# Patient Record
Sex: MALE | Race: WHITE | NOT HISPANIC OR LATINO | Employment: OTHER | URBAN - METROPOLITAN AREA
[De-identification: names, ages, dates, MRNs, and addresses within clinical notes are randomized per-mention and may not be internally consistent; named-entity substitution may affect disease eponyms.]

---

## 2023-04-18 ENCOUNTER — OFFICE VISIT (OUTPATIENT)
Dept: PRIMARY CARE | Facility: CLINIC | Age: 73
End: 2023-04-18
Payer: MEDICARE

## 2023-04-18 VITALS
BODY MASS INDEX: 25.99 KG/M2 | SYSTOLIC BLOOD PRESSURE: 140 MMHG | OXYGEN SATURATION: 98 % | TEMPERATURE: 97.5 F | HEIGHT: 65 IN | WEIGHT: 156 LBS | DIASTOLIC BLOOD PRESSURE: 78 MMHG | HEART RATE: 71 BPM

## 2023-04-18 DIAGNOSIS — E78.00 PURE HYPERCHOLESTEROLEMIA: ICD-10-CM

## 2023-04-18 DIAGNOSIS — I44.7 LEFT BUNDLE BRANCH BLOCK (LBBB): ICD-10-CM

## 2023-04-18 DIAGNOSIS — R39.16 BENIGN PROSTATIC HYPERPLASIA (BPH) WITH STRAINING ON URINATION: ICD-10-CM

## 2023-04-18 DIAGNOSIS — I42.9 CARDIOMYOPATHY, UNSPECIFIED TYPE (MULTI): ICD-10-CM

## 2023-04-18 DIAGNOSIS — I10 BENIGN ESSENTIAL HYPERTENSION: ICD-10-CM

## 2023-04-18 DIAGNOSIS — E53.8 VITAMIN B 12 DEFICIENCY: ICD-10-CM

## 2023-04-18 DIAGNOSIS — N40.1 BENIGN PROSTATIC HYPERPLASIA (BPH) WITH STRAINING ON URINATION: ICD-10-CM

## 2023-04-18 DIAGNOSIS — I25.10 ATHEROSCLEROSIS OF NATIVE CORONARY ARTERY OF NATIVE HEART WITHOUT ANGINA PECTORIS: ICD-10-CM

## 2023-04-18 DIAGNOSIS — I36.1 NONRHEUMATIC TRICUSPID VALVE REGURGITATION: ICD-10-CM

## 2023-04-18 DIAGNOSIS — Z00.00 MEDICARE ANNUAL WELLNESS VISIT, SUBSEQUENT: Primary | ICD-10-CM

## 2023-04-18 DIAGNOSIS — E11.69 TYPE 2 DIABETES MELLITUS WITH OTHER SPECIFIED COMPLICATION, UNSPECIFIED WHETHER LONG TERM INSULIN USE (MULTI): ICD-10-CM

## 2023-04-18 PROBLEM — E78.5 HYPERLIPIDEMIA: Status: ACTIVE | Noted: 2023-04-18

## 2023-04-18 PROBLEM — N40.0 BPH (BENIGN PROSTATIC HYPERPLASIA): Status: ACTIVE | Noted: 2023-04-18

## 2023-04-18 PROBLEM — R97.20 ELEVATED PSA: Status: ACTIVE | Noted: 2023-04-18

## 2023-04-18 PROBLEM — E11.9 DIABETES MELLITUS, TYPE II (MULTI): Status: ACTIVE | Noted: 2023-04-18

## 2023-04-18 PROBLEM — M67.911 DISORDER OF RIGHT ROTATOR CUFF: Status: ACTIVE | Noted: 2023-04-18

## 2023-04-18 PROBLEM — I50.1 LEFT VENTRICULAR FAILURE (MULTI): Status: ACTIVE | Noted: 2023-04-18

## 2023-04-18 LAB — POC HEMOGLOBIN A1C: 6.6 % (ref 4.2–6.5)

## 2023-04-18 PROCEDURE — 1160F RVW MEDS BY RX/DR IN RCRD: CPT | Performed by: FAMILY MEDICINE

## 2023-04-18 PROCEDURE — 3078F DIAST BP <80 MM HG: CPT | Performed by: FAMILY MEDICINE

## 2023-04-18 PROCEDURE — 4010F ACE/ARB THERAPY RXD/TAKEN: CPT | Performed by: FAMILY MEDICINE

## 2023-04-18 PROCEDURE — 3077F SYST BP >= 140 MM HG: CPT | Performed by: FAMILY MEDICINE

## 2023-04-18 PROCEDURE — 83036 HEMOGLOBIN GLYCOSYLATED A1C: CPT | Performed by: FAMILY MEDICINE

## 2023-04-18 PROCEDURE — 1170F FXNL STATUS ASSESSED: CPT | Performed by: FAMILY MEDICINE

## 2023-04-18 PROCEDURE — 99214 OFFICE O/P EST MOD 30 MIN: CPT | Performed by: FAMILY MEDICINE

## 2023-04-18 PROCEDURE — G0439 PPPS, SUBSEQ VISIT: HCPCS | Performed by: FAMILY MEDICINE

## 2023-04-18 PROCEDURE — 1036F TOBACCO NON-USER: CPT | Performed by: FAMILY MEDICINE

## 2023-04-18 PROCEDURE — 1159F MED LIST DOCD IN RCRD: CPT | Performed by: FAMILY MEDICINE

## 2023-04-18 RX ORDER — MAGNESIUM 200 MG
TABLET ORAL
COMMUNITY

## 2023-04-18 RX ORDER — LANOLIN ALCOHOL/MO/W.PET/CERES
1 CREAM (GRAM) TOPICAL DAILY
COMMUNITY
End: 2023-10-18 | Stop reason: ALTCHOICE

## 2023-04-18 RX ORDER — LOSARTAN POTASSIUM AND HYDROCHLOROTHIAZIDE 12.5; 1 MG/1; MG/1
TABLET ORAL
COMMUNITY
Start: 2023-04-15 | End: 2023-04-18 | Stop reason: ALTCHOICE

## 2023-04-18 RX ORDER — PIOGLITAZONEHYDROCHLORIDE 15 MG/1
1 TABLET ORAL DAILY
COMMUNITY
End: 2023-04-18 | Stop reason: ALTCHOICE

## 2023-04-18 RX ORDER — EMPAGLIFLOZIN 25 MG/1
1 TABLET, FILM COATED ORAL DAILY
COMMUNITY
Start: 2021-02-16 | End: 2023-04-18 | Stop reason: SDUPTHER

## 2023-04-18 RX ORDER — EMPAGLIFLOZIN 25 MG/1
25 TABLET, FILM COATED ORAL DAILY
Qty: 90 TABLET | Refills: 3 | Status: SHIPPED | OUTPATIENT
Start: 2023-04-18

## 2023-04-18 RX ORDER — ATORVASTATIN CALCIUM 40 MG/1
40 TABLET, FILM COATED ORAL NIGHTLY
COMMUNITY
Start: 2023-02-01 | End: 2023-04-18 | Stop reason: ALTCHOICE

## 2023-04-18 RX ORDER — FLUTICASONE PROPIONATE AND SALMETEROL 250; 50 UG/1; UG/1
POWDER RESPIRATORY (INHALATION)
COMMUNITY
Start: 2022-02-24 | End: 2023-04-18 | Stop reason: ALTCHOICE

## 2023-04-18 RX ORDER — MINERAL OIL
1 ENEMA (ML) RECTAL DAILY
COMMUNITY

## 2023-04-18 RX ORDER — LOSARTAN POTASSIUM 100 MG/1
1 TABLET ORAL DAILY
COMMUNITY
Start: 2015-11-10 | End: 2024-04-18 | Stop reason: WASHOUT

## 2023-04-18 RX ORDER — CLOPIDOGREL BISULFATE 75 MG/1
75 TABLET ORAL DAILY
COMMUNITY
End: 2023-06-20 | Stop reason: SDUPTHER

## 2023-04-18 RX ORDER — AMLODIPINE BESYLATE 5 MG/1
1 TABLET ORAL DAILY
COMMUNITY
End: 2023-10-18 | Stop reason: ALTCHOICE

## 2023-04-18 RX ORDER — ATORVASTATIN CALCIUM 10 MG/1
10 TABLET, FILM COATED ORAL DAILY
Qty: 30 TABLET | Refills: 5 | Status: SHIPPED | OUTPATIENT
Start: 2023-04-18 | End: 2024-02-19 | Stop reason: WASHOUT

## 2023-04-18 RX ORDER — CARVEDILOL 12.5 MG/1
TABLET ORAL 2 TIMES DAILY
COMMUNITY
End: 2024-04-02

## 2023-04-18 RX ORDER — KETOCONAZOLE 20 MG/ML
SHAMPOO, SUSPENSION TOPICAL
COMMUNITY
Start: 2021-11-16 | End: 2023-04-18 | Stop reason: ALTCHOICE

## 2023-04-18 ASSESSMENT — ACTIVITIES OF DAILY LIVING (ADL)
DOING_HOUSEWORK: INDEPENDENT
GROCERY_SHOPPING: INDEPENDENT
TAKING_MEDICATION: INDEPENDENT
DRESSING: INDEPENDENT
MANAGING_FINANCES: INDEPENDENT
BATHING: INDEPENDENT

## 2023-04-18 ASSESSMENT — PATIENT HEALTH QUESTIONNAIRE - PHQ9
1. LITTLE INTEREST OR PLEASURE IN DOING THINGS: NOT AT ALL
SUM OF ALL RESPONSES TO PHQ9 QUESTIONS 1 AND 2: 0
2. FEELING DOWN, DEPRESSED OR HOPELESS: NOT AT ALL

## 2023-04-18 NOTE — PROGRESS NOTES
"Subjective   Reason for Visit: Tre Sumner is an 72 y.o. male here for a Medicare Wellness visit.     Past Medical, Surgical, and Family History reviewed and updated in chart.         HPI  Reviewed chronic medical conditions  Patient Care Team:  David Ward DO as PCP - General  David Ward DO as PCP - [employer] PCP     Review of Systems  No other complaints  Objective   Vitals:  /78   Pulse 71   Temp 36.4 °C (97.5 °F)   Ht 1.651 m (5' 5\")   Wt 70.8 kg (156 lb)   SpO2 98%   BMI 25.96 kg/m²       Physical Exam  General: Patient is alert and oriented ×3 and appears in no acute distress. No respiratory distress.    Head: Atraumatic normocephalic.    Eyes: EOMI, PERRLA      Ears: Canals patent without any irritation, tympanic membranes without inflammation, no swelling, normal light reflex.  Hearing loss present    Nose: Nares patent. Turbinates are not swollen. No discharge.    Mouth: Normal mucosa. Moist. No erythema, exudates, tonsillar enlargement.    Neck: Normal range of motion, no masses.  Thyroid is palpable and normal in size without any nodules. No anterior cervical or posterior cervical adenopathy.    Heart: Regular rate and rhythm, no murmurs clicks or gallops    Lungs: Clear to auscultation bilaterally without any rhonchi rales or wheezing, lung sounds heard throughout all lung fields    Abdomen: Soft, nontender, no rigidity, rebound, guarding or organomegaly. Bowel sounds ×4 quadrants.    Musculoskeletal: Normal range of motion, strength is grossly intact in the proximal distal muscles of the upper and lower extremities bilaterally, deep tendon reflexes +2 out of 4 and symmetric bilaterally at the patella, Achilles, biceps, triceps, sensation intact.    Nerves: Cranial nerves II through XII appear grossly intact and without deficit    Skin: Intact, dry, no rashes or erythema    Psych: Normal affect.   Assessment/Plan   Problem List Items Addressed This Visit       " Atherosclerotic heart disease of native coronary artery without angina pectoris    Relevant Medications    amLODIPine (Norvasc) 5 mg tablet    clopidogrel (Plavix) 75 mg tablet    carvedilol (Coreg) 12.5 mg tablet    atorvastatin (Lipitor) 10 mg tablet    Other Relevant Orders    CBC and Auto Differential    Comprehensive Metabolic Panel    Vitamin D 1,25 Dihydroxy    Benign essential hypertension    Relevant Medications    atorvastatin (Lipitor) 10 mg tablet    Other Relevant Orders    CBC and Auto Differential    Comprehensive Metabolic Panel    Vitamin D 1,25 Dihydroxy    BPH (benign prostatic hyperplasia)    Relevant Medications    atorvastatin (Lipitor) 10 mg tablet    Other Relevant Orders    CBC and Auto Differential    Comprehensive Metabolic Panel    Vitamin D 1,25 Dihydroxy    PSA    Cardiomyopathy (CMS/HCC)    Relevant Medications    amLODIPine (Norvasc) 5 mg tablet    clopidogrel (Plavix) 75 mg tablet    carvedilol (Coreg) 12.5 mg tablet    Diabetes mellitus, type II (CMS/HCC)    Relevant Medications    Jardiance 25 mg    Other Relevant Orders    POCT glycosylated hemoglobin (Hb A1C) manually resulted (Completed)    Hyperlipidemia    Left bundle branch block (LBBB)    Nonrheumatic tricuspid valve regurgitation    Relevant Medications    amLODIPine (Norvasc) 5 mg tablet    clopidogrel (Plavix) 75 mg tablet    carvedilol (Coreg) 12.5 mg tablet    Vitamin B 12 deficiency     Other Visit Diagnoses       Medicare annual wellness visit, subsequent    -  Primary        Diabetes mellitus: Controlled  - continue Jardiance 25 mg 1/2 tab daily, Hemoglobin A1c 6.6 in office April 18, 2023  - Microalbumin was normal April 18, 2023  1  - Eye exam March 2023  -On a statin and on angiotensin receptor blocker  - Denies any cut scrapes or sores on the feet or any burning numbness tingling in the hands of the feet      Hypertension- Uncontrolled  -Continue medication losartan HCTZ 100/12.5 mg daily     Hyperlipidemia  -  Atorvastatin currently taken and tolerated  -Continue CoQ10 200 mg     Cholesterol 160 mg/dL  LDL 85 mg/dL    HDL 43.0 mg/dL  VLDL 32 mg/dL   Cholesterol/HDL Ratio 3.7  Triglycerides 159 High  mg/dL      We have suggested MIND diet     Follow up in 6 months or as needed

## 2023-06-20 DIAGNOSIS — I25.10 ATHEROSCLEROSIS OF NATIVE CORONARY ARTERY OF NATIVE HEART WITHOUT ANGINA PECTORIS: Primary | ICD-10-CM

## 2023-06-20 RX ORDER — CLOPIDOGREL BISULFATE 75 MG/1
75 TABLET ORAL DAILY
Qty: 90 TABLET | Refills: 3 | Status: SHIPPED | OUTPATIENT
Start: 2023-06-20 | End: 2024-06-14

## 2023-10-18 ENCOUNTER — OFFICE VISIT (OUTPATIENT)
Dept: PRIMARY CARE | Facility: CLINIC | Age: 73
End: 2023-10-18
Payer: MEDICARE

## 2023-10-18 VITALS
HEART RATE: 64 BPM | OXYGEN SATURATION: 98 % | DIASTOLIC BLOOD PRESSURE: 72 MMHG | HEIGHT: 65 IN | BODY MASS INDEX: 26.16 KG/M2 | SYSTOLIC BLOOD PRESSURE: 130 MMHG | WEIGHT: 157 LBS | TEMPERATURE: 97.5 F

## 2023-10-18 DIAGNOSIS — N40.1 BENIGN PROSTATIC HYPERPLASIA (BPH) WITH STRAINING ON URINATION: ICD-10-CM

## 2023-10-18 DIAGNOSIS — I10 BENIGN ESSENTIAL HYPERTENSION: ICD-10-CM

## 2023-10-18 DIAGNOSIS — E78.00 PURE HYPERCHOLESTEROLEMIA: ICD-10-CM

## 2023-10-18 DIAGNOSIS — Z00.00 ANNUAL PHYSICAL EXAM: Primary | ICD-10-CM

## 2023-10-18 DIAGNOSIS — R39.16 BENIGN PROSTATIC HYPERPLASIA (BPH) WITH STRAINING ON URINATION: ICD-10-CM

## 2023-10-18 DIAGNOSIS — I25.10 ATHEROSCLEROSIS OF NATIVE CORONARY ARTERY OF NATIVE HEART WITHOUT ANGINA PECTORIS: ICD-10-CM

## 2023-10-18 DIAGNOSIS — E11.69 TYPE 2 DIABETES MELLITUS WITH OTHER SPECIFIED COMPLICATION, UNSPECIFIED WHETHER LONG TERM INSULIN USE (MULTI): ICD-10-CM

## 2023-10-18 DIAGNOSIS — E53.8 VITAMIN B 12 DEFICIENCY: ICD-10-CM

## 2023-10-18 DIAGNOSIS — I42.9 CARDIOMYOPATHY, UNSPECIFIED TYPE (MULTI): ICD-10-CM

## 2023-10-18 DIAGNOSIS — I44.7 LEFT BUNDLE BRANCH BLOCK (LBBB): ICD-10-CM

## 2023-10-18 PROBLEM — M25.542 PAIN IN THUMB JOINT WITH MOVEMENT OF LEFT HAND: Status: ACTIVE | Noted: 2023-10-18

## 2023-10-18 PROBLEM — I35.1 NONRHEUMATIC AORTIC VALVE INSUFFICIENCY: Status: ACTIVE | Noted: 2023-10-18

## 2023-10-18 PROBLEM — R06.2 WHEEZING: Status: ACTIVE | Noted: 2023-10-18

## 2023-10-18 PROBLEM — M25.511 RIGHT SHOULDER PAIN: Status: ACTIVE | Noted: 2023-10-18
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
## 2023-10-18 PROBLEM — M19.011 OSTEOARTHRITIS OF RIGHT GLENOHUMERAL JOINT: Status: ACTIVE | Noted: 2023-10-18

## 2023-10-18 PROBLEM — L21.0 SEBORRHEA CAPITIS: Status: ACTIVE | Noted: 2023-10-18

## 2023-10-18 PROBLEM — M77.8 TENDINITIS OF RIGHT SHOULDER: Status: ACTIVE | Noted: 2023-10-18

## 2023-10-18 PROBLEM — J40 BRONCHITIS: Status: ACTIVE | Noted: 2023-10-18

## 2023-10-18 PROBLEM — L72.3 INFECTED SEBACEOUS CYST: Status: ACTIVE | Noted: 2023-10-18

## 2023-10-18 PROBLEM — R05.9 COUGH: Status: ACTIVE | Noted: 2023-10-18

## 2023-10-18 PROBLEM — M54.31 SCIATICA OF RIGHT SIDE: Status: ACTIVE | Noted: 2023-10-18

## 2023-10-18 PROBLEM — M19.032 LOCALIZED PRIMARY OSTEOARTHRITIS OF CARPOMETACARPAL (CMC) JOINT OF LEFT WRIST: Status: ACTIVE | Noted: 2023-10-18

## 2023-10-18 PROBLEM — L03.119 CELLULITIS OF LOWER LEG: Status: ACTIVE | Noted: 2023-10-18

## 2023-10-18 PROBLEM — L08.9 INFECTED SEBACEOUS CYST: Status: ACTIVE | Noted: 2023-10-18

## 2023-10-18 PROBLEM — M75.41 IMPINGEMENT SYNDROME OF RIGHT SHOULDER: Status: ACTIVE | Noted: 2023-10-18

## 2023-10-18 PROBLEM — R07.81 RIB PAIN ON RIGHT SIDE: Status: ACTIVE | Noted: 2023-10-18

## 2023-10-18 PROBLEM — M75.51 BURSITIS OF RIGHT SHOULDER: Status: ACTIVE | Noted: 2023-10-18

## 2023-10-18 PROBLEM — J06.9 URI (UPPER RESPIRATORY INFECTION): Status: ACTIVE | Noted: 2023-10-18

## 2023-10-18 PROBLEM — M12.811 ROTATOR CUFF ARTHROPATHY OF RIGHT SHOULDER: Status: ACTIVE | Noted: 2023-10-18

## 2023-10-18 LAB — POC HEMOGLOBIN A1C: 6.4 % (ref 4.2–6.5)

## 2023-10-18 PROCEDURE — 1159F MED LIST DOCD IN RCRD: CPT | Performed by: FAMILY MEDICINE

## 2023-10-18 PROCEDURE — 1036F TOBACCO NON-USER: CPT | Performed by: FAMILY MEDICINE

## 2023-10-18 PROCEDURE — 99397 PER PM REEVAL EST PAT 65+ YR: CPT | Performed by: FAMILY MEDICINE

## 2023-10-18 PROCEDURE — 83036 HEMOGLOBIN GLYCOSYLATED A1C: CPT | Performed by: FAMILY MEDICINE

## 2023-10-18 PROCEDURE — 1160F RVW MEDS BY RX/DR IN RCRD: CPT | Performed by: FAMILY MEDICINE

## 2023-10-18 PROCEDURE — 99213 OFFICE O/P EST LOW 20 MIN: CPT | Performed by: FAMILY MEDICINE

## 2023-10-18 PROCEDURE — 3075F SYST BP GE 130 - 139MM HG: CPT | Performed by: FAMILY MEDICINE

## 2023-10-18 PROCEDURE — 4010F ACE/ARB THERAPY RXD/TAKEN: CPT | Performed by: FAMILY MEDICINE

## 2023-10-18 PROCEDURE — 3078F DIAST BP <80 MM HG: CPT | Performed by: FAMILY MEDICINE

## 2023-10-18 ASSESSMENT — PATIENT HEALTH QUESTIONNAIRE - PHQ9
1. LITTLE INTEREST OR PLEASURE IN DOING THINGS: NOT AT ALL
2. FEELING DOWN, DEPRESSED OR HOPELESS: NOT AT ALL
SUM OF ALL RESPONSES TO PHQ9 QUESTIONS 1 AND 2: 0

## 2023-10-18 NOTE — PROGRESS NOTES
"Subjective   Reason for Visit: Tre Sumner is an 72 y.o. male here for a annual physical     Past Medical, Surgical, and Family History reviewed and updated in chart.HBA1C    Reviewed all medications by prescribing practitioner or clinical pharmacist (such as prescriptions, OTCs, herbal therapies and supplements) and documented in the medical record.    HPI  Reviewed chronic medical conditions    Concerns today: none    In general the patient states that his health is:  good    Regular dental visits: Has dentures  Vision problems: has dry eyes and sees Dr Urena.  Eye exam 9/2023  Hearing loss:  Has hearing aides.      Diet: Eating less  Exercise:  He is physically active and goes to the gym  Weight concerns: no  Sleep: Sleep is good with benadryl  Caffeine: 2 cups coffee  Water: good intake    Tobacco use:no   Alcohol use:no  Illicit drug use:no    Colon cancer screening:  Last done  Colonoscopy   fecal occult blood test   Cologuard    Do you feel safe at home?:  Patient Care Team:  David Ward DO as PCP - General  David Ward DO as PCP - [employer] PCP     Review of Systems  No other complaints  Objective   Vitals:  /72   Pulse 64   Temp 36.4 °C (97.5 °F)   Ht 1.651 m (5' 5\")   Wt 71.2 kg (157 lb)   SpO2 98%   BMI 26.13 kg/m²       Physical Exam  General: Patient is alert and oriented ×3 and appears in no acute distress. No respiratory distress.    Head: Atraumatic normocephalic.    Eyes: EOMI, PERRLA      Ears: Canals patent without any irritation, tympanic membranes without inflammation, no swelling, normal light reflex.  Hearing loss present    Nose: Nares patent. Turbinates are not swollen. No discharge.    Mouth: Normal mucosa. Moist. No erythema, exudates, tonsillar enlargement.    Neck: Normal range of motion, no masses.  Thyroid is palpable and normal in size without any nodules. No anterior cervical or posterior cervical adenopathy.    Heart: Regular rate and rhythm, " no murmurs clicks or gallops    Lungs: Clear to auscultation bilaterally without any rhonchi rales or wheezing, lung sounds heard throughout all lung fields    Abdomen: Soft, nontender, no rigidity, rebound, guarding or organomegaly. Bowel sounds ×4 quadrants.    Musculoskeletal: Normal range of motion, strength is grossly intact in the proximal distal muscles of the upper and lower extremities bilaterally, deep tendon reflexes +2 out of 4 and symmetric bilaterally at the patella, Achilles, biceps, triceps, sensation intact.    Nerves: Cranial nerves II through XII appear grossly intact and without deficit    Skin: Intact, dry, no rashes or erythema    Psych: Normal affect.   Assessment/Plan   Problem List Items Addressed This Visit       Atherosclerotic heart disease of native coronary artery without angina pectoris    Benign essential hypertension    BPH (benign prostatic hyperplasia)    Cardiomyopathy (CMS/HCC)    Diabetes mellitus, type II (CMS/HCC)    Relevant Orders    Hemoglobin A1C    Vitamin B12    Hyperlipidemia    Left bundle branch block (LBBB)    Vitamin B 12 deficiency     Other Visit Diagnoses       Annual physical exam    -  Primary        Diabetes mellitus: Controlled  - continue Jardiance 25 mg 1/2 tab daily, Hemoglobin A1c 6.4 10/18/2023  - Microalbumin was normal April 18, 2023           -   Eye exam March 2023  -On a statin and on angiotensin receptor blocker  - Denies any cut scrapes or sores on the feet or any burning numbness tingling in the hands of the feet    Hypertension- Uncontrolled  -Continue medication losartan HCTZ 100/12.5 mg daily     Hyperlipidemia  - Atorvastatin 40 mg dailycurrently taken and tolerated  -Continue CoQ10 200 mg     Cholesterol 160 mg/dL  LDL 85 mg/dL    HDL 43.0 mg/dL  VLDL 32 mg/dL   Cholesterol/HDL Ratio 3.7  Triglycerides 159 High  mg/dL      We have suggested MIND diet     Follow up in 6 months or as needed

## 2023-12-03 PROBLEM — I50.20 HFREF (HEART FAILURE WITH REDUCED EJECTION FRACTION) (MULTI): Status: ACTIVE | Noted: 2023-12-03

## 2023-12-03 NOTE — PROGRESS NOTES
[facility]    Patient Name: Tre Sumner  Patient : 1950      Scribe Attestation  By signing my name below, I, Danilo Schaffer   attest that this documentation has been prepared under the direction and in the presence of Albaro Ovalles DO.       Reason for visit:  This is a 72-year-old male here for follow-up regarding his history of coronary artery disease status post PCI of his RCA done in , HFrEF with an ejection fraction of 40%, mild aortic valve regurgitation, hypertension, and dyslipidemia.      HPI:  This is a 72-year-old male here for follow-up regarding his history of coronary artery disease status post PCI of his RCA done in , HFrEF with an ejection fraction of 40%, mild aortic valve regurgitation, hypertension, and dyslipidemia.  The patient was last evaluated by me in 2023 at which time I ordered blood work including CMP/lipid/magnesium/CBC be drawn in 6 months and asked the patient to follow-up in 6 months and sooner if necessary.  CMP done in 2023 showed normal serum sodium and potassium, with a creatinine of 0.96. Normal ALT and AST. Serum magnesium 2.15. Hemoglobin A1c done in 2023 was 7.0%. CBC showed a hemoglobin of 15.8. Lipid panel done in 2023 showed an LDL of 67 and triglycerides of 110 on atorvastatin 40 mg daily. ECG done today showed sinus rhythm with a heart rate of 67 bpm. The patient reports intermittent mild chest heaviness which sometimes occur with exertion, like during exercise. He states that chest discomfort resolves after a few minutes without intervention. He denies any new shortness of breath palpitations and lightheadedness. He states that he takes all of his medications as prescribed. During my exam, he was resting comfortably on the exam table.            Assessment/Plan:   1. Coronary artery disease/chest heaviness  The patient has a history of coronary artery disease status  post PCI of his RCA done in 2010.  ECG done today showed sinus rhythm with a heart rate of 67 bpm.   He  reports intermittent chest heaviness as described in the HPI.  Blood pressure appears controlled on exam today.  He should continue his current antihypertensive medications and antiplatelet therapy.  Echocardiogram done in December 2022 showed mildly reduced left ventricular systolic function with an ejection fraction of 40 to 45%, normal right ventricular systolic function, mild aortic valve regurgitation, mild mitral valve regurgitation.  Lipid panel done in December 2023 showed an LDL of 67 and triglycerides of 110 on atorvastatin 40 mg daily.   Suggested further ischemic work up including stress test, as well as starting isosorbide mononitrate, which he declined at this time.   Recent lab works as noted in the HPI.   Lab works as ordered above will be done in 6 months prior to the next visit.  Please see lifestyle recommendations below.  Follow up in 6 months and sooner if necessary.      2. HFrEF  The patient has a history of HFrEF with an ejection fraction of 40%.  Echocardiogram done in December 2022 showed mildly reduced left ventricular systolic function with an ejection fraction of 40 to 45%, normal right ventricular systolic function, mild aortic valve regurgitation, mild mitral valve regurgitation.  He does not appear significantly volume overloaded on exam today.  He should continue current cardiac medications.  Recent lab works as noted in the HPI.   Lab works as noted below will be done in 6 months prior to his next visit.   I discussed with him the importance of following a low-sodium heart healthy diet.  Follow up in 6 months and sooner if necessary.      3. Aortic valve regurgitation  The patient has a history of aortic valve regurgitation.  Echocardiogram done in December 2022 showed mildly reduced left ventricular systolic function with an ejection fraction of 40 to 45%, normal right  ventricular systolic function, mild aortic valve regurgitation, mild mitral valve regurgitation.  I will continue to monitor clinically for now.     4. Hypertension  Patient has a history of hypertension which appears controlled on exam today.  He should continue his current antihypertensive medications.      5. Dyslipidemia  Lipid panel done in December 2023 showed an LDL of 67 and triglycerides of 110 on atorvastatin 40 mg daily.   Please see lifestyle recommendations below.       Orders:   Suggested stress test -patient declined.  Suggested Imdur -patient declined.  BMP/BNP/magnesium in 6 months,   Follow-up in 6 months.      Lifestyle Recommendations  I recommend a whole-food plant-based diet, an eating pattern that encourages the consumption of unrefined plant foods (such as fruits, vegetables, tubers, whole grains, legumes, nuts and seeds) and discourages meats, dairy products, eggs and processed foods.     The AHA/ACC recommends that the patient consume a dietary pattern that emphasizes intake of vegetables, fruits, and whole grains; includes low-fat dairy products, poultry, fish, legumes, non-tropical vegetable oils, and nuts; and limits intake of sodium, sweets, sugar-sweetened beverages, and red meats.  Adapt this dietary pattern to appropriate calorie requirements (a 500-750 kcal/day deficit to loose weight), personal and cultural food preferences, and nutrition therapy for other medical conditions (including diabetes).  Achieve this pattern by following plans such as the [place] Mediterranean, DASH dietary pattern, or AHA diet.     Engage in 2 hours and 30 minutes per week of moderate-intensity physical activity, or 1 hour and 15 minutes (75 minutes) per week of vigorous-intensity aerobic physical activity, or an equivalent combination of moderate and vigorous-intensity aerobic physical activity. Aerobic activity should be performed in episodes of at least 10 minutes preferably spread throughout the  week.     Adhering to a heart healthy diet, regular exercise habits, avoidance of tobacco products, and maintenance of a healthy weight are crucial components of their heart disease risk reduction.     Any positive review of systems not specifically addressed in the office visit today should be evaluated and treated by the patients primary care physician or in an emergency department if necessary     Patient was notified that results from ordered tests will be called to the patient if it changes current management; it will otherwise be discussed at a future appointment and available on [facility].     Thank you for allowing me to participate in the care of this patient.        This document was generated using the assistance of voice recognition software. If there are any errors of spelling, grammar, syntax, or meaning; please feel free to contact me directly for clarification.    Past Medical History:  He has a past medical history of Abnormal electrocardiogram (ECG) (EKG), Encounter for screening for malignant neoplasm of prostate (02/16/2021), Other conditions influencing health status (01/05/2022), and Personal history of other diseases of the musculoskeletal system and connective tissue.    Past Surgical History:  He has a past surgical history that includes Other surgical history (12/01/2016); Shoulder surgery (12/01/2016); Other surgical history (12/01/2016); and Hemorrhoid surgery (12/01/2016).      Social History:  He reports that he quit smoking about 49 years ago. His smoking use included cigarettes. He has never used smokeless tobacco. He reports that he does not drink alcohol and does not use drugs.    Family History:  No family history on file.     Allergies:  Aspirin, Penicillins, and Statins-hmg-coa reductase inhibitors    Outpatient Medications:  Current Outpatient Medications   Medication Instructions    atorvastatin (LIPITOR) 10 mg, oral, Daily    carvedilol (Coreg) 12.5 mg tablet oral, 2 times  "daily    clopidogrel (PLAVIX) 75 mg, oral, Daily    cyanocobalamin, vitamin B-12, 1,000 mcg tablet, sublingual DISSOLVE 1 TABLET UNDER THE TONGUE ONCE A DAY    fexofenadine (Allegra) 180 mg tablet 1 tablet, oral, Daily    Jardiance 25 mg, oral, Daily    losartan (Cozaar) 100 mg tablet 1 tablet, oral, Daily    pedi multivit no.17 w-fluoride (Poly-Vi-Liane) 0.5 mg tablet,chewable 1 tablet, oral, Daily        ROS:  A 14 point review of systems was done and is negative other than as stated in HPI    Vitals:      11/21/2022     8:42 AM 12/1/2022    10:23 AM 12/28/2022     9:02 AM 2/22/2023    10:51 AM 4/18/2023     8:34 AM 6/26/2023     1:25 PM 10/18/2023    10:17 AM   Vitals   Systolic 142 140 150 152 140 162 130   Diastolic 79 80 82 100 78 88 72   Heart Rate   60 66 71 63 64   Temp     36.4 °C (97.5 °F)  36.4 °C (97.5 °F)   Resp   16       Height (in)   1.651 m (5' 5\") 1.651 m (5' 5\") 1.651 m (5' 5\") 1.651 m (5' 5\") 1.651 m (5' 5\")   Weight (lb)   156 159 156 157.13 157   BMI   25.96 kg/m2 26.46 kg/m2 25.96 kg/m2 26.15 kg/m2 26.13 kg/m2   BSA (m2)   1.8 m2 1.82 m2 1.8 m2 1.81 m2 1.81 m2        Physical Exam:     Constitutional: Cooperative, in no acute distress, alert, appears stated age.   Skin: Skin color, texture, turgor normal. No rashes or lesions.   Head: Normocephalic. No masses, lesions, tenderness or abnormalities   Eyes: Extraocular movements are grossly intact.   Mouth and throat: Mucous membranes moist   Neck: Neck supple, no carotid bruits, no JVD   Respiratory: Lungs clear to auscultation, no wheezing or rhonchi, no use of accessory muscles   Chest wall: No scars, normal excursion with respiration   Cardiovascular: Regular rhythm, + murmur  Gastrointestinal: Abdomen soft, nontender. Bowel sounds normal.   Musculoskeletal: Strength equal in upper extremities   Extremities: No pitting edema, erythema on the left leg  Neurologic: Sensation grossly intact, alert and oriented x3       Intake/Output:   No " intake/output data recorded.    Outpatient Medications  Current Outpatient Medications on File Prior to Visit   Medication Sig Dispense Refill    atorvastatin (Lipitor) 10 mg tablet Take 1 tablet (10 mg) by mouth once daily. 30 tablet 5    carvedilol (Coreg) 12.5 mg tablet Take by mouth twice a day.      clopidogrel (Plavix) 75 mg tablet Take 1 tablet (75 mg) by mouth once daily. 90 tablet 3    cyanocobalamin, vitamin B-12, 1,000 mcg tablet, sublingual DISSOLVE 1 TABLET UNDER THE TONGUE ONCE A DAY      fexofenadine (Allegra) 180 mg tablet Take 1 tablet (180 mg) by mouth once daily.      Jardiance 25 mg Take 1 tablet (25 mg) by mouth once daily. 90 tablet 3    losartan (Cozaar) 100 mg tablet Take 1 tablet (100 mg) by mouth once daily.      pedi multivit no.17 w-fluoride (Poly-Vi-Liane) 0.5 mg tablet,chewable Chew 1 tablet once daily.       No current facility-administered medications on file prior to visit.       Labs: (past 26 weeks)  Recent Results (from the past 4368 hour(s))   POCT glycosylated hemoglobin (Hb A1C) manually resulted    Collection Time: 10/18/23 11:00 AM   Result Value Ref Range    POC HEMOGLOBIN A1c 6.4 4.2 - 6.5 %       ECG  No results found for this or any previous visit (from the past 4464 hour(s)).    Echocardiogram  No results found for this or any previous visit from the past 1095 days.      CV Studies:  EKG:No results found for this or any previous visit (from the past 4464 hour(s)).  Echocardiogram:   Echocardiogram     [address] [address]  Phone [phone] Fax [phone]    TRANSTHORACIC ECHOCARDIOGRAM REPORT      Patient Name:     MAIKEL Ignacio Physician:  [id number] Davy CONTE MD  Study Date:       12/5/2022       Referring           [id number] TILA FAGAN  Physician:  MRN/PID:          [id number]        PCP:  Accession/Order#: [id number]    [facility] Echo  Lab  Location:  YOB: 1950       Fellow:  Gender:           M               Nurse:  Admit Date:       12/5/2022       Sonographer:        Shruti [facility]  Admission Status: Outpatient      Additional Staff:  Height:           165.10 cm       CC Report to:  Weight:           71.22 kg        Study Type:         Echocardiogram  BSA:              1.78 m2  Blood Pressure: 176 /102 mmHg    Diagnosis/ICD: I25.10-Atherosclerotic heart disease of native coronary artery  without angina pectoris; I10-Essential (primary) hypertension;  I35.1-Nonrheumatic aortic (valve) insufficiency  Indication:    CAD, HTN, AI  Procedure/CPT: Echo Complete w Full Doppler-[id number]  Study Detail: The following Echo studies were performed: 2D, M-Mode, Doppler and  color flow.      PHYSICIAN INTERPRETATION:  Left Ventricle: Left ventricular systolic function is mildly decreased, with an estimated ejection fraction of 40-45%. There is global hypokinesis of the left ventricle with minor regional variations. The left ventricular cavity size is normal. The left ventricular septal wall thickness is mildly increased. There is mild to moderate concentric left ventricular hypertrophy. Spectral Doppler shows an impaired relaxation pattern of left ventricular diastolic filling.  Left Atrium: The left atrium is mildly dilated.  Right Ventricle: The right ventricle is normal in size. There is normal right ventricular global systolic function.  Right Atrium: The right atrium is normal in size.  Aortic Valve: The aortic valve is trileaflet. There is mild aortic valve regurgitation. The mean gradient of the aortic valve is 6.0 mmHg.  Mitral Valve: The mitral valve is normal in structure. There is mild mitral valve regurgitation.  Tricuspid Valve: The tricuspid valve is structurally normal. There is trace tricuspid regurgitation.  Pulmonic Valve: The pulmonic valve is structurally normal. There is trace pulmonic valve regurgitation.  Pericardium:  There is no pericardial effusion noted.  Aorta: The aortic root is normal.  Systemic Veins: The inferior vena cava appears to be of normal size. There is IVC inspiratory collapse greater than 50%.      CONCLUSIONS:  1. Left ventricular systolic function is mildly decreased with a 40-45% estimated ejection fraction.  2. Spectral Doppler shows an impaired relaxation pattern of left ventricular diastolic filling.  3. Mild aortic valve regurgitation.  4. There is global hypokinesis of the left ventricle with minor regional variations.    QUANTITATIVE DATA SUMMARY:  2D MEASUREMENTS:  Normal Ranges:  IVSd:          1.50 cm    (0.6-1.1cm)  LVPWd:         1.30 cm    (0.6-1.1cm)  LVIDd:         3.60 cm    (3.9-5.9cm)  LVIDs:         2.60 cm  LV Mass Index: 100.8 g/m2  LV % FS        27.8 %    LA VOLUME:  Normal Ranges:  LA Vol A4C:        42.6 ml    (22+/-6mL/m2)  LA Vol A2C:        53.0 ml  LA Vol BP:         52.8 ml  LA Vol Index A4C:  23.8ml/m2  LA Vol Index A2C:  29.7 ml/m2  LA Vol Index BP:   29.6 ml/m2  LA Area A4C:       14.5 cm2  LA Area A2C:       18.0 cm2  LA Major Axis A4C: 4.2 cm  LA Major Axis A2C: 5.2 cm  LA Volume Index:   27.0 ml/m2  LA Vol A4C:        38.0 ml  LA Vol A2C:        49.0 ml    RA VOLUME BY A/L METHOD:  Normal Ranges:  RA Area A4C: 15.1 cm2    M-MODE MEASUREMENTS:  Normal Ranges:  Ao Root: 3.40 cm (2.0-3.7cm)  AoV Exc: 2.30 cm (1.5-2.5cm)  LAs:     3.20 cm (2.7-4.0cm)    AORTA MEASUREMENTS:  Normal Ranges:  AoV Exc:     2.30 cm (1.5-2.5cm)  Ao Sinus, d: 3.00 cm (2.1-3.5cm)  Ao STJ, d:   2.10 cm (1.7-3.4cm)  Asc Ao, d:   3.20 cm (2.1-3.4cm)  Ao Arch:     3.00 cm (2.0-3.6cm)    LV SYSTOLIC FUNCTION BY 2D PLANIMETRY (MOD):  Normal Ranges:  EF-A4C View: 45.6 % (>=55%)  EF-A2C View: 44.9 %  EF-Biplane:  44.4 %    LV DIASTOLIC FUNCTION:  Normal Ranges:  MV Peak E:    0.49 m/s    (0.7-1.2 m/s)  MV Peak A:    0.91 m/s    (0.42-0.7 m/s)  E/A Ratio:    0.54        (1.0-2.2)  MV e'         0.04 m/s     (>8.0)  MV lateral e' 0.05 m/s  MV medial e'  0.04 m/s  MV A Dur:     176.00 msec  E/e' Ratio:   11.36       (<8.0)    MITRAL VALVE:  Normal Ranges:  MV DT: 299 msec (150-240msec)    MITRAL INSUFFICIENCY:  Normal Ranges:  MR VTI:  128.00 cm  MR Vmax: 486.00 cm/s    AORTIC VALVE:  Normal Ranges:  AoV Mean P.0 mmHg (1.7-11.5mmHg)  LVOT Max Elder:            0.99 m/s (<=1.1m/s)  AoV VTI:                 33.50 cm (18-25cm)  LVOT VTI:                19.00 cm  LVOT Diameter:           2.10 cm  (1.8-2.4cm)  AoV Area, VTI:           1.96 cm2 (2.5-5.5cm2)  AoV Dimensionless Index: 0.57    AORTIC INSUFFICIENCY:  AI Vmax:      3.70 m/s  AI Half-time: 662 msec    RIGHT VENTRICLE:  RV 1   3.5 cm  RV 2   2.8 cm  RV 3   6.0 cm  TAPSE: 23.8 mm  RV s'  0.11 m/s    TRICUSPID VALVE/RVSP:  Normal Ranges:  Peak TR Velocity: 2.47 m/s  RV Syst Pressure: 27.4 mmHg (< 30mmHg)      [id number] Davy Will MD  Electronically signed on 2022 at 5:17:40 PM         Final     Stress Testing IMGRESULT(VTS0130:1:1825):   NM CARDIAC STRESS REST (MYOCARDIAL PERFUSION MIBI) 2023    Narrative  MRN: [id number]  Patient Name: MAIKEL CONTE    STUDY:  CARDIAC STRESS/REST INJECTION; PART 2 STRESS OR REST (NO CHARGE);  CARDIAC STRESS/REST (MYOCARDIAL PERFUSION/MIBI);  2023 10:29 am    INDICATION:  -  I25.10: Atherosclerotic heart disease of native coronary artery  without angina pectoris.    COMPARISON:  None.    ACCESSION NUMBER(S):  [id number]; [id number]; [id number]    ORDERING CLINICIAN:  TILA FAGAN    TECHNIQUE:  DIVISION OF NUCLEAR MEDICINE  PHARMACOLOGIC STRESS MYOCARDIAL PERFUSION SCAN, ONE DAY PROTOCOL    The patient received an intravenous dose of  11.1 mCi of Tc-99m  tetrofosmin and resting emission tomographic (SPECT) images of the  myocardium were acquired. The patient then received an intravenous  infusion of 0.4mg regadenoson (Lexiscan) followed by an additional  dose of  33.6 mCi of Tc-99m tetrofosmin. Stress phase  SPECT images of  the myocardium were then acquired. These included ECG-gated images to  assess and quantify ventricular function.    FINDINGS:  There is a small fixed perfusion defect inferior wall which resolves  on prone imaging suggesting diaphragmatic attenuation. No clear  reversible perfusion defects seen.    Calculated ejection fraction of 43% with global hypokinesis.    Impression  1. There is a small fixed perfusion defect inferior wall which  resolves on prone imaging suggesting diaphragmatic attenuation. No  clear reversible perfusion defects seen. There is a low probability  of ischemia.    2. Calculated ejection fraction of 43% with global hypokinesis.    Cardiac Catheterization: No results found for this or any previous visit from the past 1825 days.  No results found for this or any previous visit from the past 3650 days.     Cardiac Scoring: No results found for this or any previous visit from the past 1825 days.    AAA : No results found for this or any previous visit from the past 1825 days.    OTHER: No results found for this or any previous visit from the past 1825 days.    LAST IMAGING RESULTS  ELECTROCARDIOGRAM RHYTHM STRIP  Ordered by an unspecified provider.    Problem List Items Addressed This Visit       Atherosclerotic heart disease of native coronary artery without angina pectoris - Primary    Relevant Orders    ECG 12 Lead (Completed)    Basic Metabolic Panel    B-Type Natriuretic Peptide    Magnesium    Follow Up In Cardiology    Benign essential hypertension    Relevant Orders    ECG 12 Lead (Completed)    Basic Metabolic Panel    B-Type Natriuretic Peptide    Magnesium    Follow Up In Cardiology    Hyperlipidemia    Relevant Orders    ECG 12 Lead (Completed)    Basic Metabolic Panel    B-Type Natriuretic Peptide    Magnesium    Follow Up In Cardiology    Aortic valve regurgitation    Relevant Orders    ECG 12 Lead (Completed)    Basic Metabolic Panel    B-Type Natriuretic Peptide     Magnesium    Follow Up In Cardiology    HFrEF (heart failure with reduced ejection fraction) (CMS/[facility])    Relevant Orders    ECG 12 Lead (Completed)    Basic Metabolic Panel    B-Type Natriuretic Peptide    Magnesium    Follow Up In Cardiology    Chest heaviness              Albaro Ovalles DO, FACC, FACOI

## 2023-12-05 ENCOUNTER — LAB (OUTPATIENT)
Dept: LAB | Facility: LAB | Age: 73
End: 2023-12-05
Payer: MEDICARE

## 2023-12-05 DIAGNOSIS — E78.5 HYPERLIPIDEMIA, UNSPECIFIED: ICD-10-CM

## 2023-12-05 DIAGNOSIS — R39.16 BENIGN PROSTATIC HYPERPLASIA (BPH) WITH STRAINING ON URINATION: ICD-10-CM

## 2023-12-05 DIAGNOSIS — I10 ESSENTIAL (PRIMARY) HYPERTENSION: Primary | ICD-10-CM

## 2023-12-05 DIAGNOSIS — E11.69 TYPE 2 DIABETES MELLITUS WITH OTHER SPECIFIED COMPLICATION, UNSPECIFIED WHETHER LONG TERM INSULIN USE (MULTI): ICD-10-CM

## 2023-12-05 DIAGNOSIS — I42.9 CARDIOMYOPATHY, UNSPECIFIED (MULTI): ICD-10-CM

## 2023-12-05 DIAGNOSIS — I25.10 ATHEROSCLEROTIC HEART DISEASE OF NATIVE CORONARY ARTERY WITHOUT ANGINA PECTORIS: ICD-10-CM

## 2023-12-05 DIAGNOSIS — I10 BENIGN ESSENTIAL HYPERTENSION: ICD-10-CM

## 2023-12-05 DIAGNOSIS — N40.1 BENIGN PROSTATIC HYPERPLASIA (BPH) WITH STRAINING ON URINATION: ICD-10-CM

## 2023-12-05 DIAGNOSIS — I25.10 ATHEROSCLEROSIS OF NATIVE CORONARY ARTERY OF NATIVE HEART WITHOUT ANGINA PECTORIS: ICD-10-CM

## 2023-12-05 LAB
ALBUMIN SERPL BCP-MCNC: 4.2 G/DL (ref 3.4–5)
ALP SERPL-CCNC: 112 U/L (ref 33–136)
ALT SERPL W P-5'-P-CCNC: 26 U/L (ref 10–52)
ANION GAP SERPL CALC-SCNC: 14 MMOL/L (ref 10–20)
AST SERPL W P-5'-P-CCNC: 27 U/L (ref 9–39)
BASOPHILS # BLD AUTO: 0.05 X10*3/UL (ref 0–0.1)
BASOPHILS NFR BLD AUTO: 1 %
BILIRUB SERPL-MCNC: 1.8 MG/DL (ref 0–1.2)
BUN SERPL-MCNC: 14 MG/DL (ref 6–23)
CALCIUM SERPL-MCNC: 9.2 MG/DL (ref 8.6–10.3)
CHLORIDE SERPL-SCNC: 104 MMOL/L (ref 98–107)
CHOLEST SERPL-MCNC: 132 MG/DL (ref 0–199)
CHOLESTEROL/HDL RATIO: 3
CO2 SERPL-SCNC: 24 MMOL/L (ref 21–32)
CREAT SERPL-MCNC: 0.96 MG/DL (ref 0.5–1.3)
EOSINOPHIL # BLD AUTO: 0.22 X10*3/UL (ref 0–0.4)
EOSINOPHIL NFR BLD AUTO: 4.2 %
ERYTHROCYTE [DISTWIDTH] IN BLOOD BY AUTOMATED COUNT: 13.9 % (ref 11.5–14.5)
GFR SERPL CREATININE-BSD FRML MDRD: 82 ML/MIN/1.73M*2
GLUCOSE SERPL-MCNC: 98 MG/DL (ref 74–99)
HCT VFR BLD AUTO: 49.8 % (ref 41–52)
HDLC SERPL-MCNC: 43.4 MG/DL
HGB BLD-MCNC: 15.8 G/DL (ref 13.5–17.5)
IMM GRANULOCYTES # BLD AUTO: 0.01 X10*3/UL (ref 0–0.5)
IMM GRANULOCYTES NFR BLD AUTO: 0.2 % (ref 0–0.9)
LDLC SERPL CALC-MCNC: 67 MG/DL
LYMPHOCYTES # BLD AUTO: 1.09 X10*3/UL (ref 0.8–3)
LYMPHOCYTES NFR BLD AUTO: 20.9 %
MAGNESIUM SERPL-MCNC: 2.15 MG/DL (ref 1.6–2.4)
MCH RBC QN AUTO: 29.3 PG (ref 26–34)
MCHC RBC AUTO-ENTMCNC: 31.7 G/DL (ref 32–36)
MCV RBC AUTO: 92 FL (ref 80–100)
MONOCYTES # BLD AUTO: 0.53 X10*3/UL (ref 0.05–0.8)
MONOCYTES NFR BLD AUTO: 10.2 %
NEUTROPHILS # BLD AUTO: 3.32 X10*3/UL (ref 1.6–5.5)
NEUTROPHILS NFR BLD AUTO: 63.5 %
NON HDL CHOLESTEROL: 89 MG/DL (ref 0–149)
NRBC BLD-RTO: 0 /100 WBCS (ref 0–0)
PLATELET # BLD AUTO: 253 X10*3/UL (ref 150–450)
POTASSIUM SERPL-SCNC: 5.2 MMOL/L (ref 3.5–5.3)
PROT SERPL-MCNC: 6.5 G/DL (ref 6.4–8.2)
PSA SERPL-MCNC: 5.01 NG/ML
RBC # BLD AUTO: 5.39 X10*6/UL (ref 4.5–5.9)
SODIUM SERPL-SCNC: 137 MMOL/L (ref 136–145)
TRIGL SERPL-MCNC: 110 MG/DL (ref 0–149)
VIT B12 SERPL-MCNC: 1367 PG/ML (ref 211–911)
VLDL: 22 MG/DL (ref 0–40)
WBC # BLD AUTO: 5.2 X10*3/UL (ref 4.4–11.3)

## 2023-12-05 PROCEDURE — 82652 VIT D 1 25-DIHYDROXY: CPT

## 2023-12-05 PROCEDURE — 82607 VITAMIN B-12: CPT

## 2023-12-05 PROCEDURE — 83036 HEMOGLOBIN GLYCOSYLATED A1C: CPT

## 2023-12-05 PROCEDURE — 36415 COLL VENOUS BLD VENIPUNCTURE: CPT

## 2023-12-05 PROCEDURE — 84153 ASSAY OF PSA TOTAL: CPT

## 2023-12-05 PROCEDURE — 80053 COMPREHEN METABOLIC PANEL: CPT

## 2023-12-05 PROCEDURE — 80061 LIPID PANEL: CPT

## 2023-12-05 PROCEDURE — 83735 ASSAY OF MAGNESIUM: CPT

## 2023-12-05 PROCEDURE — 85025 COMPLETE CBC W/AUTO DIFF WBC: CPT

## 2023-12-06 LAB
EST. AVERAGE GLUCOSE BLD GHB EST-MCNC: 154 MG/DL
HBA1C MFR BLD: 7 %

## 2023-12-08 ENCOUNTER — OFFICE VISIT (OUTPATIENT)
Dept: CARDIOLOGY | Facility: CLINIC | Age: 73
End: 2023-12-08
Payer: MEDICARE

## 2023-12-08 VITALS
HEART RATE: 67 BPM | DIASTOLIC BLOOD PRESSURE: 78 MMHG | WEIGHT: 159 LBS | BODY MASS INDEX: 25.55 KG/M2 | SYSTOLIC BLOOD PRESSURE: 142 MMHG | HEIGHT: 66 IN

## 2023-12-08 DIAGNOSIS — I35.1 NONRHEUMATIC AORTIC VALVE INSUFFICIENCY: ICD-10-CM

## 2023-12-08 DIAGNOSIS — R07.89 CHEST HEAVINESS: ICD-10-CM

## 2023-12-08 DIAGNOSIS — I50.20 HFREF (HEART FAILURE WITH REDUCED EJECTION FRACTION) (MULTI): ICD-10-CM

## 2023-12-08 DIAGNOSIS — I10 BENIGN ESSENTIAL HYPERTENSION: ICD-10-CM

## 2023-12-08 DIAGNOSIS — E78.2 MIXED HYPERLIPIDEMIA: ICD-10-CM

## 2023-12-08 DIAGNOSIS — I25.10 ATHEROSCLEROSIS OF NATIVE CORONARY ARTERY OF NATIVE HEART WITHOUT ANGINA PECTORIS: Primary | ICD-10-CM

## 2023-12-08 LAB — 1,25(OH)2D3 SERPL-MCNC: 49.7 PG/ML (ref 19.9–79.3)

## 2023-12-08 PROCEDURE — 1160F RVW MEDS BY RX/DR IN RCRD: CPT | Performed by: INTERNAL MEDICINE

## 2023-12-08 PROCEDURE — 99214 OFFICE O/P EST MOD 30 MIN: CPT | Performed by: INTERNAL MEDICINE

## 2023-12-08 PROCEDURE — 4010F ACE/ARB THERAPY RXD/TAKEN: CPT | Performed by: INTERNAL MEDICINE

## 2023-12-08 PROCEDURE — 3048F LDL-C <100 MG/DL: CPT | Performed by: INTERNAL MEDICINE

## 2023-12-08 PROCEDURE — 3077F SYST BP >= 140 MM HG: CPT | Performed by: INTERNAL MEDICINE

## 2023-12-08 PROCEDURE — 93000 ELECTROCARDIOGRAM COMPLETE: CPT | Performed by: INTERNAL MEDICINE

## 2023-12-08 PROCEDURE — 3078F DIAST BP <80 MM HG: CPT | Performed by: INTERNAL MEDICINE

## 2023-12-08 PROCEDURE — 1036F TOBACCO NON-USER: CPT | Performed by: INTERNAL MEDICINE

## 2023-12-08 PROCEDURE — 1159F MED LIST DOCD IN RCRD: CPT | Performed by: INTERNAL MEDICINE

## 2023-12-08 PROCEDURE — 3051F HG A1C>EQUAL 7.0%<8.0%: CPT | Performed by: INTERNAL MEDICINE

## 2023-12-08 RX ORDER — ATORVASTATIN CALCIUM 40 MG/1
40 TABLET, FILM COATED ORAL NIGHTLY
COMMUNITY
Start: 2023-10-31 | End: 2024-02-19

## 2024-01-15 ENCOUNTER — TELEPHONE (OUTPATIENT)
Dept: PHARMACY | Facility: HOSPITAL | Age: 74
End: 2024-01-15
Payer: MEDICARE

## 2024-01-15 NOTE — TELEPHONE ENCOUNTER
Population Health: Outreach by Ambulatory Pharmacy Team    Payor: Ree MOLINA  Reason: Adherence  Medication: Jardiance 25 mg, losartan-hydrochlorothiazide 100-12.5 mg, and atorvastatin 40 mg  Outcome: Patient Reached: Claims Adherence, patient will reach out to his doctor when he needs refills, only takes 1/2 tablet of Jardiance per day    Elham Kim, PharmD

## 2024-02-16 DIAGNOSIS — I10 BENIGN ESSENTIAL HYPERTENSION: Primary | ICD-10-CM

## 2024-02-16 DIAGNOSIS — E78.2 MIXED HYPERLIPIDEMIA: Primary | ICD-10-CM

## 2024-02-19 RX ORDER — ATORVASTATIN CALCIUM 40 MG/1
40 TABLET, FILM COATED ORAL NIGHTLY
Qty: 90 TABLET | Refills: 3 | Status: SHIPPED | OUTPATIENT
Start: 2024-02-19

## 2024-02-19 RX ORDER — LOSARTAN POTASSIUM AND HYDROCHLOROTHIAZIDE 12.5; 1 MG/1; MG/1
1 TABLET ORAL DAILY
Qty: 90 TABLET | Refills: 3 | Status: SHIPPED | OUTPATIENT
Start: 2024-02-19

## 2024-02-19 NOTE — TELEPHONE ENCOUNTER
2/19/24  0904  Called and clarified atorvastatin dose with patient. Patient does report taking atorvastatin and denies any problems with taking it.  Medication profile updated and renewal sent for approval.

## 2024-04-01 DIAGNOSIS — I10 BENIGN ESSENTIAL HYPERTENSION: Primary | ICD-10-CM

## 2024-04-02 RX ORDER — CARVEDILOL 12.5 MG/1
12.5 TABLET ORAL
Qty: 180 TABLET | Refills: 3 | Status: SHIPPED | OUTPATIENT
Start: 2024-04-02 | End: 2025-04-02

## 2024-04-18 ENCOUNTER — OFFICE VISIT (OUTPATIENT)
Dept: PRIMARY CARE | Facility: CLINIC | Age: 74
End: 2024-04-18
Payer: MEDICARE

## 2024-04-18 VITALS
OXYGEN SATURATION: 97 % | BODY MASS INDEX: 24.91 KG/M2 | WEIGHT: 155 LBS | DIASTOLIC BLOOD PRESSURE: 64 MMHG | HEIGHT: 66 IN | HEART RATE: 52 BPM | TEMPERATURE: 97.6 F | SYSTOLIC BLOOD PRESSURE: 122 MMHG

## 2024-04-18 DIAGNOSIS — E78.00 PURE HYPERCHOLESTEROLEMIA: ICD-10-CM

## 2024-04-18 DIAGNOSIS — E11.9 TYPE 2 DIABETES MELLITUS WITHOUT COMPLICATION, WITHOUT LONG-TERM CURRENT USE OF INSULIN (MULTI): ICD-10-CM

## 2024-04-18 DIAGNOSIS — R97.20 ELEVATED PSA: ICD-10-CM

## 2024-04-18 DIAGNOSIS — R39.16 BENIGN PROSTATIC HYPERPLASIA (BPH) WITH STRAINING ON URINATION: ICD-10-CM

## 2024-04-18 DIAGNOSIS — I50.20 HFREF (HEART FAILURE WITH REDUCED EJECTION FRACTION) (MULTI): ICD-10-CM

## 2024-04-18 DIAGNOSIS — Z00.00 MEDICARE ANNUAL WELLNESS VISIT, SUBSEQUENT: Primary | ICD-10-CM

## 2024-04-18 DIAGNOSIS — I25.5 ISCHEMIC CARDIOMYOPATHY: ICD-10-CM

## 2024-04-18 DIAGNOSIS — I25.10 ATHEROSCLEROSIS OF NATIVE CORONARY ARTERY OF NATIVE HEART WITHOUT ANGINA PECTORIS: ICD-10-CM

## 2024-04-18 DIAGNOSIS — N40.1 BENIGN PROSTATIC HYPERPLASIA (BPH) WITH STRAINING ON URINATION: ICD-10-CM

## 2024-04-18 DIAGNOSIS — E53.8 VITAMIN B 12 DEFICIENCY: ICD-10-CM

## 2024-04-18 DIAGNOSIS — I10 BENIGN ESSENTIAL HYPERTENSION: ICD-10-CM

## 2024-04-18 LAB — POC HEMOGLOBIN A1C: 7.1 % (ref 4.2–6.5)

## 2024-04-18 PROCEDURE — 1170F FXNL STATUS ASSESSED: CPT | Performed by: FAMILY MEDICINE

## 2024-04-18 PROCEDURE — 1158F ADVNC CARE PLAN TLK DOCD: CPT | Performed by: FAMILY MEDICINE

## 2024-04-18 PROCEDURE — 3074F SYST BP LT 130 MM HG: CPT | Performed by: FAMILY MEDICINE

## 2024-04-18 PROCEDURE — G0439 PPPS, SUBSEQ VISIT: HCPCS | Performed by: FAMILY MEDICINE

## 2024-04-18 PROCEDURE — 3078F DIAST BP <80 MM HG: CPT | Performed by: FAMILY MEDICINE

## 2024-04-18 PROCEDURE — 1159F MED LIST DOCD IN RCRD: CPT | Performed by: FAMILY MEDICINE

## 2024-04-18 PROCEDURE — 1036F TOBACCO NON-USER: CPT | Performed by: FAMILY MEDICINE

## 2024-04-18 PROCEDURE — 1123F ACP DISCUSS/DSCN MKR DOCD: CPT | Performed by: FAMILY MEDICINE

## 2024-04-18 PROCEDURE — 83036 HEMOGLOBIN GLYCOSYLATED A1C: CPT | Performed by: FAMILY MEDICINE

## 2024-04-18 PROCEDURE — 1160F RVW MEDS BY RX/DR IN RCRD: CPT | Performed by: FAMILY MEDICINE

## 2024-04-18 PROCEDURE — 99214 OFFICE O/P EST MOD 30 MIN: CPT | Performed by: FAMILY MEDICINE

## 2024-04-18 ASSESSMENT — ACTIVITIES OF DAILY LIVING (ADL)
TAKING_MEDICATION: INDEPENDENT
DRESSING: INDEPENDENT
BATHING: INDEPENDENT
GROCERY_SHOPPING: INDEPENDENT
DOING_HOUSEWORK: INDEPENDENT
GROCERY_SHOPPING: INDEPENDENT
MANAGING_FINANCES: INDEPENDENT
TAKING_MEDICATION: INDEPENDENT
MANAGING_FINANCES: INDEPENDENT
DOING_HOUSEWORK: INDEPENDENT

## 2024-04-18 ASSESSMENT — PATIENT HEALTH QUESTIONNAIRE - PHQ9
2. FEELING DOWN, DEPRESSED OR HOPELESS: NOT AT ALL
1. LITTLE INTEREST OR PLEASURE IN DOING THINGS: NOT AT ALL
SUM OF ALL RESPONSES TO PHQ9 QUESTIONS 1 AND 2: 0

## 2024-04-18 NOTE — PROGRESS NOTES
"Subjective   Reason for Visit: Tre Sumner is an 73 y.o. male here for Medicare and follow up on chronic conditions    Past Medical, Surgical, and Family History reviewed and updated in chart.    Reviewed all medications by prescribing practitioner or clinical pharmacist (such as prescriptions, OTCs, herbal therapies and supplements) and documented in the medical record.    HPI  Reviewed chronic medical conditions    Concerns today: none    In general the patient states that his health is:  good    Regular dental visits: Has dentures  Vision problems: has dry eyes and sees Dr Urena.  Eye exam 9/2023  Hearing loss:  Has hearing aides.      Diet: Eating less  Exercise:  He is physically active and goes to the gym  Weight concerns: no  Sleep: Sleep is good with benadryl  Caffeine: 2 cups coffee  Water: good intake    Patient Care Team:  David Ward DO as PCP - General  David Ward DO as PCP - [employer] PCP     Review of Systems  No other complaints  Objective   Vitals:  /64 (BP Location: Left arm, Patient Position: Sitting, BP Cuff Size: Adult)   Pulse 52   Temp 36.4 °C (97.6 °F)   Ht 1.676 m (5' 6\")   Wt 70.3 kg (155 lb)   SpO2 97%   BMI 25.02 kg/m²       Physical Exam  General: Patient is alert and oriented ×3 and appears in no acute distress. No respiratory distress.    Head: Atraumatic normocephalic.    Eyes: EOMI, PERRLA      Ears: Canals patent without any irritation, tympanic membranes without inflammation, no swelling, normal light reflex.  Hearing loss present    Nose: Nares patent. Turbinates are not swollen. No discharge.    Mouth: Normal mucosa. Moist. No erythema, exudates, tonsillar enlargement.    Neck: Normal range of motion, no masses.  Thyroid is palpable and normal in size without any nodules. No anterior cervical or posterior cervical adenopathy.    Heart: Regular rate and rhythm, no murmurs clicks or gallops    Lungs: Clear to auscultation bilaterally without " any rhonchi rales or wheezing, lung sounds heard throughout all lung fields    Abdomen: Soft, nontender, no rigidity, rebound, guarding or organomegaly. Bowel sounds ×4 quadrants.    Musculoskeletal: Normal range of motion, strength is grossly intact in the proximal distal muscles of the upper and lower extremities bilaterally, deep tendon reflexes +2 out of 4 and symmetric bilaterally at the patella, Achilles, biceps, triceps, sensation intact.    Nerves: Cranial nerves II through XII appear grossly intact and without deficit    Skin: Intact, dry, no rashes or erythema    Psych: Normal affect.   Assessment/Plan   Problem List Items Addressed This Visit       Atherosclerotic heart disease of native coronary artery without angina pectoris    Benign essential hypertension    Relevant Orders    POCT glycosylated hemoglobin (Hb A1C) manually resulted    CBC and Auto Differential    Comprehensive Metabolic Panel    Hemoglobin A1C    Lipid Panel    Vitamin B12    Magnesium    BPH (benign prostatic hyperplasia)    Cardiomyopathy (Multi)    Diabetes mellitus, type II (Multi)    Relevant Orders    POCT glycosylated hemoglobin (Hb A1C) manually resulted    CBC and Auto Differential    Comprehensive Metabolic Panel    Hemoglobin A1C    Lipid Panel    Vitamin B12    Magnesium    Elevated PSA    Hyperlipidemia    Relevant Orders    POCT glycosylated hemoglobin (Hb A1C) manually resulted    CBC and Auto Differential    Comprehensive Metabolic Panel    Hemoglobin A1C    Lipid Panel    Vitamin B12    Magnesium    Vitamin B 12 deficiency    Relevant Orders    POCT glycosylated hemoglobin (Hb A1C) manually resulted    CBC and Auto Differential    Comprehensive Metabolic Panel    Hemoglobin A1C    Lipid Panel    Vitamin B12    Magnesium    HFrEF (heart failure with reduced ejection fraction) (Multi)     Other Visit Diagnoses       Medicare annual wellness visit, subsequent    -  Primary        Reviewed in for the patient's past  medical history, surgical history, family history, social history  Depression screening was done in the office today using PHQ-9 and anxiety screening was done using STORM-7.  Memory  was checked using Mini-Mental Status exam today.  Patient scored 30 out of 30  We reviewed the patient's activities of daily living and possible risk for falling.  Patient is stable.  Discussed preventative screenings\  Vaccinations were discussed in the office.  We did review the patient's status on influenza vaccination, pneumonia vaccination, tetanus vaccination, and up to date vaccination  Patient was instructed to follow-up with dentist regularly and also with eye doctor regularly  We discussed advanced directives including power of , living well and DO NOT RESUSCITATE orders    Diabetes mellitus: Controlled  - continue Jardiance 25 mg 1/2 tab daily, Hemoglobin A1c previous 7.1   - Microalbumin was normal April 18, 2023           -   Eye exam March 2024  -On a statin and on angiotensin receptor blocker  - Denies any cut scrapes or sores on the feet or any burning numbness tingling in the hands of the feet    Hypertension- stable  -Continue medication losartan HCTZ 100/12.5 mg daily and Carvedilol 12.5 mg BID.      Hyperlipidemia- stable  - Atorvastatin 40 mg dailycurrently taken and tolerated  -Continue CoQ10 200 mg    Cholesterol 132 mg/dL  VLDL 22 mg/dL   HDL-Cholesterol 43.4 mg/dL  Triglycerides 110 mg/dL    Cholesterol/HDL Ratio 3.0  Non HDL Cholesterol 89 mg/dL    LDL Calculated 67 mg/dL           BPH and elevated PSA- stable  - Have had patient see Urology Dr Joseph.  Is supposed to follow up             Seborrheic Keratosis right elbow.       Follow up in 6 months or as needed

## 2024-05-24 ENCOUNTER — TELEPHONE (OUTPATIENT)
Dept: PHARMACY | Facility: HOSPITAL | Age: 74
End: 2024-05-24

## 2024-06-11 NOTE — PROGRESS NOTES
[facility] Cardiology    Patient Name: Tre Sumner  Patient : 1950      Scribe Attestation  By signing my name below, I, Danilo Schaffer   attest that this documentation has been prepared under the direction and in the presence of Albaro Ovalles DO.      Reason for visit:  This is a 73-year-old male here for follow-up regarding coronary artery disease with prior PCI of his RCA done in , HFrEF with an ejection fraction of 40%, mild aortic and mitral valve regurgitation, hypertension, and dyslipidemia.     HPI:  This is a 73-year-old male here for follow-up regarding coronary artery disease with prior PCI of his RCA done in , HFrEF with an ejection fraction of 40%, mild aortic and mitral valve regurgitation, hypertension, and dyslipidemia.  The patient was last evaluated by me in 2023 at which time I had suggested a stress test which she declined, suggested Imdur which the patient declined, ordered blood work including BMP/BNP/magnesium to be drawn in 6 months, and asked the patient to follow-up in 6 months and sooner if necessary. ECG done today showed sinus rhythm with a heart rate of 58 bpm.  The patient reports tiredness and lightheadedness. He also states that he has some irregular heart beat/palpitations. He denies any associated chest pain or shortness of breath. He states that he had been taking carvedilol once daily instead of twice.  He states that he takes his other medications as prescribed. During my exam, he was resting comfortably on the exam table.              Assessment/Plan:   1. Coronary artery disease  The patient has a history of coronary artery disease status post PCI of his RCA done in .  ECG done today showed sinus rhythm with a heart rate of 58 bpm.    The previously reported intermittent chest heaviness had improved.  Blood pressure appears controlled on exam today.  He should continue his current antihypertensive  medications and antiplatelet therapy.  Echocardiogram done in December 2022 showed mildly reduced left ventricular systolic function with an ejection fraction of 40 to 45%, normal right ventricular systolic function, mild aortic valve regurgitation, mild mitral valve regurgitation.  Lipid panel done in December 2023 showed an LDL of 67 and triglycerides of 110 on atorvastatin 40 mg daily.   Lab works were ordered as noted below.   Echocardiogram was ordered as noted below.   Lab works will be done today and again in 3 months prior to the next visit.   Please see lifestyle recommendations below.  Follow up in 3 months and sooner if necessary.      2. HFrEF  The patient has a history of HFrEF with an ejection fraction of 40%.  Echocardiogram done in December 2022 showed mildly reduced left ventricular systolic function with an ejection fraction of 40 to 45%, normal right ventricular systolic function, mild aortic valve regurgitation, mild mitral valve regurgitation.  He does not appear significantly volume overloaded on exam today.  He should continue current cardiac medications.  Lab works were ordered as noted below.   Echocardiogram was ordered as noted below.   Lab works will be done today and again in 3 months prior to the next visit.   I discussed with him the importance of following a low-sodium heart healthy diet.  Follow up in 3 months and sooner if necessary.     3. Frequent PVCs  ECG done today showed sinus rhythm with a heart rate 58 bpm, left bundle branch block, and frequent PVCs.  He reports having palpitations/irregular heart beat and tiredness.  Echocardiogram and Holter monitor were ordered as noted below.  Lab works will be done today and again in 6 months prior to the next visit.   Patient should follow general recommendations including avoiding excessive alcohol intake, avoiding excessive caffeine intake, staying well-hydrated, getting an appropriate amount of sleep.    4. Aortic valve  regurgitation  The patient has a history of aortic valve regurgitation.  Echocardiogram done in December 2022 showed mildly reduced left ventricular systolic function with an ejection fraction of 40 to 45%, normal right ventricular systolic function, mild aortic valve regurgitation, mild mitral valve regurgitation.  Echocardiogram was ordered as noted below.      5. Hypertension  Patient has a history of hypertension which appears suboptimally controlled on exam today.  He should increase back carvedilol to 12.5 mg twice daily.   He should continue his other current antihypertensive medications and monitor his blood pressure at home.      6. Dyslipidemia  Lipid panel done in December 2023 showed an LDL of 67 and triglycerides of 110 on atorvastatin 40 mg daily.   Lipid panel will be updated in 6 months.  Please see lifestyle recommendations below.     7. Tiredness  The patient reports tiredness as noted in the HPI.    8. Lightheadedness  The patient reports lightheadedness and states that he had self reduced carvedilol to once daily instead of twice.  He should increase back carvedilol to 12.5 mg twice daily.   Blood  pressure appears suboptimally controlled on exam today.  Patient should follow general recommendations including staying well-hydrated, changing the positions slowly, wearing compression stockings as necessary, and routine exercise.        Orders:   Restart carvedilol to 12.5 mg twice daily (patient reports he was taking once daily).  BMP/BNP/Mg/TSH  CMP/lipid/magnesium/CBC/BNP in 3 months,   Echocardiogram   Holter monitor  Follow-up in 3 months.    Lifestyle Recommendations  I recommend a whole-food plant-based diet, an eating pattern that encourages the consumption of unrefined plant foods (such as fruits, vegetables, tubers, whole grains, legumes, nuts and seeds) and discourages meats, dairy products, eggs and processed foods.     The AHA/ACC recommends that the patient consume a dietary pattern  that emphasizes intake of vegetables, fruits, and whole grains; includes low-fat dairy products, poultry, fish, legumes, non-tropical vegetable oils, and nuts; and limits intake of sodium, sweets, sugar-sweetened beverages, and red meats.  Adapt this dietary pattern to appropriate calorie requirements (a 500-750 kcal/day deficit to loose weight), personal and cultural food preferences, and nutrition therapy for other medical conditions (including diabetes).  Achieve this pattern by following plans such as the [place] Mediterranean, DASH dietary pattern, or AHA diet.     Engage in 2 hours and 30 minutes per week of moderate-intensity physical activity, or 1 hour and 15 minutes (75 minutes) per week of vigorous-intensity aerobic physical activity, or an equivalent combination of moderate and vigorous-intensity aerobic physical activity. Aerobic activity should be performed in episodes of at least 10 minutes preferably spread throughout the week.     Adhering to a heart healthy diet, regular exercise habits, avoidance of tobacco products, and maintenance of a healthy weight are crucial components of their heart disease risk reduction.     Any positive review of systems not specifically addressed in the office visit today should be evaluated and treated by the patients primary care physician or in an emergency department if necessary     Patient was notified that results from ordered tests will be called to the patient if it changes current management; it will otherwise be discussed at a future appointment and available on [facility].     Thank you for allowing me to participate in the care of this patient.        This document was generated using the assistance of voice recognition software. If there are any errors of spelling, grammar, syntax, or meaning; please feel free to contact me directly for clarification.    Past Medical History:  He has a past medical history of Abnormal electrocardiogram (ECG) (EKG), Encounter  "for screening for malignant neoplasm of prostate (02/16/2021), Other conditions influencing health status (01/05/2022), and Personal history of other diseases of the musculoskeletal system and connective tissue.    Past Surgical History:  He has a past surgical history that includes Other surgical history (12/01/2016); Shoulder surgery (12/01/2016); Other surgical history (12/01/2016); and Hemorrhoid surgery (12/01/2016).      Social History:  He reports that he quit smoking about 50 years ago. His smoking use included cigarettes. He has never used smokeless tobacco. He reports that he does not drink alcohol and does not use drugs.    Family History:  No family history on file.     Allergies:  Aspirin, Penicillins, and Statins-hmg-coa reductase inhibitors    Outpatient Medications:  Current Outpatient Medications   Medication Instructions    atorvastatin (LIPITOR) 40 mg, oral, Nightly    carvedilol (COREG) 12.5 mg, oral, 2 times daily after meals    clopidogrel (PLAVIX) 75 mg, oral, Daily    cyanocobalamin, vitamin B-12, 1,000 mcg tablet, sublingual DISSOLVE 1 TABLET UNDER THE TONGUE ONCE A DAY    fexofenadine (Allegra) 180 mg tablet 1 tablet, oral, Daily    Jardiance 25 mg, oral, Daily    losartan-hydrochlorothiazide (Hyzaar) 100-12.5 mg tablet 1 tablet, oral, Daily    pedi multivit no.17 w-fluoride (Poly-Vi-Liane) 0.5 mg tablet,chewable 1 tablet, oral, Daily        ROS:  A 14 point review of systems was done and is negative other than as stated in HPI    Vitals:      12/28/2022     9:02 AM 2/22/2023    10:51 AM 4/18/2023     8:34 AM 6/26/2023     1:25 PM 10/18/2023    10:17 AM 12/8/2023    11:29 AM 4/18/2024    10:31 AM   Vitals   Systolic 150 152 140 162 130 142 122   Diastolic 82 100 78 88 72 78 64   Heart Rate 60 66 71 63 64 67 52   Temp   36.4 °C (97.5 °F)  36.4 °C (97.5 °F)  36.4 °C (97.6 °F)   Resp 16         Height (in) 1.651 m (5' 5\") 1.651 m (5' 5\") 1.651 m (5' 5\") 1.651 m (5' 5\") 1.651 m (5' 5\") 1.676 m " "(5' 6\") 1.676 m (5' 6\")   Weight (lb) 156 159 156 157.13 157 159 155   BMI 25.96 kg/m2 26.46 kg/m2 25.96 kg/m2 26.15 kg/m2 26.13 kg/m2 25.66 kg/m2 25.02 kg/m2   BSA (m2) 1.8 m2 1.82 m2 1.8 m2 1.81 m2 1.81 m2 1.83 m2 1.81 m2        Physical Exam:   Constitutional: Cooperative, in no acute distress, alert, appears stated age.   Skin: Skin color, texture, turgor normal. No rashes or lesions.   Head: Normocephalic. No masses, lesions, tenderness or abnormalities   Eyes: Extraocular movements are grossly intact.   Mouth and throat: Mucous membranes moist   Neck: Neck supple, no carotid bruits, no JVD   Respiratory: Lungs clear to auscultation, no wheezing or rhonchi, no use of accessory muscles   Chest wall: No scars, normal excursion with respiration   Cardiovascular: Regular rhythm, + murmur  Gastrointestinal: Abdomen soft, nontender. Bowel sounds normal.   Musculoskeletal: Strength equal in upper extremities   Extremities: Trace pitting edema  Neurologic: Sensation grossly intact, alert and oriented x3        Intake/Output:   No intake/output data recorded.    Outpatient Medications  Current Outpatient Medications on File Prior to Visit   Medication Sig Dispense Refill    atorvastatin (Lipitor) 40 mg tablet TAKE 1 TABLET BY MOUTH AT BEDTIME 90 tablet 3    carvedilol (Coreg) 12.5 mg tablet Take 1 tablet (12.5 mg) by mouth 2 times a day after meals. 180 tablet 3    clopidogrel (Plavix) 75 mg tablet Take 1 tablet (75 mg) by mouth once daily. 90 tablet 3    cyanocobalamin, vitamin B-12, 1,000 mcg tablet, sublingual DISSOLVE 1 TABLET UNDER THE TONGUE ONCE A DAY      fexofenadine (Allegra) 180 mg tablet Take 1 tablet (180 mg) by mouth once daily.      Jardiance 25 mg Take 1 tablet (25 mg) by mouth once daily. 90 tablet 3    losartan-hydrochlorothiazide (Hyzaar) 100-12.5 mg tablet TAKE ONE TABLET BY MOUTH DAILY 90 tablet 3    pedi multivit no.17 w-fluoride (Poly-Vi-Liane) 0.5 mg tablet,chewable Chew 1 tablet once daily.   "     No current facility-administered medications on file prior to visit.       Labs: (past 26 weeks)  Recent Results (from the past 4368 hour(s))   POCT glycosylated hemoglobin (Hb A1C) manually resulted    Collection Time: 04/18/24 11:16 AM   Result Value Ref Range    POC HEMOGLOBIN A1c 7.1 (A) 4.2 - 6.5 %       ECG  Encounter Date: 12/08/23   ECG 12 Lead    Narrative    Sinus rhythm, PVC, left bundle branch block, heart rate 67 bpm.       Echocardiogram  No results found for this or any previous visit from the past 1095 days.      CV Studies:  EKG:  Encounter Date: 12/08/23   ECG 12 Lead    Narrative    Sinus rhythm, PVC, left bundle branch block, heart rate 67 bpm.     Echocardiogram:   [place] [address]  Phone [phone] Fax [phone]    TRANSTHORACIC ECHOCARDIOGRAM REPORT      Patient Name:     MAIKEL KARISSA Ignacio Physician:  [id number] Davy CONTE MD  Study Date:       12/5/2022       Referring           [id number] TILA FAGAN  Physician:  MRN/PID:          [id number]        PCP:  Accession/Order#: [id number]    [facility]  Location:  YOB: 1950       Fellow:  Gender:           M               Nurse:  Admit Date:       12/5/2022       Sonographer:        Shruti De Oliveira RDCS  Admission Status: Outpatient      Additional Staff:  Height:           165.10 cm       CC Report to:  Weight:           71.22 kg        Study Type:         Echocardiogram  BSA:              1.78 m2  Blood Pressure: 176 /102 mmHg    Diagnosis/ICD: I25.10-Atherosclerotic heart disease of native coronary artery  without angina pectoris; I10-Essential (primary) hypertension;  I35.1-Nonrheumatic aortic (valve) insufficiency  Indication:    CAD, HTN, AI  Procedure/CPT: Echo Complete w Full Doppler-[id number]  Study Detail: The following Echo studies were performed: 2D, M-Mode, Doppler  and  color flow.      PHYSICIAN INTERPRETATION:  Left Ventricle: Left ventricular systolic function is mildly decreased, with an estimated ejection fraction of 40-45%. There is global hypokinesis of the left ventricle with minor regional variations. The left ventricular cavity size is normal. The left ventricular septal wall thickness is mildly increased. There is mild to moderate concentric left ventricular hypertrophy. Spectral Doppler shows an impaired relaxation pattern of left ventricular diastolic filling.  Left Atrium: The left atrium is mildly dilated.  Right Ventricle: The right ventricle is normal in size. There is normal right ventricular global systolic function.  Right Atrium: The right atrium is normal in size.  Aortic Valve: The aortic valve is trileaflet. There is mild aortic valve regurgitation. The mean gradient of the aortic valve is 6.0 mmHg.  Mitral Valve: The mitral valve is normal in structure. There is mild mitral valve regurgitation.  Tricuspid Valve: The tricuspid valve is structurally normal. There is trace tricuspid regurgitation.  Pulmonic Valve: The pulmonic valve is structurally normal. There is trace pulmonic valve regurgitation.  Pericardium: There is no pericardial effusion noted.  Aorta: The aortic root is normal.  Systemic Veins: The inferior vena cava appears to be of normal size. There is IVC inspiratory collapse greater than 50%.      CONCLUSIONS:  1. Left ventricular systolic function is mildly decreased with a 40-45% estimated ejection fraction.  2. Spectral Doppler shows an impaired relaxation pattern of left ventricular diastolic filling.  3. Mild aortic valve regurgitation.  4. There is global hypokinesis of the left ventricle with minor regional variations.    QUANTITATIVE DATA SUMMARY:  2D MEASUREMENTS:  Normal Ranges:  IVSd:          1.50 cm    (0.6-1.1cm)  LVPWd:         1.30 cm    (0.6-1.1cm)  LVIDd:         3.60 cm    (3.9-5.9cm)  LVIDs:         2.60 cm  LV Mass  Index: 100.8 g/m2  LV % FS        27.8 %    LA VOLUME:  Normal Ranges:  LA Vol A4C:        42.6 ml    (22+/-6mL/m2)  LA Vol A2C:        53.0 ml  LA Vol BP:         52.8 ml  LA Vol Index A4C:  23.8ml/m2  LA Vol Index A2C:  29.7 ml/m2  LA Vol Index BP:   29.6 ml/m2  LA Area A4C:       14.5 cm2  LA Area A2C:       18.0 cm2  LA Major Axis A4C: 4.2 cm  LA Major Axis A2C: 5.2 cm  LA Volume Index:   27.0 ml/m2  LA Vol A4C:        38.0 ml  LA Vol A2C:        49.0 ml    RA VOLUME BY A/L METHOD:  Normal Ranges:  RA Area A4C: 15.1 cm2    M-MODE MEASUREMENTS:  Normal Ranges:  Ao Root: 3.40 cm (2.0-3.7cm)  AoV Exc: 2.30 cm (1.5-2.5cm)  LAs:     3.20 cm (2.7-4.0cm)    AORTA MEASUREMENTS:  Normal Ranges:  AoV Exc:     2.30 cm (1.5-2.5cm)  Ao Sinus, d: 3.00 cm (2.1-3.5cm)  Ao STJ, d:   2.10 cm (1.7-3.4cm)  Asc Ao, d:   3.20 cm (2.1-3.4cm)  Ao Arch:     3.00 cm (2.0-3.6cm)    LV SYSTOLIC FUNCTION BY 2D PLANIMETRY (MOD):  Normal Ranges:  EF-A4C View: 45.6 % (>=55%)  EF-A2C View: 44.9 %  EF-Biplane:  44.4 %    LV DIASTOLIC FUNCTION:  Normal Ranges:  MV Peak E:    0.49 m/s    (0.7-1.2 m/s)  MV Peak A:    0.91 m/s    (0.42-0.7 m/s)  E/A Ratio:    0.54        (1.0-2.2)  MV e'         0.04 m/s    (>8.0)  MV lateral e' 0.05 m/s  MV medial e'  0.04 m/s  MV A Dur:     176.00 msec  E/e' Ratio:   11.36       (<8.0)    MITRAL VALVE:  Normal Ranges:  MV DT: 299 msec (150-240msec)    MITRAL INSUFFICIENCY:  Normal Ranges:  MR VTI:  128.00 cm  MR Vmax: 486.00 cm/s    AORTIC VALVE:  Normal Ranges:  AoV Mean P.0 mmHg (1.7-11.5mmHg)  LVOT Max Elder:            0.99 m/s (<=1.1m/s)  AoV VTI:                 33.50 cm (18-25cm)  LVOT VTI:                19.00 cm  LVOT Diameter:           2.10 cm  (1.8-2.4cm)  AoV Area, VTI:           1.96 cm2 (2.5-5.5cm2)  AoV Dimensionless Index: 0.57    AORTIC INSUFFICIENCY:  AI Vmax:      3.70 m/s  AI Half-time: 662 msec    RIGHT VENTRICLE:  RV 1   3.5 cm  RV 2   2.8 cm  RV 3   6.0 cm  TAPSE: 23.8 mm  RV  s'  0.11 m/s    TRICUSPID VALVE/RVSP:  Normal Ranges:  Peak TR Velocity: 2.47 m/s  RV Syst Pressure: 27.4 mmHg (< 30mmHg)      [id number] Davy Will MD  Electronically signed on 12/6/2022 at 5:17:40 PM         Final     Stress Testing DANIELLA([id number]):   NM CARDIAC STRESS REST (MYOCARDIAL PERFUSION MIBI) 01/25/2023    Narrative  MRN: [id number]  Patient Name: MAIKEL CONTE    STUDY:  CARDIAC STRESS/REST INJECTION; PART 2 STRESS OR REST (NO CHARGE);  CARDIAC STRESS/REST (MYOCARDIAL PERFUSION/MIBI);  1/25/2023 10:29 am    INDICATION:  -  I25.10: Atherosclerotic heart disease of native coronary artery  without angina pectoris.    COMPARISON:  None.    ACCESSION NUMBER(S):  [id number]; [id number]; [id number]    ORDERING CLINICIAN:  TILA FAGAN    TECHNIQUE:  DIVISION OF NUCLEAR MEDICINE  PHARMACOLOGIC STRESS MYOCARDIAL PERFUSION SCAN, ONE DAY PROTOCOL    The patient received an intravenous dose of  11.1 mCi of Tc-99m  tetrofosmin and resting emission tomographic (SPECT) images of the  myocardium were acquired. The patient then received an intravenous  infusion of 0.4mg regadenoson (Lexiscan) followed by an additional  dose of  33.6 mCi of Tc-99m tetrofosmin. Stress phase SPECT images of  the myocardium were then acquired. These included ECG-gated images to  assess and quantify ventricular function.    FINDINGS:  There is a small fixed perfusion defect inferior wall which resolves  on prone imaging suggesting diaphragmatic attenuation. No clear  reversible perfusion defects seen.    Calculated ejection fraction of 43% with global hypokinesis.    Impression  1. There is a small fixed perfusion defect inferior wall which  resolves on prone imaging suggesting diaphragmatic attenuation. No  clear reversible perfusion defects seen. There is a low probability  of ischemia.    2. Calculated ejection fraction of 43% with global hypokinesis.    Cardiac Catheterization: No results found for this or any previous visit from  the past 1825 days.  No results found for this or any previous visit from the past 3650 days.     Cardiac Scoring: No results found for this or any previous visit from the past 1825 days.    AAA : No results found for this or any previous visit from the past 1825 days.    OTHER: No results found for this or any previous visit from the past 1825 days.    LAST IMAGING RESULTS  ECG 12 Lead  Sinus rhythm, PVC, left bundle branch block, heart rate 67 bpm.    Problem List Items Addressed This Visit       Atherosclerotic heart disease of native coronary artery without angina pectoris - Primary    Benign essential hypertension    Hyperlipidemia    Aortic valve regurgitation    HFrEF (heart failure with reduced ejection fraction) (Multi)          Albaro Ovalles DO, FACC, FACOI

## 2024-06-13 DIAGNOSIS — I25.10 ATHEROSCLEROSIS OF NATIVE CORONARY ARTERY OF NATIVE HEART WITHOUT ANGINA PECTORIS: ICD-10-CM

## 2024-06-13 RX ORDER — CLOPIDOGREL BISULFATE 75 MG/1
75 TABLET ORAL DAILY
Qty: 90 TABLET | Refills: 3 | Status: SHIPPED | OUTPATIENT
Start: 2024-06-13

## 2024-06-24 ENCOUNTER — APPOINTMENT (OUTPATIENT)
Dept: CARDIOLOGY | Facility: CLINIC | Age: 74
End: 2024-06-24
Payer: MEDICARE

## 2024-06-24 ENCOUNTER — ANCILLARY PROCEDURE (OUTPATIENT)
Dept: CARDIOLOGY | Facility: CLINIC | Age: 74
End: 2024-06-24
Payer: MEDICARE

## 2024-06-24 VITALS
HEIGHT: 66 IN | HEART RATE: 58 BPM | BODY MASS INDEX: 24.91 KG/M2 | DIASTOLIC BLOOD PRESSURE: 84 MMHG | SYSTOLIC BLOOD PRESSURE: 162 MMHG | WEIGHT: 155 LBS

## 2024-06-24 DIAGNOSIS — I50.20 HFREF (HEART FAILURE WITH REDUCED EJECTION FRACTION) (MULTI): ICD-10-CM

## 2024-06-24 DIAGNOSIS — E78.2 MIXED HYPERLIPIDEMIA: ICD-10-CM

## 2024-06-24 DIAGNOSIS — I10 BENIGN ESSENTIAL HYPERTENSION: ICD-10-CM

## 2024-06-24 DIAGNOSIS — I35.1 NONRHEUMATIC AORTIC VALVE INSUFFICIENCY: ICD-10-CM

## 2024-06-24 DIAGNOSIS — E78.00 PURE HYPERCHOLESTEROLEMIA: ICD-10-CM

## 2024-06-24 DIAGNOSIS — I49.3 FREQUENT PVCS: ICD-10-CM

## 2024-06-24 DIAGNOSIS — I25.10 ATHEROSCLEROSIS OF NATIVE CORONARY ARTERY OF NATIVE HEART WITHOUT ANGINA PECTORIS: ICD-10-CM

## 2024-06-24 DIAGNOSIS — I25.10 ATHEROSCLEROSIS OF NATIVE CORONARY ARTERY OF NATIVE HEART WITHOUT ANGINA PECTORIS: Primary | ICD-10-CM

## 2024-06-24 LAB — BODY SURFACE AREA: 1.81 M2

## 2024-06-24 PROCEDURE — 93000 ELECTROCARDIOGRAM COMPLETE: CPT | Performed by: INTERNAL MEDICINE

## 2024-06-24 PROCEDURE — 3077F SYST BP >= 140 MM HG: CPT | Performed by: INTERNAL MEDICINE

## 2024-06-24 PROCEDURE — 99214 OFFICE O/P EST MOD 30 MIN: CPT | Performed by: INTERNAL MEDICINE

## 2024-06-24 PROCEDURE — 3079F DIAST BP 80-89 MM HG: CPT | Performed by: INTERNAL MEDICINE

## 2024-06-24 PROCEDURE — 1159F MED LIST DOCD IN RCRD: CPT | Performed by: INTERNAL MEDICINE

## 2024-06-24 PROCEDURE — 1036F TOBACCO NON-USER: CPT | Performed by: INTERNAL MEDICINE

## 2024-06-24 PROCEDURE — 1123F ACP DISCUSS/DSCN MKR DOCD: CPT | Performed by: INTERNAL MEDICINE

## 2024-06-25 ENCOUNTER — LAB (OUTPATIENT)
Dept: LAB | Facility: LAB | Age: 74
End: 2024-06-25
Payer: MEDICARE

## 2024-06-25 DIAGNOSIS — I25.10 ATHEROSCLEROSIS OF NATIVE CORONARY ARTERY OF NATIVE HEART WITHOUT ANGINA PECTORIS: ICD-10-CM

## 2024-06-25 DIAGNOSIS — I49.3 FREQUENT PVCS: ICD-10-CM

## 2024-06-25 DIAGNOSIS — E78.2 MIXED HYPERLIPIDEMIA: ICD-10-CM

## 2024-06-25 DIAGNOSIS — E78.00 PURE HYPERCHOLESTEROLEMIA: ICD-10-CM

## 2024-06-25 DIAGNOSIS — I50.20 HFREF (HEART FAILURE WITH REDUCED EJECTION FRACTION) (MULTI): ICD-10-CM

## 2024-06-25 DIAGNOSIS — I10 BENIGN ESSENTIAL HYPERTENSION: ICD-10-CM

## 2024-06-25 DIAGNOSIS — I35.1 NONRHEUMATIC AORTIC VALVE INSUFFICIENCY: ICD-10-CM

## 2024-06-25 LAB
ANION GAP SERPL CALC-SCNC: 12 MMOL/L (ref 10–20)
BNP SERPL-MCNC: 380 PG/ML (ref 0–99)
BUN SERPL-MCNC: 17 MG/DL (ref 6–23)
CALCIUM SERPL-MCNC: 9.3 MG/DL (ref 8.6–10.3)
CHLORIDE SERPL-SCNC: 103 MMOL/L (ref 98–107)
CO2 SERPL-SCNC: 27 MMOL/L (ref 21–32)
CREAT SERPL-MCNC: 0.83 MG/DL (ref 0.5–1.3)
EGFRCR SERPLBLD CKD-EPI 2021: >90 ML/MIN/1.73M*2
GLUCOSE SERPL-MCNC: 239 MG/DL (ref 74–99)
MAGNESIUM SERPL-MCNC: 1.91 MG/DL (ref 1.6–2.4)
POTASSIUM SERPL-SCNC: 4.5 MMOL/L (ref 3.5–5.3)
SODIUM SERPL-SCNC: 137 MMOL/L (ref 136–145)
TSH SERPL-ACNC: 2.38 MIU/L (ref 0.44–3.98)

## 2024-06-25 PROCEDURE — 83735 ASSAY OF MAGNESIUM: CPT

## 2024-06-25 PROCEDURE — 83880 ASSAY OF NATRIURETIC PEPTIDE: CPT

## 2024-06-25 PROCEDURE — 36415 COLL VENOUS BLD VENIPUNCTURE: CPT

## 2024-06-25 PROCEDURE — 80048 BASIC METABOLIC PNL TOTAL CA: CPT

## 2024-06-25 PROCEDURE — 84443 ASSAY THYROID STIM HORMONE: CPT

## 2024-06-26 ENCOUNTER — TELEPHONE (OUTPATIENT)
Dept: CARDIOLOGY | Facility: CLINIC | Age: 74
End: 2024-06-26
Payer: MEDICARE

## 2024-06-26 NOTE — TELEPHONE ENCOUNTER
6/27/24  Called results to patient and plan for five day course of furosemide 20 mg and KCL 20 mEq Daily.  Patient verbalized understanding of results and is agreeable to plan.      New Rx for KCL and Lasix sent for approval.       6/26/24  1022  Called patient; not home. Left message for patient to return call for results and directives from Dr. Ovalles.        ----- Message from Albaro Ovalles DO sent at 6/25/2024  1:55 PM EDT -----  Please call and inform the patient that blood work shows normal serum sodium and potassium with a serum creatinine 0.83, serum magnesium was 1.91, BNP mildly elevated at 380.  I would like the patient to start furosemide 20 mg daily and KCl 20 mill equivalents daily for 5 days then stop.  Patient needs to follow a low-sodium heart healthy diet.

## 2024-06-27 DIAGNOSIS — I50.20 HFREF (HEART FAILURE WITH REDUCED EJECTION FRACTION) (MULTI): Primary | ICD-10-CM

## 2024-06-27 RX ORDER — FUROSEMIDE 20 MG/1
20 TABLET ORAL DAILY
Qty: 5 TABLET | Refills: 0 | Status: SHIPPED | OUTPATIENT
Start: 2024-06-27 | End: 2025-06-27

## 2024-06-27 RX ORDER — POTASSIUM CHLORIDE 20 MEQ/1
20 TABLET, EXTENDED RELEASE ORAL DAILY
Qty: 5 TABLET | Refills: 0 | Status: SHIPPED | OUTPATIENT
Start: 2024-06-27

## 2024-07-10 LAB — BODY SURFACE AREA: 1.81 M2

## 2024-07-11 ENCOUNTER — HOSPITAL ENCOUNTER (OUTPATIENT)
Dept: CARDIOLOGY | Facility: HOSPITAL | Age: 74
Discharge: HOME | End: 2024-07-11
Payer: MEDICARE

## 2024-07-11 ENCOUNTER — TELEPHONE (OUTPATIENT)
Dept: CARDIOLOGY | Facility: CLINIC | Age: 74
End: 2024-07-11

## 2024-07-11 DIAGNOSIS — I35.1 NONRHEUMATIC AORTIC VALVE INSUFFICIENCY: ICD-10-CM

## 2024-07-11 DIAGNOSIS — I49.3 FREQUENT PVCS: Primary | ICD-10-CM

## 2024-07-11 DIAGNOSIS — E78.00 PURE HYPERCHOLESTEROLEMIA: ICD-10-CM

## 2024-07-11 DIAGNOSIS — I47.20 VENTRICULAR TACHYARRHYTHMIA (MULTI): ICD-10-CM

## 2024-07-11 DIAGNOSIS — I25.10 ATHEROSCLEROSIS OF NATIVE CORONARY ARTERY OF NATIVE HEART WITHOUT ANGINA PECTORIS: ICD-10-CM

## 2024-07-11 DIAGNOSIS — R94.31 ABNORMAL ELECTROCARDIOGRAM (ECG) (EKG): ICD-10-CM

## 2024-07-11 DIAGNOSIS — I49.3 FREQUENT PVCS: ICD-10-CM

## 2024-07-11 DIAGNOSIS — I10 BENIGN ESSENTIAL HYPERTENSION: ICD-10-CM

## 2024-07-11 DIAGNOSIS — E78.2 MIXED HYPERLIPIDEMIA: ICD-10-CM

## 2024-07-11 DIAGNOSIS — I50.20 HFREF (HEART FAILURE WITH REDUCED EJECTION FRACTION) (MULTI): ICD-10-CM

## 2024-07-11 LAB
AORTIC VALVE MEAN GRADIENT: 6 MMHG
AORTIC VALVE PEAK VELOCITY: 1.69 M/S
AV PEAK GRADIENT: 11.5 MMHG
AVA (PEAK VEL): 2.59 CM2
AVA (VTI): 3.06 CM2
EJECTION FRACTION APICAL 4 CHAMBER: 56.9
EJECTION FRACTION: 43 %
GLOBAL LONGITUDINAL STRAIN: 8.4 %
LEFT ATRIUM VOLUME AREA LENGTH INDEX BSA: 34.8 ML/M2
LEFT VENTRICLE INTERNAL DIMENSION DIASTOLE: 5.72 CM (ref 3.5–6)
LEFT VENTRICULAR OUTFLOW TRACT DIAMETER: 2.04 CM
LV EJECTION FRACTION BIPLANE: 58 %
MITRAL VALVE E/A RATIO: 0.57
MITRAL VALVE E/E' RATIO: 10.93
RIGHT VENTRICLE FREE WALL PEAK S': 10.76 CM/S
RIGHT VENTRICLE PEAK SYSTOLIC PRESSURE: 34.9 MMHG
TRICUSPID ANNULAR PLANE SYSTOLIC EXCURSION: 2.2 CM

## 2024-07-11 PROCEDURE — 93306 TTE W/DOPPLER COMPLETE: CPT

## 2024-07-11 PROCEDURE — 93306 TTE W/DOPPLER COMPLETE: CPT | Performed by: INTERNAL MEDICINE

## 2024-07-11 NOTE — TELEPHONE ENCOUNTER
7/11/24 1849  Called Holter  results to patient and plan for Regadenoson stress test and EP referral. Patient verbalized understanding of results and is agreeable to plan; also reports taking coreg as prescribed.    Orders placed for EP referral and Regadesonon stress test with a message sent to Yudi for scheduling.      ----- Message from Albaro Ovalles sent at 7/11/2024 12:02 PM EDT -----  Holter monitor showing frequent ventricular ectopy with a burden of 19.9%, 19 episodes of NSVT the longest lasting 4 beats.  Make sure patient is taking carvedilol twice daily as recommended at his recent office visit.  Blood work done 6/25/2024 showed a serum potassium of 4.5 and serum magnesium of 1.91.  Patient does have known LV dysfunction and repeat echocardiogram has been ordered.  Please refer patient for a Lexiscan nuclear stress.  Refer patient to EP cardiology.

## 2024-07-15 ENCOUNTER — TELEPHONE (OUTPATIENT)
Dept: CARDIOLOGY | Facility: CLINIC | Age: 74
End: 2024-07-15
Payer: MEDICARE

## 2024-07-15 DIAGNOSIS — I50.20 HFREF (HEART FAILURE WITH REDUCED EJECTION FRACTION) (MULTI): ICD-10-CM

## 2024-07-15 DIAGNOSIS — I25.10 ATHEROSCLEROSIS OF NATIVE CORONARY ARTERY OF NATIVE HEART WITHOUT ANGINA PECTORIS: Primary | ICD-10-CM

## 2024-07-15 DIAGNOSIS — R94.31 ABNORMAL ELECTROCARDIOGRAM (ECG) (EKG): ICD-10-CM

## 2024-07-15 NOTE — TELEPHONE ENCOUNTER
7/15/24  1232  Called echocardiogram results to patient and plan for testing to rule cardiac amyloidosis; explained the amyloidosis to patient.  Patient verbalized understanding of results and is agreeable to plan.      Serum, urine and PYP nuclear study ordered. Will task for scheduling.      ----- Message from Albaro Ovalles sent at 7/11/2024  4:03 PM EDT -----  Mildly reduced left ventricular systolic function with an ejection fraction of 40 to 45%, reduced LV strain at -8.4% with apical sparing pattern, normal right ventricular systolic function, mild to moderate mitral valve regurgitation, mild aortic valve regurgitation.  Please refer patient for amyloid testing including serum/urine protein electrophoresis with immunofixation, serum kappa/lambda, nuclear medicine PYP amyloid scan.

## 2024-07-18 ENCOUNTER — LAB (OUTPATIENT)
Dept: LAB | Facility: LAB | Age: 74
End: 2024-07-18
Payer: MEDICARE

## 2024-07-18 DIAGNOSIS — I50.20 HFREF (HEART FAILURE WITH REDUCED EJECTION FRACTION) (MULTI): ICD-10-CM

## 2024-07-18 LAB — PROT SERPL-MCNC: 6.7 G/DL (ref 6.4–8.2)

## 2024-07-18 PROCEDURE — 83521 IG LIGHT CHAINS FREE EACH: CPT

## 2024-07-18 PROCEDURE — 84155 ASSAY OF PROTEIN SERUM: CPT

## 2024-07-18 PROCEDURE — 84165 PROTEIN E-PHORESIS SERUM: CPT

## 2024-07-18 PROCEDURE — 36415 COLL VENOUS BLD VENIPUNCTURE: CPT

## 2024-07-19 ENCOUNTER — LAB (OUTPATIENT)
Dept: LAB | Facility: LAB | Age: 74
End: 2024-07-19
Payer: MEDICARE

## 2024-07-19 DIAGNOSIS — I50.20 HFREF (HEART FAILURE WITH REDUCED EJECTION FRACTION) (MULTI): ICD-10-CM

## 2024-07-19 LAB
KAPPA LC SERPL-MCNC: 3.05 MG/DL (ref 0.33–1.94)
KAPPA LC/LAMBDA SER: 1.55 {RATIO} (ref 0.26–1.65)
LAMBDA LC SERPL-MCNC: 1.97 MG/DL (ref 0.57–2.63)
PROT UR-ACNC: <4 MG/DL (ref 5–25)

## 2024-07-19 PROCEDURE — 84166 PROTEIN E-PHORESIS/URINE/CSF: CPT

## 2024-07-19 PROCEDURE — 86335 IMMUNFIX E-PHORSIS/URINE/CSF: CPT

## 2024-07-19 PROCEDURE — 84156 ASSAY OF PROTEIN URINE: CPT

## 2024-07-23 ENCOUNTER — TELEPHONE (OUTPATIENT)
Dept: CARDIOLOGY | Facility: CLINIC | Age: 74
End: 2024-07-23
Payer: MEDICARE

## 2024-07-23 DIAGNOSIS — R76.8 ELEVATED SERUM IMMUNOGLOBULIN FREE LIGHT CHAINS: Primary | ICD-10-CM

## 2024-07-23 NOTE — TELEPHONE ENCOUNTER
7/23/24  1000  Called results to patient and plan for hematology referral.  Patient verbalized understanding of results and is agreeable to plan.      Referral to hematology placed and message sent to Jeffry to schedule.    ----- Message from Albaro Ovalles sent at 7/19/2024  3:46 PM EDT -----  Ig kappa free light chain elevated at 3.05.  Please refer patient to hematology.

## 2024-07-24 ENCOUNTER — HOSPITAL ENCOUNTER (OUTPATIENT)
Dept: RADIOLOGY | Facility: HOSPITAL | Age: 74
Discharge: HOME | End: 2024-07-24
Payer: MEDICARE

## 2024-07-24 ENCOUNTER — HOSPITAL ENCOUNTER (OUTPATIENT)
Dept: CARDIOLOGY | Facility: HOSPITAL | Age: 74
Discharge: HOME | End: 2024-07-24
Payer: MEDICARE

## 2024-07-24 DIAGNOSIS — I47.20 VENTRICULAR TACHYARRHYTHMIA (MULTI): ICD-10-CM

## 2024-07-24 DIAGNOSIS — R94.31 ABNORMAL ELECTROCARDIOGRAM (ECG) (EKG): ICD-10-CM

## 2024-07-24 DIAGNOSIS — I49.3 FREQUENT PVCS: ICD-10-CM

## 2024-07-24 LAB
ALBUMIN: 4 G/DL (ref 3.4–5)
ALPHA 1 GLOBULIN: 0.3 G/DL (ref 0.2–0.6)
ALPHA 2 GLOBULIN: 0.7 G/DL (ref 0.4–1.1)
BETA GLOBULIN: 0.8 G/DL (ref 0.5–1.2)
GAMMA GLOBULIN: 0.9 G/DL (ref 0.5–1.4)
PATH REVIEW-SERUM PROTEIN ELECTROPHORESIS: NORMAL
PROTEIN ELECTROPHORESIS COMMENT: NORMAL

## 2024-07-24 PROCEDURE — 3430000001 HC RX 343 DIAGNOSTIC RADIOPHARMACEUTICALS: Performed by: INTERNAL MEDICINE

## 2024-07-24 PROCEDURE — 93016 CV STRESS TEST SUPVJ ONLY: CPT | Performed by: INTERNAL MEDICINE

## 2024-07-24 PROCEDURE — 78452 HT MUSCLE IMAGE SPECT MULT: CPT

## 2024-07-24 PROCEDURE — 93018 CV STRESS TEST I&R ONLY: CPT | Performed by: INTERNAL MEDICINE

## 2024-07-24 PROCEDURE — A9502 TC99M TETROFOSMIN: HCPCS | Performed by: INTERNAL MEDICINE

## 2024-07-24 PROCEDURE — 2500000004 HC RX 250 GENERAL PHARMACY W/ HCPCS (ALT 636 FOR OP/ED): Performed by: INTERNAL MEDICINE

## 2024-07-24 PROCEDURE — 93017 CV STRESS TEST TRACING ONLY: CPT

## 2024-07-24 RX ORDER — REGADENOSON 0.08 MG/ML
0.4 INJECTION, SOLUTION INTRAVENOUS ONCE
Status: COMPLETED | OUTPATIENT
Start: 2024-07-24 | End: 2024-07-24

## 2024-07-25 ENCOUNTER — TELEPHONE (OUTPATIENT)
Dept: CARDIOLOGY | Facility: CLINIC | Age: 74
End: 2024-07-25
Payer: MEDICARE

## 2024-07-29 ENCOUNTER — OFFICE VISIT (OUTPATIENT)
Dept: CARDIOLOGY | Facility: HOSPITAL | Age: 74
End: 2024-07-29
Payer: MEDICARE

## 2024-07-29 VITALS
BODY MASS INDEX: 24.77 KG/M2 | SYSTOLIC BLOOD PRESSURE: 143 MMHG | OXYGEN SATURATION: 95 % | HEART RATE: 70 BPM | WEIGHT: 153.44 LBS | DIASTOLIC BLOOD PRESSURE: 61 MMHG

## 2024-07-29 DIAGNOSIS — R06.09 DYSPNEA ON EFFORT: Primary | ICD-10-CM

## 2024-07-29 DIAGNOSIS — R07.89 CHEST PRESSURE: ICD-10-CM

## 2024-07-29 DIAGNOSIS — I49.3 FREQUENT PVCS: ICD-10-CM

## 2024-07-29 PROCEDURE — 99214 OFFICE O/P EST MOD 30 MIN: CPT | Performed by: STUDENT IN AN ORGANIZED HEALTH CARE EDUCATION/TRAINING PROGRAM

## 2024-07-29 PROCEDURE — 1159F MED LIST DOCD IN RCRD: CPT | Performed by: STUDENT IN AN ORGANIZED HEALTH CARE EDUCATION/TRAINING PROGRAM

## 2024-07-29 PROCEDURE — 3078F DIAST BP <80 MM HG: CPT | Performed by: STUDENT IN AN ORGANIZED HEALTH CARE EDUCATION/TRAINING PROGRAM

## 2024-07-29 PROCEDURE — 3077F SYST BP >= 140 MM HG: CPT | Performed by: STUDENT IN AN ORGANIZED HEALTH CARE EDUCATION/TRAINING PROGRAM

## 2024-07-29 PROCEDURE — 1123F ACP DISCUSS/DSCN MKR DOCD: CPT | Performed by: STUDENT IN AN ORGANIZED HEALTH CARE EDUCATION/TRAINING PROGRAM

## 2024-07-29 NOTE — PROGRESS NOTES
Referred by Dr. Ovalles for No chief complaint on file.     History Of Present Illness:    Tre Sumner is a 73 y.o. male with history of CAD s/p PCI, cardiomyopathy (EF 40-45%), LBBB, frequent PVCs (19% burden), referred to EP. Patient reports chest pressure and SOB with exertion. ECG showing sinus rhythm, LBBB, PVCs, HR 59 bpm.      Past Medical History:  He has a past medical history of Abnormal electrocardiogram (ECG) (EKG), Encounter for screening for malignant neoplasm of prostate (02/16/2021), Other conditions influencing health status (01/05/2022), and Personal history of other diseases of the musculoskeletal system and connective tissue.    Past Surgical History:  He has a past surgical history that includes Other surgical history (12/01/2016); Shoulder surgery (12/01/2016); Other surgical history (12/01/2016); and Hemorrhoid surgery (12/01/2016).      Social History:  He reports that he quit smoking about 50 years ago. His smoking use included cigarettes. He has never used smokeless tobacco. He reports that he does not drink alcohol and does not use drugs.    Family History:  No family history on file.     Allergies:  Aspirin, Penicillins, and Statins-hmg-coa reductase inhibitors    Outpatient Medications:  Current Outpatient Medications   Medication Instructions    atorvastatin (LIPITOR) 40 mg, oral, Nightly    carvedilol (COREG) 12.5 mg, oral, 2 times daily after meals    clopidogrel (PLAVIX) 75 mg, oral, Daily    cyanocobalamin, vitamin B-12, 1,000 mcg tablet, sublingual DISSOLVE 1 TABLET UNDER THE TONGUE ONCE A DAY    fexofenadine (Allegra) 180 mg tablet 1 tablet, oral, Daily    furosemide (LASIX) 20 mg, oral, Daily    Jardiance 25 mg, oral, Daily    losartan-hydrochlorothiazide (Hyzaar) 100-12.5 mg tablet 1 tablet, oral, Daily    pedi multivit no.17 w-fluoride (Poly-Vi-Liane) 0.5 mg tablet,chewable 1 tablet, oral, Daily    potassium chloride CR (Klor-Con M20) 20 mEq ER tablet 20 mEq, oral, Daily, Do  not crush or chew.        Last Recorded Vitals:  Vitals:    07/29/24 1521   BP: 143/61   Pulse: 70   SpO2: 95%   Weight: 69.6 kg (153 lb 7 oz)     Review of Systems   Constitutional:  Negative for fever.   Respiratory:  Negative for shortness of breath.    Cardiovascular:  Positive for chest pain. Negative for palpitations and leg swelling.        VARELA. As per history.   Neurological:  Negative for dizziness and syncope.   Psychiatric/Behavioral:  Negative for confusion.       Physical Exam  Constitutional:       Appearance: Normal appearance.   Cardiovascular:      Rate and Rhythm: Normal rate and regular rhythm. Extrasystoles are present.     Heart sounds: No murmur heard.     No friction rub. No gallop.   Pulmonary:      Effort: Pulmonary effort is normal.      Breath sounds: Normal breath sounds.   Abdominal:      Palpations: Abdomen is soft.   Musculoskeletal:      Cervical back: Neck supple.   Neurological:      Mental Status: He is alert.   Psychiatric:         Mood and Affect: Mood normal.         Behavior: Behavior normal.             Last Labs:  CBC -  Lab Results   Component Value Date    WBC 5.2 12/05/2023    HGB 15.8 12/05/2023    HCT 49.8 12/05/2023    MCV 92 12/05/2023     12/05/2023       CMP -  Lab Results   Component Value Date    CALCIUM 9.3 06/25/2024    PROT 6.7 07/18/2024    PROT 6.5 12/05/2023    ALBUMIN 4.2 12/05/2023    AST 27 12/05/2023    ALT 26 12/05/2023    ALKPHOS 112 12/05/2023    BILITOT 1.8 (H) 12/05/2023       LIPID PANEL -   Lab Results   Component Value Date    CHOL 132 12/05/2023    TRIG 110 12/05/2023    HDL 43.4 12/05/2023    CHHDL 3.0 12/05/2023    LDLF 67 02/01/2023    VLDL 22 12/05/2023    NHDL 89 12/05/2023       RENAL FUNCTION PANEL -   Lab Results   Component Value Date    GLUCOSE 239 (H) 06/25/2024     06/25/2024    K 4.5 06/25/2024     06/25/2024    CO2 27 06/25/2024    ANIONGAP 12 06/25/2024    BUN 17 06/25/2024    CREATININE 0.83 06/25/2024     GFRMALE 76 12/05/2022    CALCIUM 9.3 06/25/2024    ALBUMIN 4.2 12/05/2023        Lab Results   Component Value Date     (H) 06/25/2024    HGBA1C 7.1 (A) 04/18/2024       Last Cardiology Tests:    Echo:  Transthoracic Echo (TTE) Complete 07/11/2024    PHYSICIAN INTERPRETATION:  Left Ventricle: The left ventricular systolic function is mildly decreased, with a visually estimated ejection fraction of 40-45%. There is global hypokinesis of the left ventricle with minor regional variations. The left ventricular cavity size is mildly dilated. The left ventricular septal wall thickness is moderately increased. There is mildly increased left ventricular posterior wall thickness. Left Ventricular Global Longitudinal Strain - 8.4 %. Spectral Doppler shows an impaired relaxation pattern of left ventricular diastolic filling. Reduced LV strain at -8.4% with apical sparing pattern which can be seen in restrictive cardiomyopathies such as cardiac amyloidosis.  Left Atrium: The left atrium is normal in size.  Right Ventricle: The right ventricle is normal in size. There is normal right ventricular global systolic function.  Right Atrium: The right atrium is normal in size.  Aortic Valve: The aortic valve is probably trileaflet. There is mild aortic valve thickening. The aortic valve dimensionless index is 0.93. There is mild aortic valve regurgitation. The peak instantaneous gradient of the aortic valve is 11.5 mmHg. The mean gradient of the aortic valve is 6.0 mmHg.  Mitral Valve: The mitral valve is mildly thickened. There is mild mitral annular calcification. There is mild to moderate mitral valve regurgitation.  Tricuspid Valve: The tricuspid valve is structurally normal. There is mild tricuspid regurgitation. The Doppler estimated RVSP is slightly elevated at 34.9 mmHg.  Pulmonic Valve: The pulmonic valve is structurally normal. There is physiologic pulmonic valve regurgitation.  Pericardium: There is no pericardial  effusion noted.  Aorta: The aortic root is normal.        CONCLUSIONS:   1. The left ventricular systolic function is mildly decreased, with a visually estimated ejection fraction of 40-45%.   2. There is global hypokinesis of the left ventricle with minor regional variations.   3. Spectral Doppler shows an impaired relaxation pattern of left ventricular diastolic filling.   4. Left ventricular cavity size is mildly dilated.   5. Moderately increased left ventricular septal thickness.   6. Reduced LV strain at -8.4% with apical sparing pattern which can be seen in restrictive cardiomyopathies such as cardiac amyloidosis.   7. There is normal right ventricular global systolic function.   8. Mild to moderate mitral valve regurgitation.   9. Slightly elevated RVSP.  10. Mild aortic valve regurgitation.    Stress Test:  Nuclear Stress Test 07/24/2024    IMPRESSION:  1. Normal stress myocardial perfusion imaging in response to  pharmacologic stress.  2. Mildly dilated LV size. Gating could not be performed due to  frequent ectopy.        Diagnostic review: I have personally reviewed the result(s)     Assessment/Plan   Diagnoses and all orders for this visit:  Dyspnea on effort  Frequent PVCs  -     Referral to Cardiac Electrophysiology  Chest pressure  -     Referral to Cardiac Electrophysiology    History of CAD s/p PCI, cardiomyopathy (EF 40-45%), LBBB, frequent PVCs (19% burden), referred to EP. Patient reports chest pressure and SOB with exertion. His recent stress test was negative for ischemia. However, considering his history and clinical findings, I will order a LHC. Will schedule a follow up.      Akash Santos MD

## 2024-07-30 DIAGNOSIS — I25.5 ISCHEMIC CARDIOMYOPATHY: ICD-10-CM

## 2024-07-30 ASSESSMENT — ENCOUNTER SYMPTOMS
FEVER: 0
PALPITATIONS: 0
SHORTNESS OF BREATH: 0
CONFUSION: 0
DIZZINESS: 0

## 2024-08-02 ENCOUNTER — HOSPITAL ENCOUNTER (OUTPATIENT)
Dept: RADIOLOGY | Facility: HOSPITAL | Age: 74
Discharge: HOME | End: 2024-08-02
Payer: MEDICARE

## 2024-08-02 ENCOUNTER — LAB (OUTPATIENT)
Dept: LAB | Facility: LAB | Age: 74
End: 2024-08-02
Payer: MEDICARE

## 2024-08-02 DIAGNOSIS — R94.31 ABNORMAL ELECTROCARDIOGRAM (ECG) (EKG): ICD-10-CM

## 2024-08-02 DIAGNOSIS — I50.20 HFREF (HEART FAILURE WITH REDUCED EJECTION FRACTION) (MULTI): ICD-10-CM

## 2024-08-02 DIAGNOSIS — I25.5 ISCHEMIC CARDIOMYOPATHY: ICD-10-CM

## 2024-08-02 LAB
ANION GAP SERPL CALC-SCNC: 12 MMOL/L (ref 10–20)
BUN SERPL-MCNC: 20 MG/DL (ref 6–23)
CALCIUM SERPL-MCNC: 9.6 MG/DL (ref 8.6–10.3)
CHLORIDE SERPL-SCNC: 106 MMOL/L (ref 98–107)
CO2 SERPL-SCNC: 25 MMOL/L (ref 21–32)
CREAT SERPL-MCNC: 0.95 MG/DL (ref 0.5–1.3)
EGFRCR SERPLBLD CKD-EPI 2021: 83 ML/MIN/1.73M*2
ERYTHROCYTE [DISTWIDTH] IN BLOOD BY AUTOMATED COUNT: 14 % (ref 11.5–14.5)
GLUCOSE SERPL-MCNC: 120 MG/DL (ref 74–99)
HCT VFR BLD AUTO: 44.3 % (ref 41–52)
HGB BLD-MCNC: 14.6 G/DL (ref 13.5–17.5)
MCH RBC QN AUTO: 29.6 PG (ref 26–34)
MCHC RBC AUTO-ENTMCNC: 33 G/DL (ref 32–36)
MCV RBC AUTO: 90 FL (ref 80–100)
NRBC BLD-RTO: 0 /100 WBCS (ref 0–0)
PLATELET # BLD AUTO: 228 X10*3/UL (ref 150–450)
POTASSIUM SERPL-SCNC: 4.1 MMOL/L (ref 3.5–5.3)
RBC # BLD AUTO: 4.93 X10*6/UL (ref 4.5–5.9)
SODIUM SERPL-SCNC: 139 MMOL/L (ref 136–145)
WBC # BLD AUTO: 5.5 X10*3/UL (ref 4.4–11.3)

## 2024-08-02 PROCEDURE — 36415 COLL VENOUS BLD VENIPUNCTURE: CPT

## 2024-08-02 PROCEDURE — 3430000001 HC RX 343 DIAGNOSTIC RADIOPHARMACEUTICALS: Performed by: INTERNAL MEDICINE

## 2024-08-02 PROCEDURE — A9538 TC99M PYROPHOSPHATE: HCPCS | Performed by: INTERNAL MEDICINE

## 2024-08-02 PROCEDURE — 80048 BASIC METABOLIC PNL TOTAL CA: CPT

## 2024-08-02 PROCEDURE — 85027 COMPLETE CBC AUTOMATED: CPT

## 2024-08-02 PROCEDURE — 78469 HEART INFARCT IMAGE (3D): CPT

## 2024-08-05 ENCOUNTER — TELEPHONE (OUTPATIENT)
Dept: CARDIOLOGY | Facility: HOSPITAL | Age: 74
End: 2024-08-05
Payer: MEDICARE

## 2024-08-05 DIAGNOSIS — E85.82 WILD-TYPE TRANSTHYRETIN-RELATED (ATTR) AMYLOIDOSIS (MULTI): Primary | ICD-10-CM

## 2024-08-05 NOTE — TELEPHONE ENCOUNTER
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    Called and informed patient of results of PYP study and plan for referral for genetic testing and advanced heart failure clinic; patient verbalized understanding and was agreeable; also reported he was having heart cath on 8/16/24 at [facility].    Instructions for heart cath given   to include  NPO after midnight x for plavix and carvedilol  To have a ride due to sedation  To shower the night before and wear loose comfortable clothes  To bring an overnight bag in case of overnight stay  To bring an ID card, insurance card and list of medications.  Patient has no allergy to IV dye  Patient has a stable serum creatinine  Patient has recent labs and EKG    Patient verbalized understanding of instructions.    Will send  My Chart message as well to patient to review instructions.    Referrals to cardiac genetics and advanced heart failure. Will task for scheduling.        ----- Message from Albaro Ovalles sent at 8/2/2024  5:44 PM EDT -----  Cardiac amyloid scan suggestive of TTR amyloidosis.  Please refer patient for genetic testing.  Please refer patient to advanced heart failure.

## 2024-08-16 ENCOUNTER — HOSPITAL ENCOUNTER (OUTPATIENT)
Facility: HOSPITAL | Age: 74
Setting detail: OUTPATIENT SURGERY
Discharge: HOME | End: 2024-08-16
Attending: INTERNAL MEDICINE | Admitting: INTERNAL MEDICINE
Payer: MEDICARE

## 2024-08-16 VITALS — DIASTOLIC BLOOD PRESSURE: 54 MMHG | SYSTOLIC BLOOD PRESSURE: 93 MMHG | HEART RATE: 59 BPM | RESPIRATION RATE: 15 BRPM

## 2024-08-16 DIAGNOSIS — E11.69 TYPE 2 DIABETES MELLITUS WITH OTHER SPECIFIED COMPLICATION, UNSPECIFIED WHETHER LONG TERM INSULIN USE (MULTI): ICD-10-CM

## 2024-08-16 DIAGNOSIS — I20.9 ANGINA PECTORIS, UNSPECIFIED (CMS-HCC): ICD-10-CM

## 2024-08-16 DIAGNOSIS — I49.3 VENTRICULAR PREMATURE DEPOLARIZATION: ICD-10-CM

## 2024-08-16 LAB — ACT BLD: NORMAL S

## 2024-08-16 PROCEDURE — 93458 L HRT ARTERY/VENTRICLE ANGIO: CPT | Performed by: INTERNAL MEDICINE

## 2024-08-16 PROCEDURE — C1894 INTRO/SHEATH, NON-LASER: HCPCS | Performed by: INTERNAL MEDICINE

## 2024-08-16 PROCEDURE — 2720000007 HC OR 272 NO HCPCS: Performed by: INTERNAL MEDICINE

## 2024-08-16 PROCEDURE — 99152 MOD SED SAME PHYS/QHP 5/>YRS: CPT | Performed by: INTERNAL MEDICINE

## 2024-08-16 PROCEDURE — 99153 MOD SED SAME PHYS/QHP EA: CPT | Performed by: INTERNAL MEDICINE

## 2024-08-16 PROCEDURE — 93571 IV DOP VEL&/PRESS C FLO 1ST: CPT | Performed by: INTERNAL MEDICINE

## 2024-08-16 PROCEDURE — 2500000004 HC RX 250 GENERAL PHARMACY W/ HCPCS (ALT 636 FOR OP/ED): Performed by: INTERNAL MEDICINE

## 2024-08-16 PROCEDURE — 85347 COAGULATION TIME ACTIVATED: CPT | Performed by: INTERNAL MEDICINE

## 2024-08-16 PROCEDURE — 2550000001 HC RX 255 CONTRASTS: Performed by: INTERNAL MEDICINE

## 2024-08-16 PROCEDURE — 2500000005 HC RX 250 GENERAL PHARMACY W/O HCPCS: Performed by: INTERNAL MEDICINE

## 2024-08-16 PROCEDURE — 85347 COAGULATION TIME ACTIVATED: CPT

## 2024-08-16 PROCEDURE — 96373 THER/PROPH/DIAG INJ IA: CPT | Performed by: INTERNAL MEDICINE

## 2024-08-16 PROCEDURE — C1887 CATHETER, GUIDING: HCPCS | Performed by: INTERNAL MEDICINE

## 2024-08-16 PROCEDURE — C1760 CLOSURE DEV, VASC: HCPCS | Performed by: INTERNAL MEDICINE

## 2024-08-16 PROCEDURE — 7100000010 HC PHASE TWO TIME - EACH INCREMENTAL 1 MINUTE: Performed by: INTERNAL MEDICINE

## 2024-08-16 PROCEDURE — 7100000009 HC PHASE TWO TIME - INITIAL BASE CHARGE: Performed by: INTERNAL MEDICINE

## 2024-08-16 PROCEDURE — C1769 GUIDE WIRE: HCPCS | Performed by: INTERNAL MEDICINE

## 2024-08-16 RX ORDER — EMPAGLIFLOZIN 25 MG/1
12.5 TABLET, FILM COATED ORAL DAILY
Start: 2024-08-16 | End: 2025-08-16

## 2024-08-16 RX ORDER — FENTANYL CITRATE 50 UG/ML
INJECTION, SOLUTION INTRAMUSCULAR; INTRAVENOUS AS NEEDED
Status: DISCONTINUED | OUTPATIENT
Start: 2024-08-16 | End: 2024-08-16 | Stop reason: HOSPADM

## 2024-08-16 RX ORDER — LIDOCAINE HYDROCHLORIDE 20 MG/ML
INJECTION, SOLUTION INFILTRATION; PERINEURAL AS NEEDED
Status: DISCONTINUED | OUTPATIENT
Start: 2024-08-16 | End: 2024-08-16 | Stop reason: HOSPADM

## 2024-08-16 RX ORDER — SODIUM CHLORIDE 9 MG/ML
INJECTION, SOLUTION INTRAVENOUS CONTINUOUS PRN
Status: DISCONTINUED | OUTPATIENT
Start: 2024-08-16 | End: 2024-08-16 | Stop reason: HOSPADM

## 2024-08-16 RX ORDER — VERAPAMIL HYDROCHLORIDE 2.5 MG/ML
INJECTION, SOLUTION INTRAVENOUS AS NEEDED
Status: DISCONTINUED | OUTPATIENT
Start: 2024-08-16 | End: 2024-08-16 | Stop reason: HOSPADM

## 2024-08-16 RX ORDER — NITROGLYCERIN 40 MG/100ML
INJECTION INTRAVENOUS AS NEEDED
Status: DISCONTINUED | OUTPATIENT
Start: 2024-08-16 | End: 2024-08-16 | Stop reason: HOSPADM

## 2024-08-16 RX ORDER — HEPARIN SODIUM 1000 [USP'U]/ML
INJECTION, SOLUTION INTRAVENOUS; SUBCUTANEOUS AS NEEDED
Status: DISCONTINUED | OUTPATIENT
Start: 2024-08-16 | End: 2024-08-16 | Stop reason: HOSPADM

## 2024-08-16 RX ORDER — MIDAZOLAM HYDROCHLORIDE 1 MG/ML
INJECTION, SOLUTION INTRAMUSCULAR; INTRAVENOUS AS NEEDED
Status: DISCONTINUED | OUTPATIENT
Start: 2024-08-16 | End: 2024-08-16 | Stop reason: HOSPADM

## 2024-08-16 ASSESSMENT — COLUMBIA-SUICIDE SEVERITY RATING SCALE - C-SSRS
1. IN THE PAST MONTH, HAVE YOU WISHED YOU WERE DEAD OR WISHED YOU COULD GO TO SLEEP AND NOT WAKE UP?: NO
6. HAVE YOU EVER DONE ANYTHING, STARTED TO DO ANYTHING, OR PREPARED TO DO ANYTHING TO END YOUR LIFE?: NO
2. HAVE YOU ACTUALLY HAD ANY THOUGHTS OF KILLING YOURSELF?: NO

## 2024-08-16 NOTE — SIGNIFICANT EVENT
Discharge instructions reviewed with patient. All questions addressed. Band removed and support board placed on right arm.

## 2024-08-19 NOTE — SIGNIFICANT EVENT
Cath Lab Procedure Call Back  Procedure Date: __8/16/24_____________   Procedure Performed:__________LHC__________  Physician:_____Stefano__________  Spoke with:_________Grover____________________  Date/Time Contacted: 1st attempt: __8/19/24 at 1044_______ ___:____ 2nd attempt: _________ ___:____  Phone#:_______________ Unable to reach/Left message:____________  Access Site: Pain______Bleeding______Bruised______Infection______WNL___xx___  Dressing Removal Date:_____Sat_________ Anticoagulation Post Intervention_________  Satisfied with Care:_______yes_________ D/C Instructions adequate___yes____________  Follow Up Appointment:_______yes______________ Length of Call:___2 min______________  Comments:______________________________________________________________________________________________________________________________________________

## 2024-08-28 ENCOUNTER — APPOINTMENT (OUTPATIENT)
Dept: CARDIOLOGY | Facility: CLINIC | Age: 74
End: 2024-08-28
Payer: MEDICARE

## 2024-09-12 DIAGNOSIS — E11.69 TYPE 2 DIABETES MELLITUS WITH OTHER SPECIFIED COMPLICATION, UNSPECIFIED WHETHER LONG TERM INSULIN USE (MULTI): ICD-10-CM

## 2024-09-12 RX ORDER — EMPAGLIFLOZIN 25 MG/1
25 TABLET, FILM COATED ORAL DAILY
Qty: 90 TABLET | Refills: 3 | Status: SHIPPED | OUTPATIENT
Start: 2024-09-12 | End: 2025-09-12

## 2024-09-26 PROBLEM — R42 LIGHTHEADEDNESS: Status: ACTIVE | Noted: 2024-09-26

## 2024-09-26 PROBLEM — I49.3 FREQUENT PVCS: Status: ACTIVE | Noted: 2024-09-26

## 2024-09-26 PROBLEM — I25.10 ATHEROSCLEROTIC HEART DISEASE OF NATIVE CORONARY ARTERY WITHOUT ANGINA PECTORIS: Status: RESOLVED | Noted: 2023-04-18 | Resolved: 2024-09-26

## 2024-09-26 PROBLEM — R53.83 TIREDNESS: Status: ACTIVE | Noted: 2024-09-26

## 2024-09-26 NOTE — PROGRESS NOTES
[facility]    Patient Name: Tre Sumner  Patient : 1950      Scribe Attestation  By signing my name below, I, Danilo Schaffer   attest that this documentation has been prepared under the direction and in the presence of Albaro Ovalles DO.      Reason for visit:  This is a 73-year-old male here for follow-up regarding coronary artery disease status post PCI of his RCA done in , HFrEF with an ejection fraction of 40%, frequent PVCs, mild aortic valve regurgitation, hypertension, dyslipidemia, tiredness, and lightheadedness.     HPI:  This is a 73-year-old male here for follow-up regarding coronary artery disease status post PCI of his RCA done in , HFrEF with an ejection fraction of 40%, frequent PVCs, mild aortic valve regurgitation, hypertension, dyslipidemia, tiredness, and lightheadedness.  Patient was last evaluated by me in 2024.  At that visit I asked him to restart carvedilol 12.5 mg twice daily as the patient initially reported he was only taking once daily, ordered blood work including BMP/BNP/magnesium/TSH, ordered additional blood work including CMP/lipid/magnesium/CBC/BNP to be drawn in 3 months, ordered an echocardiogram, ordered a Holter monitor, and asked the patient to follow-up in 3 months and sooner if very.  Blood work done in  showed normal serum sodium and potassium with a serum creatinine 0.83, serum magnesium was 1.91, BNP mildly elevated at 380.  I subsequently asked the patient to start furosemide 20 mg daily and KCl 20 mill equivalents daily for 5 days then stop.  Holter monitor done in 2024 showed frequent ventricular ectopy with a burden of 19.9%, 19 episodes of NSVT the longest lasting 4 beats.  The patient was referred to EP cardiology and a Lexiscan nuclear stress was ordered.  Echocardiogram done in 2024 showed mildly reduced left ventricular systolic function with an ejection fraction of 40 to  45%, reduced LV strain at -8.4% with an apical sparing pattern, normal right ventricular systolic function, mild to moderate mitral valve regurgitation, mild aortic valve regurgitation.  The patient was subsequently referred for amyloid testing.  IgG kappa free light chain done in July 2024 was elevated at 3.05 and patient was referred to hematology.  Nuclear stress done in July 2024 showed normal perfusion, gated images could not be performed due to frequent ectopy.  Patient was seen by Dr. Santos on 7/29/2024 at which time he was referred to EP cardiology and left heart catheterization was ordered. 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  BMP done 8/2/2024 showed normal serum sodium and potassium with a serum creatinine 0.95, CBC showed hemoglobin of 14.6.  Cardiac catheterization done 8/16/2024 showed 40% mid LAD stenosis, 50% mid left circumflex stenosis, patent mid vessel stent in the RCA with a 40% distal stenosis of the RCA, FFR of the left circumflex was 0.88, LVEDP was 11, medical therapy was recommended.  Patient appears to have future appointments with advanced heart failure and hematology/oncology in October.  Patient scheduled with genetics in February 2025. ECG done today showed sinus rhythm with a heart rate of 70 bpm, and frequent PVCs.  The patient reports that he has been feeling generally well from the cardiac standpoint. He denies any new chest pain, shortness of breath, palpitations and lightheadedness. He states that he takes all of his medications as prescribed. During my exam, he was resting comfortably on the exam table.                 Assessment/Plan:   1. Coronary artery disease  The patient has a history of coronary artery disease status post PCI of his RCA done in 2010.  Cardiac catheterization done 8/16/2024 showed 40% mid LAD stenosis, 50% mid left circumflex stenosis,  patent mid vessel stent in the RCA with a 40% distal stenosis of the RCA, FFR of the left circumflex was 0.88, LVEDP was 11, medical therapy was recommended.   ECG done today showed sinus rhythm with a heart rate of 70 bpm, and frequent PVCs.      He denies anginal chest discomfort.  Blood pressure appears controlled on exam today.  I will discontinue losartan-hydrochlorothiazide and start him on Entresto 100 mg twice daily.  He should continue his other antihypertensive medications and antiplatelet therapy.  Echocardiogram done in July 2024 showed mildly reduced left ventricular systolic function with an ejection fraction of 40 to 45%, reduced LV strain at -8.4% with an apical sparing pattern, normal right ventricular systolic function, mild to moderate mitral valve regurgitation, mild aortic valve regurgitation.   Recent lab works as noted in the HPI.   Lipid panel done in December 2023 showed an LDL of 67 and triglycerides of 110 on atorvastatin 40 mg daily.   Lab works as noted below will be done in 6 months prior to his next visit.   Please see lifestyle recommendations below.  Follow up in 6 months and sooner if necessary.      2. HFrEF  The patient has a history of HFrEF with an ejection fraction of 40%.  Echocardiogram done in July 2024 showed mildly reduced left ventricular systolic function with an ejection fraction of 40 to 45%, reduced LV strain at -8.4% with an apical sparing pattern, normal right ventricular systolic function, mild to moderate mitral valve regurgitation, mild aortic valve regurgitation.   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   He does not appear significantly volume overloaded on exam today except for trace pitting edema.  I will discontinue losartan-hydrochlorothiazide and start him on Entresto 100 mg twice daily.  He should continue his other cardiac medications.  Recent lab works as noted in the HPI.   Lab works as noted below will be done in 6  months prior to his next visit.    Patient appears to have future appointments with advanced heart failure and hematology/oncology in October.    Patient scheduled with Genetics in February 2025.  Cardiac MRI was ordered as noted below.  I discussed with him the importance of following a low-sodium heart healthy diet.  Follow up in 6 months and sooner if necessary.      3. Frequent PVCs  ECG done today showed sinus rhythm with a heart rate of 70 bpm, and frequent PVCs.    He denies chest pain, palpitations or lightheadedness.   He should continue carvedilol for heart rate control.   Holter monitor done in July 2024 showed frequent ventricular ectopy with a burden of 19.9%, 19 episodes of NSVT the longest lasting 4 beats.    Echocardiogram as noted above.  Recent lab works as noted in the HPI.  Lab works as noted below will be done in 6 months prior to his next visit.   Patient should follow general recommendations including avoiding excessive alcohol intake, avoiding excessive caffeine intake, staying well-hydrated, getting an appropriate amount of sleep.     4. Aortic valve regurgitation  The patient has a history of aortic valve regurgitation.  Echocardiogram done in July 2024 showed mildly reduced left ventricular systolic function with an ejection fraction of 40 to 45%, reduced LV strain at -8.4% with an apical sparing pattern, normal right ventricular systolic function, mild to moderate mitral valve regurgitation, mild aortic valve regurgitation.   I will continue to monitor this clinically.     5. Hypertension  The patient has a history of hypertension which appears controlled on exam today.  I will discontinue losartan-hydrochlorothiazide and start him on Entresto 100 mg twice daily.  He should continue his other antihypertensive medications and monitor his blood pressure at home.      6. Dyslipidemia  Lipid panel done in December 2023 showed an LDL of 67 and triglycerides of 110 on atorvastatin 40 mg daily.    Lipid panel will be updated in 6 months.  Please see lifestyle recommendations below.      7. Tiredness  The previously reported tiredness had been stable.  Patient should continue to follow with his PCP.     8. Lightheadedness  The previously reported lightheadedness had improved.  ECG done today showed sinus rhythm with a heart rate of 70 bpm, and frequent PVCs.     Blood  pressure appears controlled on exam today.  Recent lab works as noted in the HPI.  Lab works as noted below will be done in 6 months prior to his next visit.   Patient should follow general recommendations including staying well-hydrated, changing the positions slowly, wearing compression stockings as necessary, and routine exercise.         Orders:  Keep appointment with advanced heart failure  Keep appointment with hematology  Patient needs to follow-up with the EP cardiology.  Discontinue losartan-hydrochlorothiazide and Start Entresto 100 mg twice daily.  Cardiac MRI  CMP/lipid/magnesium/CBC in 6 months,   Follow-up in 6 months.         Lifestyle Recommendations  I recommend a whole-food plant-based diet, an eating pattern that encourages the consumption of unrefined plant foods (such as fruits, vegetables, tubers, whole grains, legumes, nuts and seeds) and discourages meats, dairy products, eggs and processed foods.     The AHA/ACC recommends that the patient consume a dietary pattern that emphasizes intake of vegetables, fruits, and whole grains; includes low-fat dairy products, poultry, fish, legumes, non-tropical vegetable oils, and nuts; and limits intake of sodium, sweets, sugar-sweetened beverages, and red meats.  Adapt this dietary pattern to appropriate calorie requirements (a 500-750 kcal/day deficit to loose weight), personal and cultural food preferences, and nutrition therapy for other medical conditions (including diabetes).  Achieve this pattern by following plans such as the [place] Mediterranean, DASH dietary pattern, or AHA  diet.     Engage in 2 hours and 30 minutes per week of moderate-intensity physical activity, or 1 hour and 15 minutes (75 minutes) per week of vigorous-intensity aerobic physical activity, or an equivalent combination of moderate and vigorous-intensity aerobic physical activity. Aerobic activity should be performed in episodes of at least 10 minutes preferably spread throughout the week.     Adhering to a heart healthy diet, regular exercise habits, avoidance of tobacco products, and maintenance of a healthy weight are crucial components of their heart disease risk reduction.     Any positive review of systems not specifically addressed in the office visit today should be evaluated and treated by the patients primary care physician or in an emergency department if necessary     Patient was notified that results from ordered tests will be called to the patient if it changes current management; it will otherwise be discussed at a future appointment and available on  [facility].     Thank you for allowing me to participate in the care of this patient.        This document was generated using the assistance of voice recognition software. If there are any errors of spelling, grammar, syntax, or meaning; please feel free to contact me directly for clarification.    Past Medical History:  He has a past medical history of Abnormal electrocardiogram (ECG) (EKG), Encounter for screening for malignant neoplasm of prostate (02/16/2021), Other conditions influencing health status (01/05/2022), and Personal history of other diseases of the musculoskeletal system and connective tissue.    Past Surgical History:  He has a past surgical history that includes Other surgical history (12/01/2016); Shoulder surgery (12/01/2016); Other surgical history (12/01/2016); Hemorrhoid surgery (12/01/2016); Cardiac catheterization (N/A, 8/16/2024); and Cardiac catheterization (N/A, 8/16/2024).      Social History:  He reports that he quit smoking  about 50 years ago. His smoking use included cigarettes. He has never used smokeless tobacco. He reports that he does not drink alcohol and does not use drugs.    Family History:  No family history on file.     Allergies:  Aspirin, Penicillins, and Statins-hmg-coa reductase inhibitors    Outpatient Medications:  Current Outpatient Medications   Medication Instructions    atorvastatin (LIPITOR) 40 mg, oral, Nightly    carvedilol (COREG) 12.5 mg, oral, 2 times daily after meals    clopidogrel (PLAVIX) 75 mg, oral, Daily    cyanocobalamin, vitamin B-12, 1,000 mcg tablet, sublingual DISSOLVE 1 TABLET UNDER THE TONGUE ONCE A DAY    fexofenadine (Allegra) 180 mg tablet 1 tablet, oral, Daily    Jardiance 25 mg, oral, Daily    losartan-hydrochlorothiazide (Hyzaar) 100-12.5 mg tablet 1 tablet, oral, Daily    pedi multivit no.17 w-fluoride (Poly-Vi-Liane) 0.5 mg tablet,chewable 1 tablet, oral, Daily        ROS:  A 14 point review of systems was done and is negative other than as stated in HPI    Vitals:      8/16/2024     4:15 PM 8/16/2024     4:30 PM 8/16/2024     4:45 PM 8/16/2024     5:00 PM 8/16/2024     5:15 PM 8/16/2024     5:30 PM 8/16/2024     5:45 PM   Vitals   Systolic 94 105 117 102 102 93 93   Diastolic 59 85 68 53 53 54 54   Heart Rate 61 59 62 62 63 59 59   Resp 15 13 13 17 15 18 15        Physical Exam:   Constitutional: Cooperative, in no acute distress, alert, appears stated age.   Skin: Skin color, texture, turgor normal. No rashes or lesions.   Head: Normocephalic. No masses, lesions, tenderness or abnormalities   Eyes: Extraocular movements are grossly intact.   Mouth and throat: Mucous membranes moist   Neck: Neck supple, no carotid bruits, no JVD   Respiratory: Lungs clear to auscultation, no wheezing or rhonchi, no use of accessory muscles   Chest wall: No scars, normal excursion with respiration   Cardiovascular: Regular rhythm, + murmur  Gastrointestinal: Abdomen soft, nontender. Bowel sounds normal.    Musculoskeletal: Strength equal in upper extremities   Extremities: Trace pitting edema  Neurologic: Sensation grossly intact, alert and oriented x3    Intake/Output:   No intake/output data recorded.    Outpatient Medications  Current Outpatient Medications on File Prior to Visit   Medication Sig Dispense Refill    atorvastatin (Lipitor) 40 mg tablet TAKE 1 TABLET BY MOUTH AT BEDTIME 90 tablet 3    carvedilol (Coreg) 12.5 mg tablet Take 1 tablet (12.5 mg) by mouth 2 times a day after meals. 180 tablet 3    clopidogrel (Plavix) 75 mg tablet Take 1 tablet (75 mg) by mouth once daily. 90 tablet 3    cyanocobalamin, vitamin B-12, 1,000 mcg tablet, sublingual DISSOLVE 1 TABLET UNDER THE TONGUE ONCE A DAY      fexofenadine (Allegra) 180 mg tablet Take 1 tablet (180 mg) by mouth once daily.      Jardiance 25 mg Take 1 tablet (25 mg) by mouth once daily. 90 tablet 3    losartan-hydrochlorothiazide (Hyzaar) 100-12.5 mg tablet TAKE ONE TABLET BY MOUTH DAILY 90 tablet 3    pedi multivit no.17 w-fluoride (Poly-Vi-Liane) 0.5 mg tablet,chewable Chew 1 tablet once daily.       No current facility-administered medications on file prior to visit.       Labs: (past 26 weeks)  Recent Results (from the past 4368 hour(s))   POCT glycosylated hemoglobin (Hb A1C) manually resulted    Collection Time: 04/18/24 11:16 AM   Result Value Ref Range    POC HEMOGLOBIN A1c 7.1 (A) 4.2 - 6.5 %   Holter Or Event Cardiac Monitor    Collection Time: 06/24/24 11:59 AM   Result Value Ref Range    BSA 1.81 m2   Basic Metabolic Panel    Collection Time: 06/25/24 11:27 AM   Result Value Ref Range    Glucose 239 (H) 74 - 99 mg/dL    Sodium 137 136 - 145 mmol/L    Potassium 4.5 3.5 - 5.3 mmol/L    Chloride 103 98 - 107 mmol/L    Bicarbonate 27 21 - 32 mmol/L    Anion Gap 12 10 - 20 mmol/L    Urea Nitrogen 17 6 - 23 mg/dL    Creatinine 0.83 0.50 - 1.30 mg/dL    eGFR >90 >60 mL/min/1.73m*2    Calcium 9.3 8.6 - 10.3 mg/dL   B-Type Natriuretic Peptide     Collection Time: 06/25/24 11:27 AM   Result Value Ref Range     (H) 0 - 99 pg/mL   Magnesium    Collection Time: 06/25/24 11:27 AM   Result Value Ref Range    Magnesium 1.91 1.60 - 2.40 mg/dL   TSH with reflex to Free T4 if abnormal    Collection Time: 06/25/24 11:27 AM   Result Value Ref Range    Thyroid Stimulating Hormone 2.38 0.44 - 3.98 mIU/L   Transthoracic Echo (TTE) Complete    Collection Time: 07/11/24  1:26 PM   Result Value Ref Range    LVOT diam 2.04 cm    LV Biplane EF 58 %    MV E/A ratio 0.57     MV avg E/e' ratio 10.93     Tricuspid annular plane systolic excursion 2.2 cm    AV mn grad 6.0 mmHg    LA vol index A/L 34.8 ml/m2    AV pk carlos 1.69 m/s    LV EF 43 %    RV free wall pk S' 10.76 cm/s    LV GLS 8.4 %    RVSP 34.9 mmHg    LVIDd 5.72 cm    Aortic Valve Area by Continuity of VTI 3.06 cm2    Aortic Valve Area by Continuity of Peak Velocity 2.59 cm2    AV pk grad 11.5 mmHg    LV A4C EF 56.9    [place]/Lambda Free Light Chain, Serum    Collection Time: 07/18/24 10:49 AM   Result Value Ref Range    Ig Kappa Free Light Chain 3.05 (H) 0.33 - 1.94 mg/dL    Ig Lambda Free Light Chain 1.97 0.57 - 2.63 mg/dL    Kappa/Lambda Ratio 1.55 0.26 - 1.65   Protein, Total    Collection Time: 07/18/24 10:49 AM   Result Value Ref Range    Total Protein 6.7 6.4 - 8.2 g/dL   Serum Protein Electrophoresis    Collection Time: 07/18/24 10:49 AM   Result Value Ref Range    Albumin 4.0 3.4 - 5.0 g/dL    Alpha 1 Globulin 0.3 0.2 - 0.6 g/dL    Alpha 2 Globulin 0.7 0.4 - 1.1 g/dL    Beta Globulin 0.8 0.5 - 1.2 g/dL    Gamma 0.9 0.5 - 1.4 g/dL    Protein Electrophoresis Comment Normal.     Path Review - Serum Protein Electrophoresis        Reviewed and approved by CONSTANCE ESCALANTE on 7/24/24 at 5:22 PM.       Protein, Urine Random    Collection Time: 07/19/24 11:15 AM   Result Value Ref Range    Total Protein, Urine Random <4 (L) 5 - 25 mg/dL   Urine Protein Electrophoresis + Immunofixation    Collection Time: 07/19/24  11:15 AM   Result Value Ref Range    Albumin % 37.2 %    Alpha 1 Globulin % 7.5 %    Alpha 2 Globulin % 10.8 %    Beta Globulin % 24.3 %    Gamma Globulin % 20.2 %    Urine Electrophoresis Comment Normal.        Path Review-Urine Protein Electrophoresis        Reviewed and approved by CONSTANCE ESCALANTE on 7/24/24 at 7:29 PM.        Path Review - Urine Immunofixation       Reviewed and approved by CONSTANCE ESCALANTE on 7/24/24 at 7:29 PM.        Immunofixation Comment No monoclonal protein detected by immunofixation.    Basic metabolic panel    Collection Time: 08/02/24 10:02 AM   Result Value Ref Range    Glucose 120 (H) 74 - 99 mg/dL    Sodium 139 136 - 145 mmol/L    Potassium 4.1 3.5 - 5.3 mmol/L    Chloride 106 98 - 107 mmol/L    Bicarbonate 25 21 - 32 mmol/L    Anion Gap 12 10 - 20 mmol/L    Urea Nitrogen 20 6 - 23 mg/dL    Creatinine 0.95 0.50 - 1.30 mg/dL    eGFR 83 >60 mL/min/1.73m*2    Calcium 9.6 8.6 - 10.3 mg/dL   CBC    Collection Time: 08/02/24 10:02 AM   Result Value Ref Range    WBC 5.5 4.4 - 11.3 x10*3/uL    nRBC 0.0 0.0 - 0.0 /100 WBCs    RBC 4.93 4.50 - 5.90 x10*6/uL    Hemoglobin 14.6 13.5 - 17.5 g/dL    Hematocrit 44.3 41.0 - 52.0 %    MCV 90 80 - 100 fL    MCH 29.6 26.0 - 34.0 pg    MCHC 33.0 32.0 - 36.0 g/dL    RDW 14.0 11.5 - 14.5 %    Platelets 228 150 - 450 x10*3/uL   ACTIVATED CLOTTING TIME LOW    Collection Time: 08/16/24  2:19 PM   Result Value Ref Range    POCT Activated Clotting Time Low Range         ECG  Encounter Date: 06/24/24   ECG 12 Lead    Narrative    Sinus rhythm, left bundle branch block, frequent PVCs, heart rate 58 bpm.       Echocardiogram  No results found for this or any previous visit from the past 1095 days.      CV Studies:  EKG:  Encounter Date: 06/24/24   ECG 12 Lead    Narrative    Sinus rhythm, left bundle branch block, frequent PVCs, heart rate 58 bpm.     Echocardiogram:   Echocardiogram     [address]  [address]  Phone [phone] Fax [phone]    TRANSTHORACIC ECHOCARDIOGRAM REPORT      Patient Name:     MAIKEL HANCOCK        Sandee Physician:  [id number] Davy CONTE MD  Study Date:       12/5/2022       Referring           [id number] TILA FAGAN  Physician:  MRN/PID:          [id number]        PCP:  Accession/Order#: [id number]    Department          [facility]  Location:  YOB: 1950       Fellow:  Gender:           M               Nurse:  Admit Date:       12/5/2022       Sonographer:        Shruti De Oliveira [facility]  Admission Status: Outpatient      Additional Staff:  Height:           165.10 cm       CC Report to:  Weight:           71.22 kg        Study Type:         Echocardiogram  BSA:              1.78 m2  Blood Pressure: 176 /102 mmHg    Diagnosis/ICD: I25.10-Atherosclerotic heart disease of native coronary artery  without angina pectoris; I10-Essential (primary) hypertension;  I35.1-Nonrheumatic aortic (valve) insufficiency  Indication:    CAD, HTN, AI  Procedure/CPT: Echo Complete w Full Doppler-[id number]  Study Detail: The following Echo studies were performed: 2D, M-Mode, Doppler and  color flow.      PHYSICIAN INTERPRETATION:  Left Ventricle: Left ventricular systolic function is mildly decreased, with an estimated ejection fraction of 40-45%. There is global hypokinesis of the left ventricle with minor regional variations. The left ventricular cavity size is normal. The left ventricular septal wall thickness is mildly increased. There is mild to moderate concentric left ventricular hypertrophy. Spectral Doppler shows an impaired relaxation pattern of left ventricular diastolic filling.  Left Atrium: The left atrium is mildly dilated.  Right Ventricle: The right ventricle is normal in size. There is normal right ventricular global systolic function.  Right Atrium: The right atrium is normal in size.  Aortic Valve: The aortic valve is trileaflet. There is  mild aortic valve regurgitation. The mean gradient of the aortic valve is 6.0 mmHg.  Mitral Valve: The mitral valve is normal in structure. There is mild mitral valve regurgitation.  Tricuspid Valve: The tricuspid valve is structurally normal. There is trace tricuspid regurgitation.  Pulmonic Valve: The pulmonic valve is structurally normal. There is trace pulmonic valve regurgitation.  Pericardium: There is no pericardial effusion noted.  Aorta: The aortic root is normal.  Systemic Veins: The inferior vena cava appears to be of normal size. There is IVC inspiratory collapse greater than 50%.      CONCLUSIONS:  1. Left ventricular systolic function is mildly decreased with a 40-45% estimated ejection fraction.  2. Spectral Doppler shows an impaired relaxation pattern of left ventricular diastolic filling.  3. Mild aortic valve regurgitation.  4. There is global hypokinesis of the left ventricle with minor regional variations.    QUANTITATIVE DATA SUMMARY:  2D MEASUREMENTS:  Normal Ranges:  IVSd:          1.50 cm    (0.6-1.1cm)  LVPWd:         1.30 cm    (0.6-1.1cm)  LVIDd:         3.60 cm    (3.9-5.9cm)  LVIDs:         2.60 cm  LV Mass Index: 100.8 g/m2  LV % FS        27.8 %    LA VOLUME:  Normal Ranges:  LA Vol A4C:        42.6 ml    (22+/-6mL/m2)  LA Vol A2C:        53.0 ml  LA Vol BP:         52.8 ml  LA Vol Index A4C:  23.8ml/m2  LA Vol Index A2C:  29.7 ml/m2  LA Vol Index BP:   29.6 ml/m2  LA Area A4C:       14.5 cm2  LA Area A2C:       18.0 cm2  LA Major Axis A4C: 4.2 cm  LA Major Axis A2C: 5.2 cm  LA Volume Index:   27.0 ml/m2  LA Vol A4C:        38.0 ml  LA Vol A2C:        49.0 ml    RA VOLUME BY A/L METHOD:  Normal Ranges:  RA Area A4C: 15.1 cm2    M-MODE MEASUREMENTS:  Normal Ranges:  Ao Root: 3.40 cm (2.0-3.7cm)  AoV Exc: 2.30 cm (1.5-2.5cm)  LAs:     3.20 cm (2.7-4.0cm)    AORTA MEASUREMENTS:  Normal Ranges:  AoV Exc:     2.30 cm (1.5-2.5cm)  Ao Sinus, d: 3.00 cm (2.1-3.5cm)  Ao STJ, d:   2.10 cm  (1.7-3.4cm)  Asc Ao, d:   3.20 cm (2.1-3.4cm)  Ao Arch:     3.00 cm (2.0-3.6cm)    LV SYSTOLIC FUNCTION BY 2D PLANIMETRY (MOD):  Normal Ranges:  EF-A4C View: 45.6 % (>=55%)  EF-A2C View: 44.9 %  EF-Biplane:  44.4 %    LV DIASTOLIC FUNCTION:  Normal Ranges:  MV Peak E:    0.49 m/s    (0.7-1.2 m/s)  MV Peak A:    0.91 m/s    (0.42-0.7 m/s)  E/A Ratio:    0.54        (1.0-2.2)  MV e'         0.04 m/s    (>8.0)  MV lateral e' 0.05 m/s  MV medial e'  0.04 m/s  MV A Dur:     176.00 msec  E/e' Ratio:   11.36       (<8.0)    MITRAL VALVE:  Normal Ranges:  MV DT: 299 msec (150-240msec)    MITRAL INSUFFICIENCY:  Normal Ranges:  MR VTI:  128.00 cm  MR Vmax: 486.00 cm/s    AORTIC VALVE:  Normal Ranges:  AoV Mean P.0 mmHg (1.7-11.5mmHg)  LVOT Max Elder:            0.99 m/s (<=1.1m/s)  AoV VTI:                 33.50 cm (18-25cm)  LVOT VTI:                19.00 cm  LVOT Diameter:           2.10 cm  (1.8-2.4cm)  AoV Area, VTI:           1.96 cm2 (2.5-5.5cm2)  AoV Dimensionless Index: 0.57    AORTIC INSUFFICIENCY:  AI Vmax:      3.70 m/s  AI Half-time: 662 msec    RIGHT VENTRICLE:  RV 1   3.5 cm  RV 2   2.8 cm  RV 3   6.0 cm  TAPSE: 23.8 mm  RV s'  0.11 m/s    TRICUSPID VALVE/RVSP:  Normal Ranges:  Peak TR Velocity: 2.47 m/s  RV Syst Pressure: 27.4 mmHg (< 30mmHg)      [id number] Davy Will MD  Electronically signed on 2022 at 5:17:40 PM         Final     Stress Testing IMGRESULT([id number]:1:1825):   NM CARDIAC STRESS REST (MYOCARDIAL PERFUSION MIBI) 2023    Narrative  MRN: [id number]  Patient Name: MAIKEL CONTE    STUDY:  CARDIAC STRESS/REST INJECTION; PART 2 STRESS OR REST (NO CHARGE);  CARDIAC STRESS/REST (MYOCARDIAL PERFUSION/MIBI);  2023 10:29 am    INDICATION:  -  I25.10: Atherosclerotic heart disease of native coronary artery  without angina pectoris.    COMPARISON:  None.    ACCESSION NUMBER(S):  [id number]; [id number]; [id number]    ORDERING CLINICIAN:  TILA FAGAN    TECHNIQUE:  DIVISION OF  NUCLEAR MEDICINE  PHARMACOLOGIC STRESS MYOCARDIAL PERFUSION SCAN, ONE DAY PROTOCOL    The patient received an intravenous dose of  11.1 mCi of Tc-99m  tetrofosmin and resting emission tomographic (SPECT) images of the  myocardium were acquired. The patient then received an intravenous  infusion of 0.4mg regadenoson (Lexiscan) followed by an additional  dose of  33.6 mCi of Tc-99m tetrofosmin. Stress phase SPECT images of  the myocardium were then acquired. These included ECG-gated images to  assess and quantify ventricular function.    FINDINGS:  There is a small fixed perfusion defect inferior wall which resolves  on prone imaging suggesting diaphragmatic attenuation. No clear  reversible perfusion defects seen.    Calculated ejection fraction of 43% with global hypokinesis.    Impression  1. There is a small fixed perfusion defect inferior wall which  resolves on prone imaging suggesting diaphragmatic attenuation. No  clear reversible perfusion defects seen. There is a low probability  of ischemia.    2. Calculated ejection fraction of 43% with global hypokinesis.    Cardiac Catheterization: No results found for this or any previous visit from the past 1825 days.  Left Heart Cath, Left Heart Cath 08/16/2024    [facility], Cath Lab, [address] [place] [address]    Cardiovascular Catheterization Report    Patient Name:      MAIKEL CONTE    Performing Physician:  [id number] Reyes Cabrera MD  Study Date:        8/16/2024            Verifying Physician:   Shashi Cabrera MD  MRN/PID:           [id number]             Cardiologist/Co-Scrub:  Accession#:        [id number]         Ordering Provider:     [id number] CABRERA  Date of Birth/Age: 1950 / 73 years Cardiologist:  Gender:            M                    Fellow:  Encounter#:        [id number]           Surgeon:      Study: Left Heart Cath      Indications:  MAIKEL CONTE is a 74 year old male who presents  with dyslipidemia, hypertension and a chest pain assessment of atypical angina. New onset angina <=2 months and cardiomyopathy.    Procedure Description:  After infiltration with 2% Lidocaine, the right radial artery was cannulated with a modified Seldinger technique. Subsequently a 6 Argentine sheath was placed in the right radial artery. Selective coronary catheterization was performed using a 5 Fr and 6 Fr catheter(s) exchanged over a guide wire to cannulate the coronary arteries.  Multiple injections of contrast were made into the left and right coronary arteries with angiograms recorded in multiple projections. After completion of the procedure, the arterial sheath was pulled and a TR Band Radial Compression Device was utilized to obtain patent hemostasis.    Coronary Angiography:  The coronary circulation is right dominant.    Left Main Coronary Artery:  The left main coronary artery showed no significant disease or stenosis greater than 30%.    Left Anterior Descending Coronary Artery Distribution:  The mid left anterior descending coronary artery showed 40% stenosis. This lesion was focal.    Circumflex Coronary Artery Distribution:  The mid circumflex coronary artery showed 50% stenosis.    Right Coronary Artery Distribution:    The RCA showed a previous patent stent. The distal right coronary artery showed 40% stenosis.    Coronary Intervention Comments:  A 6Fr EBU 3.5 guide was used to cannulate the left main ostium and heparin was used for adjunctive anticoagulation. A pressure wire was placed in the distal LCx and IC NTG and IV adenosine was administered. After 2 minutes of adenosine infusion, FFR was 0.88.    Coronary Lesion Summary:  Vessel       Stenosis   Vessel Segment  LAD        40% stenosis      mid  Circumflex 50% stenosis      mid  RCA        40% stenosis     distal        Cardiac Cath Post Procedure Notes:  Post Procedure Diagnosis: Nonobstructive CAD, patent RCA stent.  Blood Loss:                Estimated blood loss during the procedure was 5cc mls.  Specimens Removed:        Number of specimen(s) removed: none.      Recommendations:  Maximize medical therapy.    ____________________________________________________________________________________  CONCLUSIONS:  1. Left main: no significant angiographic disease.  2. LAD: twin LAD system--40% mid LAD1 stenosis stenosis.  3. Lcx: 50% mid-vessel disease.  4. RCA: large caliber vessel, patent mid-vessel stent. 40% distal stenosis.  5. LVEDP 11mmHg, no aortic stenosis on LV-Ao gradient.  6. FFR of LCx = 0.88.    ICD 10 Codes:  Angina pectoris, unspecified-I20.9; Ventricular premature depolarization-I49.3    CPT Codes:  Left Heart Cath (visualization of coronaries) and LV-61546; FFR, initial Vessel (PCI)-07317; Moderate Sedation Services initial 15 minutes patient >5 years-43551; Moderate Sedation Services 1st additional 15 minutes patient >5 years-88875    [id number] Reyes Cabrera MD  Performing Physician  Electronically signed by Shashi Cabrera MD on 8/18/2024 at 2:17:58 PM          ** Final **     Cardiac Scoring: No results found for this or any previous visit from the past 1825 days.    AAA : No results found for this or any previous visit from the past 1825 days.    OTHER: No results found for this or any previous visit from the past 1825 days.    LAST IMAGING RESULTS  Cardiac Catheterization Procedure     [facility], Cath Lab, [address] [address]    Cardiovascular Catheterization Report    Patient Name:      MAIKEL CONTE    Performing Physician:  [id number] Cabrera MD  Study Date:        8/16/2024            Verifying Physician:   Shashi Cabrera MD  MRN/PID:           [id number]             Cardiologist/Co-Scrub:  Accession#:        [id number]         Ordering Provider:      [id number] DIANA LANTIGUA  Date of Birth/Age: 1950 / 73 years Cardiologist:  Gender:            M                    Fellow:  Encounter#:        [id number]           Surgeon:       Study: Left Heart Cath       Indications:  MAIKEL CONTE is a 74 year old male who presents with dyslipidemia, hypertension and a chest pain assessment of atypical angina. New onset angina <=2 months and cardiomyopathy.     Procedure Description:  After infiltration with 2% Lidocaine, the right radial artery was cannulated with a modified Seldinger technique. Subsequently a 6 Palauan sheath was placed in the right radial artery. Selective coronary catheterization was performed using a 5 Fr and 6 Fr catheter(s) exchanged over a guide wire to cannulate the coronary arteries.  Multiple injections of contrast were made into the left and right coronary arteries with angiograms recorded in multiple projections. After completion of the procedure, the arterial sheath was pulled and a TR Band Radial Compression Device was utilized to obtain patent hemostasis.     Coronary Angiography:  The coronary circulation is right dominant.     Left Main Coronary Artery:  The left main coronary artery showed no significant disease or stenosis greater than 30%.     Left Anterior Descending Coronary Artery Distribution:  The mid left anterior descending coronary artery showed 40% stenosis. This lesion was focal.     Circumflex Coronary Artery Distribution:  The mid circumflex coronary artery showed 50% stenosis.     Right Coronary Artery Distribution:    The RCA showed a previous patent stent. The distal right coronary artery showed 40% stenosis.     Coronary Intervention Comments:  A 6Fr EBU 3.5 guide was used to cannulate the left main ostium and heparin was used for adjunctive anticoagulation. A pressure wire was placed in the distal LCx and IC NTG and IV adenosine was administered. After  2 minutes of adenosine infusion, FFR was 0.88.     Coronary Lesion Summary:  Vessel       Stenosis   Vessel Segment  LAD        40% stenosis      mid  Circumflex 50% stenosis      mid  RCA        40% stenosis     distal          Cardiac Cath Post Procedure Notes:  Post Procedure Diagnosis: Nonobstructive CAD, patent RCA stent.  Blood Loss:               Estimated blood loss during the procedure was 5cc mls.  Specimens Removed:        Number of specimen(s) removed: none.       Recommendations:  Maximize medical therapy.    ____________________________________________________________________________________  CONCLUSIONS:   1. Left main: no significant angiographic disease.   2. LAD: twin LAD system--40% mid LAD1 stenosis stenosis.   3. Lcx: 50% mid-vessel disease.   4. RCA: large caliber vessel, patent mid-vessel stent. 40% distal stenosis.   5. LVEDP 11mmHg, no aortic stenosis on LV-Ao gradient.   6. FFR of LCx = 0.88.    ICD 10 Codes:  Angina pectoris, unspecified-I20.9; Ventricular premature depolarization-I49.3     CPT Codes:  Left Heart Cath (visualization of coronaries) and LV-56341; FFR, initial Vessel (PCI)-76967; Moderate Sedation Services initial 15 minutes patient >5 years-20037; Moderate Sedation Services 1st additional 15 minutes patient >5 years-47779     [id number] Reyes Cabrera MD  Performing Physician  Electronically signed by [id number] Reyes Cabrera MD on 8/18/2024 at 2:17:58 PM         ** Final **    Problem List Items Addressed This Visit       CAD (coronary artery disease) - Primary    Benign essential hypertension    Hyperlipidemia    Aortic valve regurgitation    HFrEF (heart failure with reduced ejection fraction) (Multi)    Frequent PVCs    Tiredness    Lightheadedness         Albaro Ovalles DO, FACC, FACOI

## 2024-10-02 ENCOUNTER — APPOINTMENT (OUTPATIENT)
Dept: CARDIOLOGY | Facility: CLINIC | Age: 74
End: 2024-10-02
Payer: MEDICARE

## 2024-10-02 VITALS
HEIGHT: 66 IN | HEART RATE: 70 BPM | BODY MASS INDEX: 25.13 KG/M2 | SYSTOLIC BLOOD PRESSURE: 130 MMHG | WEIGHT: 156.4 LBS | DIASTOLIC BLOOD PRESSURE: 62 MMHG

## 2024-10-02 DIAGNOSIS — E78.00 PURE HYPERCHOLESTEROLEMIA: ICD-10-CM

## 2024-10-02 DIAGNOSIS — I50.20 HFREF (HEART FAILURE WITH REDUCED EJECTION FRACTION): ICD-10-CM

## 2024-10-02 DIAGNOSIS — I10 BENIGN ESSENTIAL HYPERTENSION: ICD-10-CM

## 2024-10-02 DIAGNOSIS — I25.10 ATHEROSCLEROSIS OF NATIVE CORONARY ARTERY OF NATIVE HEART WITHOUT ANGINA PECTORIS: ICD-10-CM

## 2024-10-02 DIAGNOSIS — I25.10 CORONARY ARTERY DISEASE INVOLVING NATIVE CORONARY ARTERY OF NATIVE HEART WITHOUT ANGINA PECTORIS: Primary | ICD-10-CM

## 2024-10-02 DIAGNOSIS — R42 LIGHTHEADEDNESS: ICD-10-CM

## 2024-10-02 DIAGNOSIS — I49.3 FREQUENT PVCS: ICD-10-CM

## 2024-10-02 DIAGNOSIS — E78.2 MIXED HYPERLIPIDEMIA: ICD-10-CM

## 2024-10-02 DIAGNOSIS — I35.1 NONRHEUMATIC AORTIC VALVE INSUFFICIENCY: ICD-10-CM

## 2024-10-02 DIAGNOSIS — R53.83 TIREDNESS: ICD-10-CM

## 2024-10-02 PROCEDURE — 1159F MED LIST DOCD IN RCRD: CPT | Performed by: INTERNAL MEDICINE

## 2024-10-02 PROCEDURE — 1123F ACP DISCUSS/DSCN MKR DOCD: CPT | Performed by: INTERNAL MEDICINE

## 2024-10-02 PROCEDURE — 99215 OFFICE O/P EST HI 40 MIN: CPT | Performed by: INTERNAL MEDICINE

## 2024-10-02 PROCEDURE — 1036F TOBACCO NON-USER: CPT | Performed by: INTERNAL MEDICINE

## 2024-10-02 PROCEDURE — 3075F SYST BP GE 130 - 139MM HG: CPT | Performed by: INTERNAL MEDICINE

## 2024-10-02 PROCEDURE — 93005 ELECTROCARDIOGRAM TRACING: CPT | Performed by: INTERNAL MEDICINE

## 2024-10-02 PROCEDURE — 3078F DIAST BP <80 MM HG: CPT | Performed by: INTERNAL MEDICINE

## 2024-10-02 PROCEDURE — 93010 ELECTROCARDIOGRAM REPORT: CPT | Performed by: INTERNAL MEDICINE

## 2024-10-03 ENCOUNTER — ANCILLARY PROCEDURE (OUTPATIENT)
Dept: CARDIOLOGY | Facility: CLINIC | Age: 74
End: 2024-10-03
Payer: MEDICARE

## 2024-10-03 ENCOUNTER — OFFICE VISIT (OUTPATIENT)
Dept: CARDIOLOGY | Facility: CLINIC | Age: 74
End: 2024-10-03
Payer: MEDICARE

## 2024-10-03 VITALS
BODY MASS INDEX: 25.33 KG/M2 | DIASTOLIC BLOOD PRESSURE: 68 MMHG | OXYGEN SATURATION: 98 % | SYSTOLIC BLOOD PRESSURE: 152 MMHG | HEART RATE: 73 BPM | WEIGHT: 152 LBS | HEIGHT: 65 IN

## 2024-10-03 DIAGNOSIS — I50.20 HFREF (HEART FAILURE WITH REDUCED EJECTION FRACTION): ICD-10-CM

## 2024-10-03 DIAGNOSIS — I42.9 CARDIOMYOPATHY, UNSPECIFIED TYPE (MULTI): Primary | ICD-10-CM

## 2024-10-03 DIAGNOSIS — E85.4 CARDIAC AMYLOIDOSIS: Primary | ICD-10-CM

## 2024-10-03 DIAGNOSIS — I43 CARDIAC AMYLOIDOSIS: ICD-10-CM

## 2024-10-03 DIAGNOSIS — E85.4 CARDIAC AMYLOIDOSIS: ICD-10-CM

## 2024-10-03 DIAGNOSIS — I43 CARDIAC AMYLOIDOSIS: Primary | ICD-10-CM

## 2024-10-03 PROCEDURE — 1126F AMNT PAIN NOTED NONE PRSNT: CPT | Performed by: STUDENT IN AN ORGANIZED HEALTH CARE EDUCATION/TRAINING PROGRAM

## 2024-10-03 PROCEDURE — 3078F DIAST BP <80 MM HG: CPT | Performed by: STUDENT IN AN ORGANIZED HEALTH CARE EDUCATION/TRAINING PROGRAM

## 2024-10-03 PROCEDURE — 99215 OFFICE O/P EST HI 40 MIN: CPT | Performed by: STUDENT IN AN ORGANIZED HEALTH CARE EDUCATION/TRAINING PROGRAM

## 2024-10-03 PROCEDURE — 99205 OFFICE O/P NEW HI 60 MIN: CPT | Performed by: STUDENT IN AN ORGANIZED HEALTH CARE EDUCATION/TRAINING PROGRAM

## 2024-10-03 PROCEDURE — 93005 ELECTROCARDIOGRAM TRACING: CPT

## 2024-10-03 PROCEDURE — 1159F MED LIST DOCD IN RCRD: CPT | Performed by: STUDENT IN AN ORGANIZED HEALTH CARE EDUCATION/TRAINING PROGRAM

## 2024-10-03 PROCEDURE — 1036F TOBACCO NON-USER: CPT | Performed by: STUDENT IN AN ORGANIZED HEALTH CARE EDUCATION/TRAINING PROGRAM

## 2024-10-03 PROCEDURE — 1123F ACP DISCUSS/DSCN MKR DOCD: CPT | Performed by: STUDENT IN AN ORGANIZED HEALTH CARE EDUCATION/TRAINING PROGRAM

## 2024-10-03 PROCEDURE — 3077F SYST BP >= 140 MM HG: CPT | Performed by: STUDENT IN AN ORGANIZED HEALTH CARE EDUCATION/TRAINING PROGRAM

## 2024-10-03 RX ORDER — SPIRONOLACTONE 25 MG/1
25 TABLET ORAL DAILY
Qty: 30 TABLET | Refills: 11 | Status: SHIPPED | OUTPATIENT
Start: 2024-10-03 | End: 2025-10-03

## 2024-10-03 ASSESSMENT — PAIN SCALES - GENERAL: PAINLEVEL: 0-NO PAIN

## 2024-10-03 NOTE — PROGRESS NOTES
"Referring Clinician: Dr. Ovalles  Accompanied by: wife    HPI:     73 y.o. retired  (retired 2014) who presents for advanced heart failure care.  My final recommendations will be communicated back to the requesting clinician  by way of shared Medical record.   Review of the electronic medical record shows a past medical history significant for hypertension, hyperlipidemia, mild aortic regurgitation, history of PVCs,LBBB, nonobstructive coronary disease and on [facility] 8/2024 found to have 40%LAD 50%  mLCx patent  mRCA stent with 40% dRCA stenosis which is medically managed.  He has a history of HFmrEF and on TTE 7/2024 LVEF 40-45% LVIDD 5.7 cm and changes consistent with restrictive cardiomyopathy, normal right ventricular systolic function, mild to moderate mitral regurgitation, mild aortic regurgitation.  For HFmrEF he is currently maintained on sacubitril/valsartan 49/51 mg twice daily, empagliflozin, and carvedilol 12.5 mg twice daily.  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  He also had serum light chains.  SPEP and UPEP with immunofixation were negative.  He had a normal kappa: Lambda ratio, but was noted to have an elevation in Igkappa levels and is due to see the hematology team.  Most recent BNP 6/2024: 350  He has also already been requested for cardiac MRI    Symptomatically, there is ** chest pain, SOB at rest, SOBOE, PND, orthopnoea, bendopnoea, fatigue, ankle swelling, palpitations, syncope, or pre syncope.** increaed SOB x 3 months   Gets  intermittent chest pain with exertion that comes and goes.  He also has a history of increased exertional dyspnea since approximately July 2024.  He has had a recent left heart catheterization (8/2024) and is medically managed.  He also has a chronic, unchanged 1 pillow orthopnea.  He does have \"a little dizziness\" with quick standing but denies other cardiovascular symptoms.    He " "walks 1 to 1.5 miles regularly without exertional symptoms.    Notably, he is not taking aspirin, he found it caused skin bruising which he did not like.    He is adherent with prescribed medications but is struggling with some medication costs.    Surgical Hx:  -Shoulder surgery  -Hemorrhoid surgery  - 3 hip replacement surgeries    Social Hx:  - Smoking- quit at age ~ 33 years  - ETOH- nil now,  never heavy drinker  - Illicit drugs- never  - Lives with wife, and occasional foster children.  Feels safe at home  - 3 healthy adult children    Family Hx:  Specifically, there is no family history of  CAD, heart failure, ICD, PPM, LVAD, OHT, arrhythmias, CVA, or sudden cardiac death.    Father - \" enlarged heart\", emphysema   Sister -  heart disease     Medication reconciliation completed, see below.     ROS   Negative except for as detailed in HPI     Investigations:    The electronic medical record has been reviewed by me for salient history. All cardiovascular imaging and testing available in the electronic medical record, and [employer] has been reviewed. The most recent ECG (10/3/2024) has been reviewed independently by me.     Visit Vitals  /68 (Patient Position: Sitting)   Pulse 73   Ht 1.651 m (5' 5\")   Wt 68.9 kg (152 lb)   SpO2 98%   BMI 25.29 kg/m²   Smoking Status Former   BSA 1.78 m²      On examination:    Very pleasant elderly  man in no apparent CP or painful distress  Very well  groomed   Neck: No JVD or HJR  CVS: HS 1,2.  Ectopic beats.  No added sounds  Resp: CTA bilaterally. Percussion note resonant  Abdomen: Flat, SNT, BS wnl  Extremities: No pedal oedema  Skin: warm and dry  CNS: AO x 4, no gross deficits        Chemistry    Lab Results   Component Value Date/Time     08/02/2024 1002    K 4.1 08/02/2024 1002     08/02/2024 1002    CO2 25 08/02/2024 1002    BUN 20 08/02/2024 1002    CREATININE 0.95 08/02/2024 1002    Lab Results   Component Value Date/Time    CALCIUM 9.6 " 08/02/2024 1002    ALKPHOS 112 12/05/2023 1055    AST 27 12/05/2023 1055    ALT 26 12/05/2023 1055    BILITOT 1.8 (H) 12/05/2023 1055        BNP ( 6/2024): 380    Lab Results   Component Value Date    WBC 5.5 08/02/2024    HGB 14.6 08/02/2024    HCT 44.3 08/02/2024    MCV 90 08/02/2024     08/02/2024     Results for orders placed during the hospital encounter of 07/11/24    Transthoracic Echo (TTE) Complete    Narrative  [address] [id number]  Phone [phone] Fax [phone]    TRANSTHORACIC ECHOCARDIOGRAM REPORT    Patient Name:      MAIKEL Ignacio Physician:    [id number] Ovalles DO  Study Date:        7/11/2024            Ordering Provider:    [id number] OVALLES  MRN/PID:           [id number]             Fellow:  Accession#:        [id number]         Nurse:  Date of Birth/Age: 1950 / 73 years Sonographer:          Shruti De Oliveira RDCS  Gender:            M                    Additional Staff:  Height:            167.64 cm            Admit Date:           7/11/2024  Weight:            70.31 kg             Admission Status:     Outpatient  BSA / BMI:         1.79 m2 / 25.02      Department Location:  [facility] Echo  kg/m2                                      Lab  Blood Pressure: 162 /84 mmHg    Study Type:    TRANSTHORACIC ECHO (TTE) COMPLETE  Diagnosis/ICD: Ventricular premature depolarization-I49.3; Atherosclerotic heart  disease of native coronary artery without angina pectoris-I25.10;  Unspecified systolic (congestive) heart failure (CHF)-I50.20;  Essential (primary) hypertension-I10  Indication:    Abnormal EKG, CAD, HTN  CPT Codes:     Echo Complete w Full Doppler-66407  Study Detail: The following Echo studies were performed: 2D, M-Mode, Doppler and  color flow. Patient's heart rhythm is with premature ventricular  contractions.      PHYSICIAN INTERPRETATION:  Left Ventricle: The left ventricular systolic function is mildly decreased,  with a visually estimated ejection fraction of 40-45%. There is global hypokinesis of the left ventricle with minor regional variations. The left ventricular cavity size is mildly dilated. The left ventricular septal wall thickness is moderately increased. There is mildly increased left ventricular posterior wall thickness. Left Ventricular Global Longitudinal Strain - 8.4 %. Spectral Doppler shows an impaired relaxation pattern of left ventricular diastolic filling. Reduced LV strain at -8.4% with apical sparing pattern which can be seen in restrictive cardiomyopathies such as cardiac amyloidosis.  Left Atrium: The left atrium is normal in size.  Right Ventricle: The right ventricle is normal in size. There is normal right ventricular global systolic function.  Right Atrium: The right atrium is normal in size.  Aortic Valve: The aortic valve is probably trileaflet. There is mild aortic valve thickening. The aortic valve dimensionless index is 0.93. There is mild aortic valve regurgitation. The peak instantaneous gradient of the aortic valve is 11.5 mmHg. The mean gradient of the aortic valve is 6.0 mmHg.  Mitral Valve: The mitral valve is mildly thickened. There is mild mitral annular calcification. There is mild to moderate mitral valve regurgitation.  Tricuspid Valve: The tricuspid valve is structurally normal. There is mild tricuspid regurgitation. The Doppler estimated RVSP is slightly elevated at 34.9 mmHg.  Pulmonic Valve: The pulmonic valve is structurally normal. There is physiologic pulmonic valve regurgitation.  Pericardium: There is no pericardial effusion noted.  Aorta: The aortic root is normal.      CONCLUSIONS:  1. The left ventricular systolic function is mildly decreased, with a visually estimated ejection fraction of 40-45%.  2. There is global hypokinesis of the left ventricle with minor regional variations.  3. Spectral Doppler shows an impaired relaxation pattern of left ventricular diastolic  filling.  4. Left ventricular cavity size is mildly dilated.  5. Moderately increased left ventricular septal thickness.  6. Reduced LV strain at -8.4% with apical sparing pattern which can be seen in restrictive cardiomyopathies such as cardiac amyloidosis.  7. There is normal right ventricular global systolic function.  8. Mild to moderate mitral valve regurgitation.  9. Slightly elevated RVSP.  10. Mild aortic valve regurgitation.    QUANTITATIVE DATA SUMMARY:  2D MEASUREMENTS:  Normal Ranges:  IVSd:          1.61 cm    (0.6-1.1cm)  LVPWd:         1.16 cm    (0.6-1.1cm)  LVIDd:         5.72 cm    (3.9-5.9cm)  LVIDs:         4.74 cm  LV Mass Index: 197.8 g/m2  LV % FS        17.2 %    LA VOLUME:  Normal Ranges:  LA Vol A4C:        59.1 ml    (22+/-6mL/m2)  LA Vol A2C:        62.5 ml  LA Vol BP:         62.5 ml  LA Vol Index A4C:  32.9 ml/m2  LA Vol Index A2C:  34.9 ml/m2  LA Vol Index BP:   34.8 ml/m2  LA Area A4C:       19.2 cm2  LA Area A2C:       20.3 cm2  LA Major Axis A4C: 5.3 cm  LA Major Axis A2C: 5.6 cm  LA Volume Index:   32.2 ml/m2  LA Vol A4C:        55.4 ml  LA Vol A2C:        59.4 ml    RA VOLUME BY A/L METHOD:  Normal Ranges:  RA Area A4C: 17.0 cm2    M-MODE MEASUREMENTS:  Normal Ranges:  Ao Root: 3.40 cm (2.0-3.7cm)  AoV Exc: 2.00 cm (1.5-2.5cm)    AORTA MEASUREMENTS:  Normal Ranges:  AoV Exc:     2.00 cm (1.5-2.5cm)  Ao Sinus, d: 2.90 cm (2.1-3.5cm)  Ao STJ, d:   2.30 cm (1.7-3.4cm)  Asc Ao, d:   3.30 cm (2.1-3.4cm)    LV SYSTOLIC FUNCTION BY 2D PLANIMETRY (MOD):  Normal Ranges:  EF-A4C View:                      57 % (>=55%)  EF-A2C View:                      59 %  EF-Biplane:                       58 %  EF-Visual:                        43 %  LV EF Reported:                   43 %  Global Longitudinal Strain (GLS): 8 %    LV DIASTOLIC FUNCTION:  Normal Ranges:  MV Peak E:    0.60 m/s    (0.7-1.2 m/s)  MV Peak A:    1.05 m/s    (0.42-0.7 m/s)  E/A Ratio:    0.57        (1.0-2.2)  MV e'          0.056 m/s   (>8.0)  MV lateral e' 0.06 m/s  MV medial e'  0.05 m/s  MV A Dur:     171.27 msec  E/e' Ratio:   10.68       (<8.0)    MITRAL VALVE:  Normal Ranges:  MV DT: 259 msec (150-240msec)    AORTIC VALVE:  Normal Ranges:  AoV Vmax:                1.69 m/s  (<=1.7m/s)  AoV Peak P.5 mmHg (<20mmHg)  AoV Mean P.0 mmHg  (1.7-11.5mmHg)  LVOT Max Elder:            1.34 m/s  (<=1.1m/s)  AoV VTI:                 30.24 cm  (18-25cm)  LVOT VTI:                28.22 cm  LVOT Diameter:           2.04 cm   (1.8-2.4cm)  AoV Area, VTI:           3.06 cm2  (2.5-5.5cm2)  AoV Area,Vmax:           2.59 cm2  (2.5-4.5cm2)  AoV Dimensionless Index: 0.93    AORTIC INSUFFICIENCY:  AI Vmax:       3.76 m/s  AI Half-time:  819 msec  AI Decel Time: 2823 msec  AI Decel Rate: 133.03 cm/s2      RIGHT VENTRICLE:  RV Basal 3.59 cm  RV Mid   3.10 cm  RV Major 6.4 cm  TAPSE:   22.1 mm  RV s'    0.11 m/s    TRICUSPID VALVE/RVSP:  Normal Ranges:  Peak TR Velocity: 2.83 m/s  RV Syst Pressure: 34.9 mmHg (< 30mmHg)  TV e'             0.1 m/s    AORTA:  Asc Ao Diam 3.26 cm    IMPRESSION:    73 y.o. retired  (retired ) who presents for advanced heart failure care.  My final recommendations will be communicated back to the requesting clinician  by way of shared Medical record.   Review of the electronic medical record shows a past medical history significant for hypertension, hyperlipidemia, mild aortic regurgitation, history of PVCs,LBBB, nonobstructive coronary disease and on [facility] 2024 found to have 40%LAD 50%  mLCx patent  mRCA stent with 40% dRCA stenosis which is medically managed.  He has a history of HFmrEF and on TTE 2024 LVEF 40-45% LVIDD 5.7 cm and changes consistent with restrictive cardiomyopathy, normal right ventricular systolic function, mild to moderate mitral regurgitation, mild aortic regurgitation.  For HFmrEF he is currently maintained on sacubitril/valsartan 49/51 mg twice daily,  empagliflozin, and carvedilol 12.5 mg twice daily.  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  He also had serum light chains.  SPEP and UPEP with immunofixation were negative.  He had a normal kappa: Lambda ratio, but was noted to have an elevation in Igkappa levels and is due to see the hematology team.  Most recent BNP 6/2024: 350  He has also already been requested for cardiac MRI    NYHA Functional Class: 2  ACC/AHA Stage B heart failure  Volume status: Euvolaemic  Perfusion status: Warm to touch  Aetiology: Cardiac amyloidosis, ATTR    PLAN:  #ATTR amyloidosis  He is due to see the hematology service for his elevated IgG kappa levels, I do not think this is consistent with an AL amyloidosis given the preponderance of evidence.  Will go ahead and start tafamidis (Vyndamax) 61 mg once daily.  Or amyloidosis nurse will work with him on the cost of this new medication, and facilitate genetic testing.  We did discuss that if positive, he will be recommended to share his genetic information with his children and siblings.    -For HFrEF he will be continued on empagliflozin, sacubitril/valsartan (he has not yet started this medication but will collect from the pharmacy today), and empagliflozin.  -Spironolactone 25 mg once daily also will be commenced today.  Close renal function monitoring after initiation    He has had some trouble affording his medications and will be referred to our pharmacy assistance team for cost assistance.    Continue cardiovascular care with Dr. Ovalles    This note was transcribed using the Dragon Dictation system. There may be grammatical, punctuation, or verbiage errors that can occur with voice recognition programs.    Karen Hammer MD PhD

## 2024-10-03 NOTE — PATIENT INSTRUCTIONS
Thank you for coming in today. If you have any questions or concerns, you may call the Heart Failure Office at [phone] option [phone], or [phone].  You may also contact our heart failure nursing team via email on [email or web address].    For quicker results set-up your  [employer] account to receive results and other correspondence directly to your phone.    Please bring all your pills/medications to your Cardiology appointments.    **  -Our amyloidosis nurse, Ana, will be in touch with you to assist with the cost of your new medication Vyndamax.  She will also work with you to arrange for genetic testing for cardiac amyloidosis    - Please make the following medication changes:  1.  START Vyndamax 61 mg once daily    2.  START spironolactone 25 mg once daily    - Please have the following tests done:  Blood tests in 1 week (RFP, BNP, TSH, lipids, CBC)    2.  Blood tests in 4 weeks (RFP, BNP)    -You will be referred to the following teams, CALL  [phone] to schedule your appointments with:  1. Pharmacy Assistance team  to help with medication costs ( Jardiance, Entresto)    -Please continue your cardiology care with Dr. Ovalles    - Please make an appointment to be seen in 6 months at [facility]

## 2024-10-07 ENCOUNTER — SPECIALTY PHARMACY (OUTPATIENT)
Dept: PHARMACY | Facility: CLINIC | Age: 74
End: 2024-10-07

## 2024-10-08 PROCEDURE — RXMED WILLOW AMBULATORY MEDICATION CHARGE

## 2024-10-09 ENCOUNTER — PHARMACY VISIT (OUTPATIENT)
Dept: PHARMACY | Facility: CLINIC | Age: 74
End: 2024-10-09
Payer: MEDICARE

## 2024-10-09 ENCOUNTER — OFFICE VISIT (OUTPATIENT)
Dept: HEMATOLOGY/ONCOLOGY | Facility: CLINIC | Age: 74
End: 2024-10-09
Payer: MEDICARE

## 2024-10-09 VITALS
HEIGHT: 64 IN | RESPIRATION RATE: 16 BRPM | TEMPERATURE: 97.3 F | BODY MASS INDEX: 26.59 KG/M2 | WEIGHT: 155.75 LBS | DIASTOLIC BLOOD PRESSURE: 75 MMHG | SYSTOLIC BLOOD PRESSURE: 149 MMHG | OXYGEN SATURATION: 94 % | HEART RATE: 72 BPM

## 2024-10-09 DIAGNOSIS — R76.8 ELEVATED SERUM IMMUNOGLOBULIN FREE LIGHT CHAINS: ICD-10-CM

## 2024-10-09 DIAGNOSIS — K90.9 IDIOPATHIC STEATORRHEA (HHS-HCC): ICD-10-CM

## 2024-10-09 DIAGNOSIS — D50.9 NORMOCYTIC HYPOCHROMIC ANEMIA: ICD-10-CM

## 2024-10-09 PROCEDURE — 3078F DIAST BP <80 MM HG: CPT | Performed by: PHYSICIAN ASSISTANT

## 2024-10-09 PROCEDURE — 1126F AMNT PAIN NOTED NONE PRSNT: CPT | Performed by: PHYSICIAN ASSISTANT

## 2024-10-09 PROCEDURE — 99205 OFFICE O/P NEW HI 60 MIN: CPT | Performed by: PHYSICIAN ASSISTANT

## 2024-10-09 PROCEDURE — 3077F SYST BP >= 140 MM HG: CPT | Performed by: PHYSICIAN ASSISTANT

## 2024-10-09 PROCEDURE — 99215 OFFICE O/P EST HI 40 MIN: CPT | Performed by: PHYSICIAN ASSISTANT

## 2024-10-09 PROCEDURE — 1159F MED LIST DOCD IN RCRD: CPT | Performed by: PHYSICIAN ASSISTANT

## 2024-10-09 PROCEDURE — 1123F ACP DISCUSS/DSCN MKR DOCD: CPT | Performed by: PHYSICIAN ASSISTANT

## 2024-10-09 SDOH — ECONOMIC STABILITY: FOOD INSECURITY: WITHIN THE PAST 12 MONTHS, THE FOOD YOU BOUGHT JUST DIDN'T LAST AND YOU DIDN'T HAVE MONEY TO GET MORE.: NEVER TRUE

## 2024-10-09 SDOH — ECONOMIC STABILITY: FOOD INSECURITY: WITHIN THE PAST 12 MONTHS, YOU WORRIED THAT YOUR FOOD WOULD RUN OUT BEFORE YOU GOT MONEY TO BUY MORE.: NEVER TRUE

## 2024-10-09 SDOH — ECONOMIC STABILITY: FOOD INSECURITY: WITHIN THE PAST 12 MONTHS, YOU WORRIED THAT YOUR FOOD WOULD RUN OUT BEFORE YOU GOT THE MONEY TO BUY MORE.: NEVER TRUE

## 2024-10-09 ASSESSMENT — PAIN SCALES - GENERAL: PAINLEVEL: 0-NO PAIN

## 2024-10-10 ENCOUNTER — TELEMEDICINE CLINICAL SUPPORT (OUTPATIENT)
Dept: PHARMACY | Facility: HOSPITAL | Age: 74
End: 2024-10-10
Payer: MEDICARE

## 2024-10-10 DIAGNOSIS — I43 CARDIAC AMYLOIDOSIS: ICD-10-CM

## 2024-10-10 DIAGNOSIS — E85.4 CARDIAC AMYLOIDOSIS: ICD-10-CM

## 2024-10-10 NOTE — PROGRESS NOTES
[facility] Clinical Note    Tre Sumner is a 73 y.o. male, who is on the specialty pharmacy service for management of:  Cardiology Core.    Tre Sumner is starting: Vyndamax 61mg by mouth once daily.    Medication Receipt Date: 10/10/24  Medication Start Date (planned or actual): LATRICE    Tre was contacted on 10/10/2024 at 12:39 PM for a virtual pharmacy visit with encounter number [id number] from:   [facility]  [address]  [facility] [address]  [place] [address]  Dept: [phone]  Dept Fax: [phone]  Loc: [phone]    Tre was offered a Telemedicine Video visit or Telephone visit.  Tre consented to a telephone visit, which was performed.    The most recent encounter visit with the referring prescriber Karen Hammer MD PhD on 10/3/24 was reviewed.  Pharmacy will continue to collaborate in the care of this patient with the referring prescriber Karen Hammer MD PhD.    General Assessment  New-start education reviewed with patient over the phone:  Clinical Background:  The patient's medication list, allergies, and comorbid conditions were verified. No contraindications to therapy noted at this time.  Patient advised to contact the pharmacy if there are any changes to medication list, including prescriptions, OTC medications, herbal products, or supplements.  Currently, clinically significant drug-drug interactions include: None  Medication is clinically appropriate  Education/Discussion:  Patient accepted the pharmacist's offer to counseling.  Indication:  Tafamidis is a transthyretin (TTR) stabilizer that selectively binds to and stabilizes the TTR transport protein, slowing dissociation into monomers which is the rate-limiting step in the amyloidogenic process. Tafamidis is used for the treatment of cardiomyopathy of wild type or hereditary transthyretin-mediated amyloidosis (ATTR-CM) in adults to reduce  cardiovascular mortality and cardiovascular-related hospitalization.  Dosing: Oral:  Tafamidis (Vyndamax): 61mg once daily.  Administration:  Take with or without food at approximately the same time each day. Swallow capsules whole; do not chew, break, crush or cut.  Missed doses:  Take a missed dose as soon as you think about it. If it is close to the time for your next dose, skip the missed dose and go back to your normal time. Do not take 2 doses at the same time or extra doses.  Warnings, Precautions, and Adverse Reactions:  Discussed how in studies, the incidence of side effects was similar to placebo. Diarrhea was reported as a possible side effect during post approval use. Advised patient to discuss any ongoing diarrhea with provider that does not resolve with use of supportive medication over the counter. Discussed risk of hypersensitivity reactions. If signs or symptoms of serious hypersensitivity reactions occur, discontinue treatment and seek immediate care to monitor until signs and symptoms resolve.  Discussed warnings and precautions on appropriate use: Use only the brand of this medication that your doctor prescribed. Tafamidis (Vyndamax) and tafamidis meglumine (Vyndaqel) are not substitutable on a per mg basis.  Handling and Storage:  Store at room temperature 20°C to 25°C (68°F to 77°F); excursions permitted to 15°C to 30°C (59°F to 86°F). Keep in original packaging and store in a dry place, away from heat, moisture, and direct light and out of the reach of children and pets. Do not store in a bathroom.  Disposal:  Properly dispose of unused or  drug. Do not flush down a toilet or pour down a drain. Take any unused medication to an appropriate police station for proper disposal or drug take-back program. (You can use Rxdrugdropbox.org to see police stations in your area that are able to take back the medication).  Goals of therapy:  Slow disease progression  Stabilize the TTR (transthyretin)  protein to decrease its dissociation into faulty proteins (fibrils) that buildup in your heart and impacts how well your heart pumps blood  Decrease cardiovascular-related hospitalizations and all-cause mortality   Patient goals include reducing the impact of amyloid cardiomyopathy on overall quality of life and to decrease the rate of decline in functional status  Efficacy timeline:  -Differences in functional status and symptoms may be seen at 6 months   -There are multiple approaches to monitoring disease progression since this is an area of ongoing research and understanding (using biomarkers, imaging, etc.)  -Study trial duration was 30 months (2.5 years) to see significant impact on hospitalizations and mortality    Impression/Plan:  Is patient high risk? Yes- geriatric   Is laboratory follow-up needed? As directed per provider  Is a clinical intervention needed? No  Next reassessment date? Approx. 3 months  Additional comments:     Refer to the encounter summary report for documentation details about patient counseling and education.      Medication Adherence    The importance of adherence was discussed with the patient and they were advised to take the medication as prescribed by their provider. Patient was encouraged to call their physician's office if they have a question regarding a missed dose.     QOL/Patient Satisfaction  Rate your quality of life on scale of 1-10: -- (Unable to assess)  Rate your satisfaction with  Specialty Pharmacy on scale of 1-10: -- (Unable to assess)      Patient was advised to contact the pharmacy if there are any changes to their medication list, including prescriptions, OTC medications, herbal products, or supplements. Patient was advised of [facility]'s dispensing process, refill timeline, contact information ([phone]), and patient management follow up. Patient confirmed understanding of education conducted during assessment. All patient  questions and concerns were addressed to the best of my ability. Patient was encouraged to contact the specialty pharmacy with any questions or concerns.    Confirmed follow-up outreaches are properly scheduled and reviewed goals of therapy with the patient.        Magdalene Rojas, PharmD

## 2024-10-15 NOTE — PROGRESS NOTES
Reason for Visit  Tre Sumner is a 73 y.o. male with PMH s/f CAD, HFrEF newly diagnosed TTR amyloidosis referred by Dr. Ovalles for elevated FLC.    PMH/PSH: HTN, HL, DMII, mild aortic regurgitation, history of PVCs,LBBB, nonobstructive coronary disease and on [facility] 8/2024 found to have 40%LAD 50% mLCx patent mRCA stent with 40% dRCA stenosis, aortic valve regurgitation, BPH,   FH: Brother with liver cancer, brother with prostate cancer, sister with breast cancer  Soc Hx:  Former smoker, denies ETOH, illicit drugs; retired ,  with 3 children.    History of Present Illness:  He has a history of HFmrEF and on TTE 7/2024 LVEF 40-45%. He underwent PYP scan in 8/2024 which showed grade 2 changes consistent with TTR cardiac amyloidosis. He also had serum light chains with mild elevation in Kappa light chain to 3.05. SPEP and UPEP with immunofixation were negative. No evidence of end organ damage, no anemia, hypercalcemia, kidney dysfunction. Started on Vyndamax by HF cardiologist.    On assessment, reports chronic SOB/VARELA and cough. He denies any neuropathy or carpal tunnel symptoms. Recently started on spirolactone and sacubitril/valsartan and noted to have geographic tongue and rash on face. He also notes chronic bloating and indigestion. C-scope in 11/2020 c/w diverticulosis in the sigmoid colon. Otherwise feeling well. Denies B symptoms, n/v/d/c/abd pain, bleeding from any source, frequent infections, neuropathy/CPT symptoms,     Review of Systems: All of the systems have been reviewed and are negative for complaints except what is stated in the HPI and/or past medical history.    Allergies and Intolerances:  Allergies   Allergen Reactions    Aspirin Unknown    Penicillins Other and Unknown    Statins-Hmg-Coa Reductase Inhibitors Unknown     Outpatient Medication Profile:  Current Outpatient Medications   Medication Sig Dispense Refill    atorvastatin (Lipitor) 40 mg tablet TAKE 1 TABLET BY MOUTH AT  "BEDTIME 90 tablet 3    carvedilol (Coreg) 12.5 mg tablet Take 1 tablet (12.5 mg) by mouth 2 times a day after meals. 180 tablet 3    clopidogrel (Plavix) 75 mg tablet Take 1 tablet (75 mg) by mouth once daily. 90 tablet 3    cyanocobalamin, vitamin B-12, 1,000 mcg tablet, sublingual DISSOLVE 1 TABLET UNDER THE TONGUE ONCE A DAY      fexofenadine (Allegra) 180 mg tablet Take 1 tablet (180 mg) by mouth once daily.      Jardiance 25 mg Take 1 tablet (25 mg) by mouth once daily. 90 tablet 3    pedi multivit no.17 w-fluoride (Poly-Vi-Liane) 0.5 mg tablet,chewable Chew 1 tablet once daily.      sacubitriL-valsartan (Entresto) 49-51 mg tablet Take 1 tablet by mouth 2 times a day. 180 tablet 3    spironolactone (Aldactone) 25 mg tablet Take 1 tablet (25 mg) by mouth once daily. 30 tablet 11    tafamidis (Vyndamax) 61 mg capsule Take 1 capsule (61 mg) by mouth once daily. 30 capsule 10     No current facility-administered medications for this visit.        Vitals and Measurements:       8/16/2024     5:00 PM 8/16/2024     5:15 PM 8/16/2024     5:30 PM 8/16/2024     5:45 PM 10/2/2024    12:41 PM 10/3/2024     2:50 PM 10/9/2024    10:40 AM   Vitals   Systolic 102 102 93 93 130 152 149   Diastolic 53 53 54 54 62 68 75   Heart Rate 62 63 59 59 70 73 72   Temp       36.3 °C (97.3 °F)   Resp 17 15 18 15   16   Height (in)     1.676 m (5' 6\") 1.651 m (5' 5\") 1.615 m (5' 3.58\")    Weight (lb)     156.4 152 155.76   BMI     25.24 kg/m2 25.29 kg/m2 27.09 kg/m2   BSA (m2)     1.82 m2 1.78 m2 1.78 m2   Visit Report     Report Report Report       Significant value       Physical Exam:   Constitutional: alert, awake and oriented, not in acute distress   HEENT: moist mucous membranes, normal nose   Neck: supple, no lymphadenopathy   EYES: PERRL, EOM intact, conjunctiva normal  CV: normal rate, regular rhythm, no murmur   Pulmonary: normal effort, no wheezing, equal air entry   Abdominal: soft, non tender, non distended, no palpable " hepatosplenomegaly   : no CVA tenderness  Skin: no jaundice, rash or erythema  Neurological: AAOx3, no gross focal deficit   Psychiatric: normal mood and behavior   Lymph: no supraclavicular, axillary or inguinal LAD    Lab Results:  Admission on 08/16/2024, Discharged on 08/16/2024   Component Date Value Ref Range Status    POCT Activated Clotting Time Low R* 08/16/2024    Final    ABOVE LIMIT FOR DEVICE   Target ACT range will vary based on the patient population,   clinical status, and surgical intervention occurring.   Lab on 08/02/2024   Component Date Value Ref Range Status    Glucose 08/02/2024 120 (H)  74 - 99 mg/dL Final    Sodium 08/02/2024 139  136 - 145 mmol/L Final    Potassium 08/02/2024 4.1  3.5 - 5.3 mmol/L Final    Chloride 08/02/2024 106  98 - 107 mmol/L Final    Bicarbonate 08/02/2024 25  21 - 32 mmol/L Final    Anion Gap 08/02/2024 12  10 - 20 mmol/L Final    Urea Nitrogen 08/02/2024 20  6 - 23 mg/dL Final    Creatinine 08/02/2024 0.95  0.50 - 1.30 mg/dL Final    eGFR 08/02/2024 83  >60 mL/min/1.73m*2 Final    Calculations of estimated GFR are performed using the 2021 CKD-EPI Study Refit equation without the race variable for the IDMS-Traceable creatinine methods.  https://jasn.asnjournals.org/content/early/2021/09/22/ASN.7816515220    Calcium 08/02/2024 9.6  8.6 - 10.3 mg/dL Final    WBC 08/02/2024 5.5  4.4 - 11.3 x10*3/uL Final    nRBC 08/02/2024 0.0  0.0 - 0.0 /100 WBCs Final    RBC 08/02/2024 4.93  4.50 - 5.90 x10*6/uL Final    Hemoglobin 08/02/2024 14.6  13.5 - 17.5 g/dL Final    Hematocrit 08/02/2024 44.3  41.0 - 52.0 % Final    MCV 08/02/2024 90  80 - 100 fL Final    MCH 08/02/2024 29.6  26.0 - 34.0 pg Final    MCHC 08/02/2024 33.0  32.0 - 36.0 g/dL Final    RDW 08/02/2024 14.0  11.5 - 14.5 % Final    Platelets 08/02/2024 228  150 - 450 x10*3/uL Final   Lab on 07/19/2024   Component Date Value Ref Range Status    Total Protein, Urine Random 07/19/2024 <4 (L)  5 - 25 mg/dL Final     Albumin % 07/19/2024 37.2  % Final    Alpha 1 Globulin % 07/19/2024 7.5  % Final    Alpha 2 Globulin % 07/19/2024 10.8  % Final    Beta Globulin % 07/19/2024 24.3  % Final    Gamma Globulin % 07/19/2024 20.2  % Final    Urine Electrophoresis Comment 07/19/2024 Normal.      Final    Path Review-Urine Protein Electrop* 07/19/2024 Reviewed and approved by CONSTANCE ESCALANTE on 7/24/24 at 7:29 PM.       Final    Path Review - Urine Immunofixation 07/19/2024 Reviewed and approved by CONSTANCE ESCALANTE on 7/24/24 at 7:29 PM.       Final    Immunofixation Comment 07/19/2024 No monoclonal protein detected by immunofixation.   Final   Lab on 07/18/2024   Component Date Value Ref Range Status    Ig Kappa Free Light Chain 07/18/2024 3.05 (H)  0.33 - 1.94 mg/dL Final    Ig Lambda Free Light Chain 07/18/2024 1.97  0.57 - 2.63 mg/dL Final    Kappa/Lambda Ratio 07/18/2024 1.55  0.26 - 1.65 Final    Total Protein 07/18/2024 6.7  6.4 - 8.2 g/dL Final    Albumin 07/18/2024 4.0  3.4 - 5.0 g/dL Final    Alpha 1 Globulin 07/18/2024 0.3  0.2 - 0.6 g/dL Final    Alpha 2 Globulin 07/18/2024 0.7  0.4 - 1.1 g/dL Final    Beta Globulin 07/18/2024 0.8  0.5 - 1.2 g/dL Final    Gamma 07/18/2024 0.9  0.5 - 1.4 g/dL Final    Protein Electrophoresis Comment 07/18/2024 Normal.   Final    Path Review - Serum Protein Electr* 07/18/2024 Reviewed and approved by CONSTANCE ESCALANTE on 7/24/24 at 5:22 PM.       Final   Hospital Outpatient Visit on 07/11/2024   Component Date Value Ref Range Status    LVOT diam 07/11/2024 2.04  cm Final    LV Biplane EF 07/11/2024 58  % Final    MV E/A ratio 07/11/2024 0.57   Final    MV avg E/e' ratio 07/11/2024 10.93   Final    Tricuspid annular plane systolic e* 07/11/2024 2.2  cm Final    AV mn grad 07/11/2024 6.0  mmHg Final    LA vol index A/L 07/11/2024 34.8  ml/m2 Final    AV pk carlos 07/11/2024 1.69  m/s Final    LV EF 07/11/2024 43  % Final    RV free wall pk S' 07/11/2024 10.76  cm/s Final    LV GLS 07/11/2024 8.4  % Final     RVSP 07/11/2024 34.9  mmHg Final    LVIDd 07/11/2024 5.72  cm Final    Aortic Valve Area by Continuity of* 07/11/2024 3.06  cm2 Final    Aortic Valve Area by Continuity of* 07/11/2024 2.59  cm2 Final    AV pk grad 07/11/2024 11.5  mmHg Final    LV A4C EF 07/11/2024 56.9   Final   Lab on 06/25/2024   Component Date Value Ref Range Status    Glucose 06/25/2024 239 (H)  74 - 99 mg/dL Final    Sodium 06/25/2024 137  136 - 145 mmol/L Final    Potassium 06/25/2024 4.5  3.5 - 5.3 mmol/L Final    Chloride 06/25/2024 103  98 - 107 mmol/L Final    Bicarbonate 06/25/2024 27  21 - 32 mmol/L Final    Anion Gap 06/25/2024 12  10 - 20 mmol/L Final    Urea Nitrogen 06/25/2024 17  6 - 23 mg/dL Final    Creatinine 06/25/2024 0.83  0.50 - 1.30 mg/dL Final    eGFR 06/25/2024 >90  >60 mL/min/1.73m*2 Final    Calculations of estimated GFR are performed using the 2021 CKD-EPI Study Refit equation without the race variable for the IDMS-Traceable creatinine methods.  https://jasn.asnjournals.org/content/early/2021/09/22/ASN.0291059868    Calcium 06/25/2024 9.3  8.6 - 10.3 mg/dL Final    BNP 06/25/2024 380 (H)  0 - 99 pg/mL Final    Magnesium 06/25/2024 1.91  1.60 - 2.40 mg/dL Final    Thyroid Stimulating Hormone 06/25/2024 2.38  0.44 - 3.98 mIU/L Final   Ancillary Procedure on 06/24/2024   Component Date Value Ref Range Status    BSA 06/24/2024 1.81  m2 Final   Office Visit on 04/18/2024   Component Date Value Ref Range Status    POC HEMOGLOBIN A1c 04/18/2024 7.1 (A)  4.2 - 6.5 % Final     Assessment:    73 y.o. male with PMH s/f CAD, HFrEF newly diagnosed TTR amyloidosis referred by for elevated FLC.      Plan:    Reviewed and discussed lab, imaging, and pathology results with patient in detail as well as diagnosis, prognosis, and treatment options.    Discussed that low level elevation of FLC in the setting of normal SPEP/UPEP is likely reactive/inflammatory and not related to AL amyloidosis. Patient with no neurological symptoms. PYP  scan diagnostic for TTR amyloidosis and treatment is dictated by cardiology to prevent more TTR depositions. Patient anticipated to start Vyndamax tomorrow.    Planning on seeing genetics in February but will try to get earlier appointment.    Plan to repeat marker in 6 months    F/U w/PCP, cardio, and heart failure    RTC in 6 months    Patient verbalized understanding, and all his questions were answered to his satisfaction    60 min spent with patient greater than 50 % of which was spent in consultation and coordination of care.

## 2024-10-17 ENCOUNTER — LAB (OUTPATIENT)
Dept: LAB | Facility: LAB | Age: 74
End: 2024-10-17
Payer: MEDICARE

## 2024-10-17 DIAGNOSIS — R76.8 ELEVATED SERUM IMMUNOGLOBULIN FREE LIGHT CHAINS: ICD-10-CM

## 2024-10-17 DIAGNOSIS — D50.9 NORMOCYTIC HYPOCHROMIC ANEMIA: ICD-10-CM

## 2024-10-17 DIAGNOSIS — K90.9 IDIOPATHIC STEATORRHEA (HHS-HCC): ICD-10-CM

## 2024-10-17 LAB
ALBUMIN SERPL BCP-MCNC: 4.2 G/DL (ref 3.4–5)
ALP SERPL-CCNC: 81 U/L (ref 33–136)
ALT SERPL W P-5'-P-CCNC: 17 U/L (ref 10–52)
ANION GAP SERPL CALC-SCNC: 13 MMOL/L (ref 10–20)
AST SERPL W P-5'-P-CCNC: 18 U/L (ref 9–39)
BASOPHILS # BLD AUTO: 0.05 X10*3/UL (ref 0–0.1)
BASOPHILS NFR BLD AUTO: 1.1 %
BILIRUB SERPL-MCNC: 1.3 MG/DL (ref 0–1.2)
BUN SERPL-MCNC: 14 MG/DL (ref 6–23)
CALCIUM SERPL-MCNC: 9.4 MG/DL (ref 8.6–10.3)
CHLORIDE SERPL-SCNC: 104 MMOL/L (ref 98–107)
CO2 SERPL-SCNC: 27 MMOL/L (ref 21–32)
CREAT SERPL-MCNC: 0.98 MG/DL (ref 0.5–1.3)
EGFRCR SERPLBLD CKD-EPI 2021: 80 ML/MIN/1.73M*2
EOSINOPHIL # BLD AUTO: 0.18 X10*3/UL (ref 0–0.4)
EOSINOPHIL NFR BLD AUTO: 3.8 %
ERYTHROCYTE [DISTWIDTH] IN BLOOD BY AUTOMATED COUNT: 14 % (ref 11.5–14.5)
FERRITIN SERPL-MCNC: 284 NG/ML (ref 20–300)
FOLATE SERPL-MCNC: 11.6 NG/ML
GLUCOSE SERPL-MCNC: 129 MG/DL (ref 74–99)
HCT VFR BLD AUTO: 50.3 % (ref 41–52)
HGB BLD-MCNC: 15.9 G/DL (ref 13.5–17.5)
IGA SERPL-MCNC: 240 MG/DL (ref 70–400)
IGG SERPL-MCNC: 1010 MG/DL (ref 700–1600)
IGM SERPL-MCNC: 73 MG/DL (ref 40–230)
IMM GRANULOCYTES # BLD AUTO: 0.01 X10*3/UL (ref 0–0.5)
IMM GRANULOCYTES NFR BLD AUTO: 0.2 % (ref 0–0.9)
IRON SATN MFR SERPL: 21 % (ref 25–45)
IRON SERPL-MCNC: 70 UG/DL (ref 35–150)
LDH SERPL L TO P-CCNC: 145 U/L (ref 84–246)
LYMPHOCYTES # BLD AUTO: 1 X10*3/UL (ref 0.8–3)
LYMPHOCYTES NFR BLD AUTO: 21.2 %
MCH RBC QN AUTO: 29.6 PG (ref 26–34)
MCHC RBC AUTO-ENTMCNC: 31.6 G/DL (ref 32–36)
MCV RBC AUTO: 94 FL (ref 80–100)
MONOCYTES # BLD AUTO: 0.57 X10*3/UL (ref 0.05–0.8)
MONOCYTES NFR BLD AUTO: 12.1 %
NEUTROPHILS # BLD AUTO: 2.91 X10*3/UL (ref 1.6–5.5)
NEUTROPHILS NFR BLD AUTO: 61.6 %
NRBC BLD-RTO: 0 /100 WBCS (ref 0–0)
PLATELET # BLD AUTO: 220 X10*3/UL (ref 150–450)
POTASSIUM SERPL-SCNC: 5.1 MMOL/L (ref 3.5–5.3)
PROT SERPL-MCNC: 6.6 G/DL (ref 6.4–8.2)
PROT SERPL-MCNC: 6.8 G/DL (ref 6.4–8.2)
RBC # BLD AUTO: 5.38 X10*6/UL (ref 4.5–5.9)
SODIUM SERPL-SCNC: 139 MMOL/L (ref 136–145)
TIBC SERPL-MCNC: 339 UG/DL (ref 240–445)
UIBC SERPL-MCNC: 269 UG/DL (ref 110–370)
VIT B12 SERPL-MCNC: 807 PG/ML (ref 211–911)
WBC # BLD AUTO: 4.7 X10*3/UL (ref 4.4–11.3)

## 2024-10-17 PROCEDURE — 85025 COMPLETE CBC W/AUTO DIFF WBC: CPT

## 2024-10-17 PROCEDURE — 82746 ASSAY OF FOLIC ACID SERUM: CPT

## 2024-10-17 PROCEDURE — 80053 COMPREHEN METABOLIC PANEL: CPT

## 2024-10-17 PROCEDURE — 83615 LACTATE (LD) (LDH) ENZYME: CPT

## 2024-10-17 PROCEDURE — 86235 NUCLEAR ANTIGEN ANTIBODY: CPT

## 2024-10-17 PROCEDURE — 82728 ASSAY OF FERRITIN: CPT

## 2024-10-17 PROCEDURE — 83550 IRON BINDING TEST: CPT

## 2024-10-17 PROCEDURE — 86334 IMMUNOFIX E-PHORESIS SERUM: CPT

## 2024-10-17 PROCEDURE — 36415 COLL VENOUS BLD VENIPUNCTURE: CPT

## 2024-10-17 PROCEDURE — 86225 DNA ANTIBODY NATIVE: CPT

## 2024-10-17 PROCEDURE — 83540 ASSAY OF IRON: CPT

## 2024-10-17 PROCEDURE — 82784 ASSAY IGA/IGD/IGG/IGM EACH: CPT

## 2024-10-17 PROCEDURE — 84165 PROTEIN E-PHORESIS SERUM: CPT

## 2024-10-17 PROCEDURE — 82607 VITAMIN B-12: CPT

## 2024-10-17 PROCEDURE — 84155 ASSAY OF PROTEIN SERUM: CPT

## 2024-10-17 PROCEDURE — 86038 ANTINUCLEAR ANTIBODIES: CPT

## 2024-10-17 PROCEDURE — 83521 IG LIGHT CHAINS FREE EACH: CPT

## 2024-10-18 LAB
ANA PATTERN: ABNORMAL
ANA SER QL HEP2 SUBST: POSITIVE
ANA TITR SER IF: ABNORMAL {TITER}
CENTROMERE B AB SER-ACNC: <0.2 AI
CHROMATIN AB SERPL-ACNC: <0.2 AI
DSDNA AB SER-ACNC: 1 IU/ML
ENA JO1 AB SER QL IA: <0.2 AI
ENA RNP AB SER IA-ACNC: 0.3 AI
ENA SCL70 AB SER QL IA: <0.2 AI
ENA SM AB SER IA-ACNC: <0.2 AI
ENA SM+RNP AB SER QL IA: <0.2 AI
ENA SS-A AB SER IA-ACNC: 0.2 AI
ENA SS-B AB SER IA-ACNC: <0.2 AI
KAPPA LC SERPL-MCNC: 2.72 MG/DL (ref 0.33–1.94)
KAPPA LC/LAMBDA SER: 1.57 {RATIO} (ref 0.26–1.65)
LAMBDA LC SERPL-MCNC: 1.73 MG/DL (ref 0.57–2.63)
RIBOSOMAL P AB SER-ACNC: <0.2 AI

## 2024-10-21 ENCOUNTER — APPOINTMENT (OUTPATIENT)
Dept: PRIMARY CARE | Facility: CLINIC | Age: 74
End: 2024-10-21
Payer: MEDICARE

## 2024-10-21 ENCOUNTER — TELEPHONE (OUTPATIENT)
Dept: CARDIOLOGY | Facility: HOSPITAL | Age: 74
End: 2024-10-21

## 2024-10-21 VITALS
OXYGEN SATURATION: 98 % | HEIGHT: 64 IN | DIASTOLIC BLOOD PRESSURE: 76 MMHG | BODY MASS INDEX: 26.46 KG/M2 | HEART RATE: 44 BPM | TEMPERATURE: 97.6 F | SYSTOLIC BLOOD PRESSURE: 122 MMHG | WEIGHT: 155 LBS

## 2024-10-21 DIAGNOSIS — I50.1 LEFT VENTRICULAR FAILURE (MULTI): ICD-10-CM

## 2024-10-21 DIAGNOSIS — R39.16 BENIGN PROSTATIC HYPERPLASIA (BPH) WITH STRAINING ON URINATION: ICD-10-CM

## 2024-10-21 DIAGNOSIS — E53.8 VITAMIN B 12 DEFICIENCY: ICD-10-CM

## 2024-10-21 DIAGNOSIS — I10 BENIGN ESSENTIAL HYPERTENSION: ICD-10-CM

## 2024-10-21 DIAGNOSIS — E11.9 TYPE 2 DIABETES MELLITUS WITHOUT COMPLICATION, WITHOUT LONG-TERM CURRENT USE OF INSULIN (MULTI): ICD-10-CM

## 2024-10-21 DIAGNOSIS — I50.20 HFREF (HEART FAILURE WITH REDUCED EJECTION FRACTION): ICD-10-CM

## 2024-10-21 DIAGNOSIS — I25.10 CORONARY ARTERY DISEASE INVOLVING NATIVE HEART WITHOUT ANGINA PECTORIS, UNSPECIFIED VESSEL OR LESION TYPE: ICD-10-CM

## 2024-10-21 DIAGNOSIS — E78.00 PURE HYPERCHOLESTEROLEMIA: ICD-10-CM

## 2024-10-21 DIAGNOSIS — N40.1 BENIGN PROSTATIC HYPERPLASIA (BPH) WITH STRAINING ON URINATION: ICD-10-CM

## 2024-10-21 DIAGNOSIS — Z00.00 ANNUAL PHYSICAL EXAM: Primary | ICD-10-CM

## 2024-10-21 DIAGNOSIS — I25.5 ISCHEMIC CARDIOMYOPATHY: ICD-10-CM

## 2024-10-21 LAB — POC HEMOGLOBIN A1C: 6.7 % (ref 4.2–6.5)

## 2024-10-21 PROCEDURE — 1123F ACP DISCUSS/DSCN MKR DOCD: CPT | Performed by: FAMILY MEDICINE

## 2024-10-21 PROCEDURE — 83036 HEMOGLOBIN GLYCOSYLATED A1C: CPT | Performed by: FAMILY MEDICINE

## 2024-10-21 PROCEDURE — 99214 OFFICE O/P EST MOD 30 MIN: CPT | Performed by: FAMILY MEDICINE

## 2024-10-21 PROCEDURE — 3074F SYST BP LT 130 MM HG: CPT | Performed by: FAMILY MEDICINE

## 2024-10-21 PROCEDURE — 1036F TOBACCO NON-USER: CPT | Performed by: FAMILY MEDICINE

## 2024-10-21 PROCEDURE — 99397 PER PM REEVAL EST PAT 65+ YR: CPT | Performed by: FAMILY MEDICINE

## 2024-10-21 PROCEDURE — 3078F DIAST BP <80 MM HG: CPT | Performed by: FAMILY MEDICINE

## 2024-10-21 ASSESSMENT — PATIENT HEALTH QUESTIONNAIRE - PHQ9
SUM OF ALL RESPONSES TO PHQ9 QUESTIONS 1 AND 2: 0
1. LITTLE INTEREST OR PLEASURE IN DOING THINGS: NOT AT ALL
2. FEELING DOWN, DEPRESSED OR HOPELESS: NOT AT ALL

## 2024-10-21 NOTE — PROGRESS NOTES
Subjective   Patient ID: Tre Sumner is a 73 y.o. male who presents for Follow-up (Go over labs/Dr hernandez changed some medications and the heart specialist in [place]).              HPI  1.  Physical exam.  Colonoscopy: last done 2020, 5 year clearance  Immunizations: NA  Reviewed chronic medical conditions     Concerns today: none     In general the patient states that his health is:  good     Regular dental visits: Has dentures  Vision problems: has dry eyes and sees Dr Urena.  Eye exam 9/2023  Hearing loss:  Has hearing aides.       Diet: Eating less  Exercise:  He is physically active and goes to the gym  Weight concerns: no  Sleep: Sleep is good with benadryl  Caffeine: 2 cups coffee  Water: good intake     Tobacco use:no   Alcohol use:no  Illicit drug use:no     Colon cancer screening:  Last done 2020  Colonoscopy       2. 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 GERD  Worsening with new medications   Takes generic antacid, helps sometimes     4. Diabetes   POCT HbA1c 6.8%      Review of Systems  All pertinent positive symptoms are included in the history of present illness.    All other systems have been reviewed and are negative and noncontributory to this patient's current ailments.    Past Medical History:   Diagnosis Date    Abnormal electrocardiogram (ECG) (EKG)     Abnormal EKG    Encounter for screening for malignant neoplasm of prostate 02/16/2021    Encounter for prostate cancer screening    Other conditions influencing health status 01/05/2022    History of cough    Personal history of other diseases of the musculoskeletal system and connective tissue     History of osteoarthritis     Past Surgical History:   Procedure Laterality Date    CARDIAC CATHETERIZATION N/A 8/16/2024    Procedure: Left Heart Cath;  Surgeon: Reyes Cabrera MD;  Location: [facility] Cardiac Cath Lab;  Service:  Cardiovascular;  Laterality: N/A;    CARDIAC CATHETERIZATION N/A 2024    Procedure: FFR (Fractional Flow [place]);  Surgeon: Reyes Cabrera MD;  Location: [facility] Cardiac Cath [facility];  Service: Cardiovascular;  Laterality: N/A;    HEMORRHOID SURGERY  2016    Hemorrhoidectomy    OTHER SURGICAL HISTORY  2016    Hip Replacement Left    OTHER SURGICAL HISTORY  2016    Hip Joint Arthrodesis Right    SHOULDER SURGERY  2016    Shoulder Surgery     Social History     Tobacco Use    Smoking status: Former     Current packs/day: 0.00     Types: Cigarettes     Quit date:      Years since quittin.8    Smokeless tobacco: Never   Substance Use Topics    Alcohol use: Never    Drug use: Never     No family history on file.  Immunization History   Administered Date(s) Administered    Pneumococcal polysaccharide vaccine, 23-valent, age 2 years and older (PNEUMOVAX 23) 10/19/2021     Current Outpatient Medications   Medication Instructions    atorvastatin (LIPITOR) 40 mg, oral, Nightly    carvedilol (COREG) 12.5 mg, oral, 2 times daily after meals    clopidogrel (PLAVIX) 75 mg, oral, Daily    cyanocobalamin, vitamin B-12, 1,000 mcg tablet, sublingual DISSOLVE 1 TABLET UNDER THE TONGUE ONCE A DAY    fexofenadine (Allegra) 180 mg tablet 1 tablet, Daily    Jardiance 25 mg, oral, Daily    pedi multivit no.17 w-fluoride (Poly-Vi-Liane) 0.5 mg tablet,chewable 1 tablet, Daily    sacubitriL-valsartan (Entresto) 49-51 mg tablet 1 tablet, oral, 2 times daily    spironolactone (ALDACTONE) 25 mg, oral, Daily    tafamidis (VYNDAMAX) 61 mg, oral, Daily     Allergies   Allergen Reactions    Aspirin Unknown    Penicillins Other and Unknown    Statins-Hmg-Coa Reductase Inhibitors Unknown       Objective   Vitals:    10/21/24 1403   BP: 122/76   BP Location: Right arm   Patient Position: Sitting   BP Cuff Size: Adult   Pulse: (!) 44   Temp: 36.4 °C (97.6 °F)   SpO2: 98%   Weight: 70.3 kg (155 lb)   Height: 1.615 m (5'  "3.58\")     Body mass index is 26.96 kg/m².    BP Readings from Last 3 Encounters:   10/21/24 122/76   10/09/24 149/75   10/03/24 152/68      Wt Readings from Last 3 Encounters:   10/21/24 70.3 kg (155 lb)   10/09/24 70.7 kg (155 lb 12.1 oz)   10/03/24 68.9 kg (152 lb)        Office Visit on 10/21/2024   Component Date Value    POC HEMOGLOBIN A1c 10/21/2024 6.7 (A)    Lab on 10/17/2024   Component Date Value    Iron 10/17/2024 70     UIBC 10/17/2024 269     TIBC 10/17/2024 339     % Saturation 10/17/2024 21 (L)     LDH 10/17/2024 145     Ferritin 10/17/2024 284     Vitamin B12 10/17/2024 807     Folate, Serum 10/17/2024 11.6     Ig [place] Free Light Chain 10/17/2024 2.72 (H)     Ig Lambda Free Light Guerita* 10/17/2024 1.73     Kappa/Lambda Ratio 10/17/2024 1.57     IgG 10/17/2024 1,010     IgA 10/17/2024 240     IgM 10/17/2024 73     WBC 10/17/2024 4.7     nRBC 10/17/2024 0.0     RBC 10/17/2024 5.38     Hemoglobin 10/17/2024 15.9     Hematocrit 10/17/2024 50.3     MCV 10/17/2024 94     MCH 10/17/2024 29.6     MCHC 10/17/2024 31.6 (L)     RDW 10/17/2024 14.0     Platelets 10/17/2024 220     Neutrophils % 10/17/2024 61.6     Immature Granulocytes %,* 10/17/2024 0.2     Lymphocytes % 10/17/2024 21.2     Monocytes % 10/17/2024 12.1     Eosinophils % 10/17/2024 3.8     Basophils % 10/17/2024 1.1     Neutrophils Absolute 10/17/2024 2.91     Immature Granulocytes Ab* 10/17/2024 0.01     Lymphocytes Absolute 10/17/2024 1.00     Monocytes Absolute 10/17/2024 0.57     Eosinophils Absolute 10/17/2024 0.18     Basophils Absolute 10/17/2024 0.05     Glucose 10/17/2024 129 (H)     Sodium 10/17/2024 139     Potassium 10/17/2024 5.1     Chloride 10/17/2024 104     Bicarbonate 10/17/2024 27     Anion Gap 10/17/2024 13     Urea Nitrogen 10/17/2024 14     Creatinine 10/17/2024 0.98     eGFR 10/17/2024 80     Calcium 10/17/2024 9.4     Albumin 10/17/2024 4.2     Alkaline Phosphatase 10/17/2024 81     Total Protein 10/17/2024 6.6     AST " 10/17/2024 18     Bilirubin, Total 10/17/2024 1.3 (H)     ALT 10/17/2024 17     GRICELDA 10/17/2024 Positive (A)     GRICELDA Pattern 10/17/2024 Homogeneous     GRICELDA Titer 10/17/2024 1:320     Total Protein 10/17/2024 6.8     Anti-SM 10/17/2024 <0.2     Anti-RNP 10/17/2024 0.3     Anti-SM/RNP 10/17/2024 <0.2     Anti-SSA 10/17/2024 0.2     Anti-SSB 10/17/2024 <0.2     Anti-SCL-70 10/17/2024 <0.2     Anti-CUONG-1 IgG 10/17/2024 <0.2     Anti-Chromatin 10/17/2024 <0.2     Anti-Centromere 10/17/2024 <0.2     ANTI-RIBOSOMAL P 10/17/2024 <0.2     Anti-DNA (DS) 10/17/2024 1.0      Physical Exam  General: Pt is sitting up in chair. Appears well-nourished. In no acute distress.  HENT: TM clear bilaterally. Oropharynx without erythema or exudate.  Neck: No cervical lymphadenopathy. No thyromegaly. No carotid bruits.  CV: S1S2 normal. Regular rate and rhythm. No murmurs, rubs, or gallops.  Resp: Clear to auscultation in all lobes. Good aeration. No rales, rhonchi, or wheezes.  GI: Soft, no tenderness to palpation. No organomegaly. No abdominal bruits.   Extremities: No lower extremity edema bilaterally.   Skin: Warm and dry. No rashes or bruising.   Psych: Appropriate responses and actions.      Assessment/Plan   Problem List Items Addressed This Visit       CAD (coronary artery disease)    Benign essential hypertension    BPH (benign prostatic hyperplasia)    Cardiomyopathy    Diabetes mellitus, type II (Multi)    Relevant Orders    POCT glycosylated hemoglobin (Hb A1C) manually resulted (Completed)    Hyperlipidemia    Left ventricular failure (Multi)    Vitamin B 12 deficiency    HFrEF (heart failure with reduced ejection fraction)     Other Visit Diagnoses       Annual physical exam    -  Primary        The patient is a 74 y/o male with multiple ,medical problems  Reveiwed preventive screenings    Diabetes mellitus: Controlled  - continue Jardiance 25 mg 1/2 tab daily, Hemoglobin A1c 6.7  - Microalbumin was normal April 18, 2023            -   Eye exam March 2024  -On a statin and on angiotensin receptor blocker  - Denies any cut scrapes or sores on the feet or any burning numbness tingling in the hands of the feet     Hypertension- stable  -Continue medication losartan HCTZ 100/12.5 mg daily and Carvedilol 12.5 mg BID.      Hyperlipidemia- stable  - Atorvastatin 40 mg daily currently taken and tolerated  -Continue CoQ10 200 mg              BPH and elevated PSA- stable  - Have had patient see Urology Dr Joseph.  Is supposed to follow up   -  Patient will need to follow up for possible procedure             Seborrheic Keratosis right elbow.               TTR amyloidosis            - On Vyndamax             Heart failure rEF           - Stable on current meds       Follow up in 6 months or as needed

## 2024-10-21 NOTE — TELEPHONE ENCOUNTER
Spoke with patient re: recent diagnosis of amyloidosis.  Vyndamax already received.  Scheduled to see genetics in December.

## 2024-10-21 NOTE — TELEPHONE ENCOUNTER
----- Message from Nurse Yakelin BRUNER sent at 10/8/2024  1:09 PM EDT -----  Regarding: FW: Vyndamax Approval  ----- Message -----  From: Ruth Jordan  Sent: 10/8/2024  12:52 PM EDT  To: [facility] Clinical Support Staff  Subject: Vyndamax Approval                                Good Afternoon,    Coverage has been approved for this patient's Vyndamax medication from 7/8/24 to 10/7/25 with case # [id number].  The approval notice from the insurance plan is scanned into the media section of the patients chart for your reference. Patient has been enrolled in assistance for copay with [employer]. We will contact the patient to schedule delivery of this medication.    Thank you,  Ruth Jordan  Patient Support Advocate   Specialty Pharmacy  Ph: [phone]  F: [phone]

## 2024-10-23 ENCOUNTER — APPOINTMENT (OUTPATIENT)
Dept: PHARMACY | Facility: HOSPITAL | Age: 74
End: 2024-10-23
Payer: MEDICARE

## 2024-10-23 DIAGNOSIS — E85.4 CARDIAC AMYLOIDOSIS: ICD-10-CM

## 2024-10-23 DIAGNOSIS — I43 CARDIAC AMYLOIDOSIS: ICD-10-CM

## 2024-10-23 DIAGNOSIS — I50.20 HFREF (HEART FAILURE WITH REDUCED EJECTION FRACTION): ICD-10-CM

## 2024-10-23 LAB
ALBUMIN: 4.1 G/DL (ref 3.4–5)
ALPHA 1 GLOBULIN: 0.3 G/DL (ref 0.2–0.6)
ALPHA 2 GLOBULIN: 0.7 G/DL (ref 0.4–1.1)
BETA GLOBULIN: 0.8 G/DL (ref 0.5–1.2)
GAMMA GLOBULIN: 1 G/DL (ref 0.5–1.4)
IMMUNOFIXATION COMMENT: NORMAL
PATH REVIEW - SERUM IMMUNOFIXATION: NORMAL
PATH REVIEW-SERUM PROTEIN ELECTROPHORESIS: NORMAL
PROTEIN ELECTROPHORESIS COMMENT: NORMAL

## 2024-10-23 NOTE — PROGRESS NOTES
Pharmacist Clinic: Cardiology Management    Tre Sumner is a 73 y.o. male was referred to Clinical Pharmacy Team for heart failure management.     Referring Provider: Karen Hammer MD*    THIS IS A NEW PATIENT APPOINTMENT. PATIENT WILL BE ESTABLISHING CARE WITH CLINICAL PHARMACY.    Appointment was completed by Tre who was reached at primary number.    Allergies Reviewed? Yes    Allergies   Allergen Reactions    Aspirin Unknown    Penicillins Other and Unknown    Statins-Hmg-Coa Reductase Inhibitors Unknown       Past Medical History:   Diagnosis Date    Abnormal electrocardiogram (ECG) (EKG)     Abnormal EKG    Encounter for screening for malignant neoplasm of prostate 02/16/2021    Encounter for prostate cancer screening    Other conditions influencing health status 01/05/2022    History of cough    Personal history of other diseases of the musculoskeletal system and connective tissue     History of osteoarthritis       Current Outpatient Medications on File Prior to Visit   Medication Sig Dispense Refill    atorvastatin (Lipitor) 40 mg tablet TAKE 1 TABLET BY MOUTH AT BEDTIME 90 tablet 3    carvedilol (Coreg) 12.5 mg tablet Take 1 tablet (12.5 mg) by mouth 2 times a day after meals. 180 tablet 3    clopidogrel (Plavix) 75 mg tablet Take 1 tablet (75 mg) by mouth once daily. 90 tablet 3    cyanocobalamin, vitamin B-12, 1,000 mcg tablet, sublingual DISSOLVE 1 TABLET UNDER THE TONGUE ONCE A DAY      fexofenadine (Allegra) 180 mg tablet Take 1 tablet (180 mg) by mouth once daily. (Patient taking differently: Take 1 tablet (180 mg) by mouth once daily. As needed)      Jardiance 25 mg Take 1 tablet (25 mg) by mouth once daily. (Patient taking differently: Take 1 tablet (25 mg) by mouth once daily. Patient takes 12.5mg daily) 90 tablet 3    sacubitriL-valsartan (Entresto) 49-51 mg tablet Take 1 tablet by mouth 2 times a day. 180 tablet 3    spironolactone (Aldactone) 25 mg tablet Take 1 tablet (25 mg)  "by mouth once daily. 30 tablet 11    tafamidis (Vyndamax) 61 mg capsule Take 1 capsule (61 mg) by mouth once daily. 30 capsule 10    pedi multivit no.17 w-fluoride (Poly-Vi-Liane) 0.5 mg tablet,chewable Chew 1 tablet once daily. (Patient not taking: Reported on 10/23/2024)       No current facility-administered medications on file prior to visit.         RELEVANT LAB RESULTS  Lab Results   Component Value Date    BILITOT 1.3 (H) 10/17/2024    CALCIUM 9.4 10/17/2024    CO2 27 10/17/2024     10/17/2024    CREATININE 0.98 10/17/2024    GLUCOSE 129 (H) 10/17/2024    ALKPHOS 81 10/17/2024    K 5.1 10/17/2024    PROT 6.6 10/17/2024    PROT 6.8 10/17/2024     10/17/2024    AST 18 10/17/2024    ALT 17 10/17/2024    BUN 14 10/17/2024    ANIONGAP 13 10/17/2024    MG 1.91 06/25/2024     10/17/2024    ALBUMIN 4.2 10/17/2024    GFRMALE 76 12/05/2022     Lab Results   Component Value Date    TRIG 110 12/05/2023    CHOL 132 12/05/2023    LDLCALC 67 12/05/2023    HDL 43.4 12/05/2023     No results found for: \"BMCBC\", \"CBCDIF\"     PHARMACEUTICAL ASSESSMENT    MEDICATION RECONCILIATION    [place] Pharm[place] Reviewed? Yes, [place] [facility] [place]    Changed:  - Jardiance 12.5mg daily      Drug Interactions? Yes, describe: Entresto, Spironolactone- increased risk of hyperkalemia, continue to monitor    Medication Documentation Review Audit       Reviewed by Magdalene Rojas, PharmD (Pharmacist) on 10/10/24 at 1234      Medication Order Taking? Sig Documenting Provider Last Dose Status   atorvastatin (Lipitor) 40 mg tablet [id number] Yes TAKE 1 TABLET BY MOUTH AT BEDTIME Ruth Willoughby APRN-CNP Taking Active   carvedilol (Coreg) 12.5 mg tablet [id number] Yes Take 1 tablet (12.5 mg) by mouth 2 times a day after meals. David Ward, DO Taking Active   clopidogrel (Plavix) 75 mg tablet [id number] Yes Take 1 tablet (75 mg) by mouth once daily. David Ward, DO Taking Active   cyanocobalamin, vitamin B-12, 1,000 mcg " tablet, sublingual [id number] Yes DISSOLVE 1 TABLET UNDER THE TONGUE ONCE A DAY Historical Provider, MD Taking Active   fexofenadine (Allegra) 180 mg tablet [id number] Yes Take 1 tablet (180 mg) by mouth once daily. Historical Provider, MD Taking Active   Jardiance 25 mg [id number] Yes Take 1 tablet (25 mg) by mouth once daily. David Ward, DO Taking Active   pedi multivit no.17 w-fluoride (Poly-Vi-Liane) 0.5 mg tablet,chewable [id number] Yes Chew 1 tablet once daily. Historical Provider, MD Taking Active   sacubitriL-valsartan (Entresto) 49-51 mg tablet [id number] Yes Take 1 tablet by mouth 2 times a day. Tila Fagan DO Taking Active   spironolactone (Aldactone) 25 mg tablet [id number] Yes Take 1 tablet (25 mg) by mouth once daily. Karen Hammer MD PhD Taking Active   tafamidis (Vyndamax) 61 mg capsule [id number] Yes Take 1 capsule (61 mg) by mouth once daily. Karen Hammer MD PhD Taking Active                    DISEASE MANAGEMENT ASSESSMENT:     CHF ASSESSMENT     Symptom/Staging:  -Most recent ejection fraction: 40-45%    Results for orders placed during the hospital encounter of 07/11/24    Transthoracic Echo ([place] [address]  Phone [phone] Fax [phone]    TRANSTHORACIC ECHOCARDIOGRAM REPORT    Patient Name:      MAIKEL Ignacio Physician:    [id number] Tila Fagan DO  Study Date:        7/11/2024            Ordering Provider:    [id number] TILA FAGAN  MRN/PID:           [id number]             Fellow:  Accession#:        [id number]         Nurse:  Date of Birth/Age: 1950 / 73 years Sonographer:          Shruti De Oliveira RDCS  Gender:            M                    Additional Staff:  Height:            167.64 cm            Admit Date:           7/11/2024  Weight:            70.31 kg             Admission Status:     Outpatient  BSA / BMI:         1.79 m2 / 25.02      Department Location:  [facility] Echo  kg/m2                                       Lab  Blood Pressure: 162 /84 mmHg    Study Type:    TRANSTHORACIC ECHO (TTE) COMPLETE  Diagnosis/ICD: Ventricular premature depolarization-I49.3; Atherosclerotic heart  disease of native coronary artery without angina pectoris-I25.10;  Unspecified systolic (congestive) heart failure (CHF)-I50.20;  Essential (primary) hypertension-I10  Indication:    Abnormal EKG, CAD, HTN  CPT Codes:     Echo Complete w Full Doppler-[id number]  Study Detail: The following Echo studies were performed: 2D, M-Mode, Doppler and  color flow. Patient's heart rhythm is with premature ventricular  contractions.      PHYSICIAN INTERPRETATION:  Left Ventricle: The left ventricular systolic function is mildly decreased, with a visually estimated ejection fraction of 40-45%. There is global hypokinesis of the left ventricle with minor regional variations. The left ventricular cavity size is mildly dilated. The left ventricular septal wall thickness is moderately increased. There is mildly increased left ventricular posterior wall thickness. Left Ventricular Global Longitudinal Strain - 8.4 %. Spectral Doppler shows an impaired relaxation pattern of left ventricular diastolic filling. Reduced LV strain at -8.4% with apical sparing pattern which can be seen in restrictive cardiomyopathies such as cardiac amyloidosis.  Left Atrium: The left atrium is normal in size.  Right Ventricle: The right ventricle is normal in size. There is normal right ventricular global systolic function.  Right Atrium: The right atrium is normal in size.  Aortic Valve: The aortic valve is probably trileaflet. There is mild aortic valve thickening. The aortic valve dimensionless index is 0.93. There is mild aortic valve regurgitation. The peak instantaneous gradient of the aortic valve is 11.5 mmHg. The mean gradient of the aortic valve is 6.0 mmHg.  Mitral Valve: The mitral valve is mildly thickened. There is mild mitral annular  calcification. There is mild to moderate mitral valve regurgitation.  Tricuspid Valve: The tricuspid valve is structurally normal. There is mild tricuspid regurgitation. The Doppler estimated RVSP is slightly elevated at 34.9 mmHg.  Pulmonic Valve: The pulmonic valve is structurally normal. There is physiologic pulmonic valve regurgitation.  Pericardium: There is no pericardial effusion noted.  Aorta: The aortic root is normal.      CONCLUSIONS:  1. The left ventricular systolic function is mildly decreased, with a visually estimated ejection fraction of 40-45%.  2. There is global hypokinesis of the left ventricle with minor regional variations.  3. Spectral Doppler shows an impaired relaxation pattern of left ventricular diastolic filling.  4. Left ventricular cavity size is mildly dilated.  5. Moderately increased left ventricular septal thickness.  6. Reduced LV strain at -8.4% with apical sparing pattern which can be seen in restrictive cardiomyopathies such as cardiac amyloidosis.  7. There is normal right ventricular global systolic function.  8. Mild to moderate mitral valve regurgitation.  9. Slightly elevated RVSP.  10. Mild aortic valve regurgitation.    QUANTITATIVE DATA SUMMARY:  2D MEASUREMENTS:  Normal Ranges:  IVSd:          1.61 cm    (0.6-1.1cm)  LVPWd:         1.16 cm    (0.6-1.1cm)  LVIDd:         5.72 cm    (3.9-5.9cm)  LVIDs:         4.74 cm  LV Mass Index: 197.8 g/m2  LV % FS        17.2 %    LA VOLUME:  Normal Ranges:  LA Vol A4C:        59.1 ml    (22+/-6mL/m2)  LA Vol A2C:        62.5 ml  LA Vol BP:         62.5 ml  LA Vol Index A4C:  32.9 ml/m2  LA Vol Index A2C:  34.9 ml/m2  LA Vol Index BP:   34.8 ml/m2  LA Area A4C:       19.2 cm2  LA Area A2C:       20.3 cm2  LA Major Axis A4C: 5.3 cm  LA Major Axis A2C: 5.6 cm  LA Volume Index:   32.2 ml/m2  LA Vol A4C:        55.4 ml  LA Vol A2C:        59.4 ml    RA VOLUME BY A/L METHOD:  Normal Ranges:  RA Area A4C: 17.0 cm2    M-MODE  MEASUREMENTS:  Normal Ranges:  Ao Root: 3.40 cm (2.0-3.7cm)  AoV Exc: 2.00 cm (1.5-2.5cm)    AORTA MEASUREMENTS:  Normal Ranges:  AoV Exc:     2.00 cm (1.5-2.5cm)  Ao Sinus, d: 2.90 cm (2.1-3.5cm)  Ao STJ, d:   2.30 cm (1.7-3.4cm)  Asc Ao, d:   3.30 cm (2.1-3.4cm)    LV SYSTOLIC FUNCTION BY 2D PLANIMETRY (MOD):  Normal Ranges:  EF-A4C View:                      57 % (>=55%)  EF-A2C View:                      59 %  EF-Biplane:                       58 %  EF-Visual:                        43 %  LV EF Reported:                   43 %  Global Longitudinal Strain (GLS): 8 %    LV DIASTOLIC FUNCTION:  Normal Ranges:  MV Peak E:    0.60 m/s    (0.7-1.2 m/s)  MV Peak A:    1.05 m/s    (0.42-0.7 m/s)  E/A Ratio:    0.57        (1.0-2.2)  MV e'         0.056 m/s   (>8.0)  MV lateral e' 0.06 m/s  MV medial e'  0.05 m/s  MV A Dur:     171.27 msec  E/e' Ratio:   10.68       (<8.0)    MITRAL VALVE:  Normal Ranges:  MV DT: 259 msec (150-240msec)    AORTIC VALVE:  Normal Ranges:  AoV Vmax:                1.69 m/s  (<=1.7m/s)  AoV Peak P.5 mmHg (<20mmHg)  AoV Mean P.0 mmHg  (1.7-11.5mmHg)  LVOT Max Elder:            1.34 m/s  (<=1.1m/s)  AoV VTI:                 30.24 cm  (18-25cm)  LVOT VTI:                28.22 cm  LVOT Diameter:           2.04 cm   (1.8-2.4cm)  AoV Area, VTI:           3.06 cm2  (2.5-5.5cm2)  AoV Area,Vmax:           2.59 cm2  (2.5-4.5cm2)  AoV Dimensionless Index: 0.93    AORTIC INSUFFICIENCY:  AI Vmax:       3.76 m/s  AI Half-time:  819 msec  AI Decel Time: 2823 msec  AI Decel Rate: 133.03 cm/s2      RIGHT VENTRICLE:  RV Basal 3.59 cm  RV Mid   3.10 cm  RV Major 6.4 cm  TAPSE:   22.1 mm  RV s'    0.11 m/s    TRICUSPID VALVE/RVSP:  Normal Ranges:  Peak TR Velocity: 2.83 m/s  RV Syst Pressure: 34.9 mmHg (< 30mmHg)  TV e'             0.1 m/s    AORTA:  Asc Ao Diam 3.26 cm      [id number] Albaro Ovalles DO  Electronically signed on 2024 at 3:25:44 PM        ** Final  **      Guideline-Directed Medical Therapy:  -ARNI: Yes, describe: Entresto 49/51mg twice daily  -Beta Blocker: Yes, describe: Carvedilol 12.5mg twice daily  -MRA: Yes, describe: Spironolactone 25mg daily  -SGLT2i: Yes, describe: Jardiance 25mg daily- patient is taking 0.5 tablets (12.5mg) daily    Secondary Prevention:  -The 10-year ASCVD risk score (Erica MARQUEZ, et al., 2019) is: 41.6%    Values used to calculate the score:      Age: 73 years      Sex: Male      Is Non- : No      Diabetic: Yes      Tobacco smoker: No      Systolic Blood Pressure: 122 mmHg      Is BP treated: Yes      HDL Cholesterol: 43.4 mg/dL      Total Cholesterol: 132 mg/dL   -Aspirin 81mg? no  -Statin?: Yes, describe: Atorvastatin 40mg daily  -HTN?: Yes, describe: controlled at last OV    CURRENT PHARMACOTHERAPY:   Entresto 49/51mg twice daily  Carvedilol 12.5mg twice daily  Spironolactone 25mg daily  Jardiance 12.5mg daily  Vyndamax 61mg daily    Affordability:  PAP screen  Adherence/Compliance: reports adherence; patient uses pillbox  Adverse Effects: Patient reports dizziness since starting Entresto and spironolactone; he reports this is not bothersome to where it is hindering his daily activities    Monitoring Weights at Home: Yes; couple times per week  Home Weight Recordings: 155lbs- denies weight fluctuation    Past In Office Weight Readings:   Wt Readings from Last 6 Encounters:   10/21/24 70.3 kg (155 lb)   10/09/24 70.7 kg (155 lb 12.1 oz)   10/03/24 68.9 kg (152 lb)   10/02/24 70.9 kg (156 lb 6.4 oz)   07/29/24 69.6 kg (153 lb 7 oz)   06/24/24 70.3 kg (155 lb)       Monitoring Blood Pressure at Home: Yes; three times daily since starting Entresto and Spironoalactone  Home BP Recordings: average 130/70s    Past In Office BP Readings:   BP Readings from Last 6 Encounters:   10/21/24 122/76   10/09/24 149/75   10/03/24 152/68   10/02/24 130/62   08/16/24 93/54   07/29/24 143/61       HEALTH  MANAGEMENT    Maintaining fluid restriction (<2 L/day): N/A  Edema/swelling: No  Shortness of breath: Yes; occasionally when he gets in a hurry  Trouble sleeping/lying down: No  Dry/hacking cough: Yes- hoarseness in the mornings  Recent Hospitalizations: No    EDUCATION/COUNSELING:   - Counseled patient on MOA, expectations, duration of therapy, contraindications, administration, and monitoring parameters  - Counseled patient on lifestyle modifications that can decrease your risk of having complications (smoking cessation, losing weight, daily weights, vaccines)  - Counseled patient on fluid intake and weight management. Recommended to not consume more than 2 liters of fliuids per day. If they have gained more than 2-3 pounds within a 24 hours period (or 5 pounds in a week), contact their cardiologist  - Answered all patient questions and concerns       DISCUSSION/NOTES:   Today's initial visit was conducted with Tre to establish with clinical pharmacy. Patient allergies and medications were reviewed and updated. Patient reports he is splitting Jardiance 25mg tablets in half and taking once daily. Patient reports provider was agreeable splitting tablets in half to make supply last longer.   Patient confirms he started on Entresto and spironolactone about 3 weeks ago. Patient endorses increased dizziness daily since starting medications, he reports it is not hindering him from performing daily activities but is noticeable. He has been measuring his BP three times daily, stating it averages about 130/70s. Patient reports occasional SOB when he gets in a hurry but this resolves within a few minutes, he also walks about 1.5-2 miles daily. Patient reports since starting spironolactone and entresto his has noticed his voice is hoarse in the mornings. Patient denies all other s/s of heart failure.  Patient had labs drawn about 2 weeks after initiation of spironolactone/ Entresto, potassium was 5.1 (previously 4.1  8/2/24). Due to potassium increasing >5 mEq/L plan to decrease spironolactone to 12.5mg daily. Patient instructed to get labs redrawn prior to next follow up. Patient verbalizes understanding.  Patient was screened for  PAP. Patient reports paying $126/90 day supply for both Entresto and Jardiance. Patient reports he fills Vyndamax through  [facility] pharmacy and has a adamaris for this medication through [employer]. Plan to submit application once income documents are received.     ASSESSMENT AND PLAN:     Patient Assistance Program (PAP)    Application for program to be submitted for the following medications: Entresto, Jardiance    County of Permanent Address: [place]   Prescription Insurance:   Yes   Members of Household: 2   Files Taxes: Yes       Patient will be email financial information to pharmacist directly at [email or web address].    Patient verbally reports monthly or yearly income which is less than 400% federal poverty level    Patient aware this process may take up to 6 weeks.     If approved medication must [facility] and MEDICATION WILL BE MAILED TO PATIENT.         Assessment/Plan   Problem List Items Addressed This Visit       HFrEF (heart failure with reduced ejection fraction)     Patient is currently on 4 of 4 GDMT medications. Potassium is elevated at 5.1 since starting Entresto and spironolactone, plan to decrease spironolactone from 25mg daily to spironolactone 12.5mg daily and continue other medications as prescribed.     Reported BP readings average ~130/70s  Labs (10/17/2024): K-5.1, eGFR-80, Scr-0.98         Relevant Orders    Referral to Clinical Pharmacy     Other Visit Diagnoses       Cardiac amyloidosis                  RECOMMENDATIONS/PLAN    START  Spironolactone 12.5mg daily  STOP  Spironolactone 25mg daily  CONTINUE  Entresto 49/51mg twice daily  Carvedilol 12.5mg twice daily  Jardiance 12.5mg daily  Vyndamax 61mg daily    Last  Appnt with Referring Provider: 10/03/2024  Next Appnt with Referring Provider: 04/18/2025  Clinical Pharmacist follow up: 1 month  VAF/Application Expiration: pending  Type of Encounter: Adamaris Rowell PharmD    Verbal consent to manage patient's drug therapy was obtained from the patient . They were informed they may decline to participate or withdraw from participation in pharmacy services at any time.    Continue all meds under the continuation of care with the referring provider and clinical pharmacy team.

## 2024-10-23 NOTE — ASSESSMENT & PLAN NOTE
Patient is currently on 4 of 4 GDMT medications. Potassium is elevated at 5.1 since starting Entresto and spironolactone, plan to decrease spironolactone from 25mg daily to spironolactone 12.5mg daily and continue other medications as prescribed.     Reported BP readings average ~130/70s  Labs (10/17/2024): K-5.1, eGFR-80, Scr-0.98

## 2024-10-23 NOTE — Clinical Note
Radha Hammer,  Today I spoke with Tre about his heart failure medications and our cost assistance program. Tre confirms starting spironolactone and Entresto ~3 weeks ago, he reports increased dizziness daily and his BP averages ~130/70s. Labs were obtained 2 weeks after initiation, potassium is elevated at 5.1 (previous K-4.1 in 08/2024). Due to potassium increasing >5 mEq/L plan to decrease spironolactone to 12.5mg daily. Patient instructed to get labs redrawn prior to next follow up in 1 month. Additionally, patient should qualify for  PAP, plan to submit application once income documentation is received. Thank you!

## 2024-10-24 ENCOUNTER — OFFICE VISIT (OUTPATIENT)
Dept: CARDIOLOGY | Facility: HOSPITAL | Age: 74
End: 2024-10-24
Payer: MEDICARE

## 2024-10-24 VITALS
WEIGHT: 153.66 LBS | SYSTOLIC BLOOD PRESSURE: 171 MMHG | HEART RATE: 52 BPM | DIASTOLIC BLOOD PRESSURE: 71 MMHG | BODY MASS INDEX: 26.73 KG/M2 | OXYGEN SATURATION: 99 %

## 2024-10-24 DIAGNOSIS — R53.83 TIREDNESS: ICD-10-CM

## 2024-10-24 DIAGNOSIS — R07.89 CHEST HEAVINESS: ICD-10-CM

## 2024-10-24 DIAGNOSIS — I42.9 CARDIOMYOPATHY, UNSPECIFIED TYPE (MULTI): Primary | ICD-10-CM

## 2024-10-24 DIAGNOSIS — E85.4 CARDIAC AMYLOIDOSIS: ICD-10-CM

## 2024-10-24 DIAGNOSIS — I43 CARDIAC AMYLOIDOSIS: ICD-10-CM

## 2024-10-24 DIAGNOSIS — I49.3 FREQUENT PVCS: ICD-10-CM

## 2024-10-24 DIAGNOSIS — I44.7 LEFT BUNDLE BRANCH BLOCK (LBBB): ICD-10-CM

## 2024-10-24 PROCEDURE — 1159F MED LIST DOCD IN RCRD: CPT | Performed by: STUDENT IN AN ORGANIZED HEALTH CARE EDUCATION/TRAINING PROGRAM

## 2024-10-24 PROCEDURE — 3077F SYST BP >= 140 MM HG: CPT | Performed by: STUDENT IN AN ORGANIZED HEALTH CARE EDUCATION/TRAINING PROGRAM

## 2024-10-24 PROCEDURE — 1123F ACP DISCUSS/DSCN MKR DOCD: CPT | Performed by: STUDENT IN AN ORGANIZED HEALTH CARE EDUCATION/TRAINING PROGRAM

## 2024-10-24 PROCEDURE — 99214 OFFICE O/P EST MOD 30 MIN: CPT | Performed by: STUDENT IN AN ORGANIZED HEALTH CARE EDUCATION/TRAINING PROGRAM

## 2024-10-24 PROCEDURE — 1036F TOBACCO NON-USER: CPT | Performed by: STUDENT IN AN ORGANIZED HEALTH CARE EDUCATION/TRAINING PROGRAM

## 2024-10-24 PROCEDURE — 3078F DIAST BP <80 MM HG: CPT | Performed by: STUDENT IN AN ORGANIZED HEALTH CARE EDUCATION/TRAINING PROGRAM

## 2024-10-24 ASSESSMENT — ENCOUNTER SYMPTOMS
SHORTNESS OF BREATH: 0
DIZZINESS: 0
CONFUSION: 0
PALPITATIONS: 0
FEVER: 0

## 2024-10-24 NOTE — PROGRESS NOTES
Referred by Dr. Sanjuanita monroy. provider found for No chief complaint on file.     History Of Present Illness:    Tre Sumner is a 73 y.o. 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 Patient reports chest pressure and SOB with exertion. Patient underwent LHC on 8/2024 that showed 40% LAD 50% mLCx patent mRCA stent with 40% dRCA stenosis FFR LCx 0.88. 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 He is following with Dr. Karen Hammer. His last ECG showed sinus rhythm, LBBB, monomorphic PVCs, HR 73 bpm.      Past Medical History:  He has a past medical history of Abnormal electrocardiogram (ECG) (EKG), Encounter for screening for malignant neoplasm of prostate (02/16/2021), Other conditions influencing health status (01/05/2022), and Personal history of other diseases of the musculoskeletal system and connective tissue.    Past Surgical History:  He has a past surgical history that includes Other surgical history (12/01/2016); Shoulder surgery (12/01/2016); Other surgical history (12/01/2016); Hemorrhoid surgery (12/01/2016); Cardiac catheterization (N/A, 8/16/2024); and Cardiac catheterization (N/A, 8/16/2024).      Social History:  He reports that he quit smoking about 50 years ago. His smoking use included cigarettes. He has never used smokeless tobacco. He reports that he does not drink alcohol and does not use drugs.    Family History:  No family history on file.     Allergies:  Aspirin, Penicillins, and Statins-hmg-coa reductase inhibitors    Outpatient Medications:  Current Outpatient Medications   Medication Instructions    atorvastatin (LIPITOR) 40 mg, oral, Nightly    carvedilol (COREG) 12.5 mg, oral, 2 times daily after meals    clopidogrel (PLAVIX) 75 mg, oral, Daily    cyanocobalamin, vitamin B-12, 1,000 mcg tablet, sublingual DISSOLVE 1  [ER: _____] : ER: [unfilled] TABLET UNDER THE TONGUE ONCE A DAY    fexofenadine (Allegra) 180 mg tablet 1 tablet, Daily    Jardiance 25 mg, oral, Daily    pedi multivit no.17 w-fluoride (Poly-Vi-Liane) 0.5 mg tablet,chewable 1 tablet, Daily    sacubitriL-valsartan (Entresto) 49-51 mg tablet 1 tablet, oral, 2 times daily    spironolactone (ALDACTONE) 25 mg, oral, Daily    tafamidis (VYNDAMAX) 61 mg, oral, Daily        Last Recorded Vitals:  Vitals:    10/24/24 1534   BP: 171/71   Pulse: 52   SpO2: 99%   Weight: 69.7 kg (153 lb 10.6 oz)     Review of Systems   Constitutional:  Negative for fever.   Respiratory:  Negative for shortness of breath.    Cardiovascular:  Positive for chest pain. Negative for palpitations and leg swelling.        VARELA. As per history.   Neurological:  Negative for dizziness and syncope.   Psychiatric/Behavioral:  Negative for confusion.       Physical Exam  Constitutional:       Appearance: Normal appearance.   Cardiovascular:      Rate and Rhythm: Normal rate and regular rhythm. Extrasystoles are present.     Heart sounds: No murmur heard.     No friction rub. No gallop.   Pulmonary:      Effort: Pulmonary effort is normal.      Breath sounds: Normal breath sounds.   Abdominal:      Palpations: Abdomen is soft.   Musculoskeletal:      Cervical back: Neck supple.   Neurological:      Mental Status: He is alert.   Psychiatric:         Mood and Affect: Mood normal.         Behavior: Behavior normal.           Last Labs:  CBC -  Lab Results   Component Value Date    WBC 4.7 10/17/2024    HGB 15.9 10/17/2024    HCT 50.3 10/17/2024    MCV 94 10/17/2024     10/17/2024       CMP -  Lab Results   Component Value Date    CALCIUM 9.4 10/17/2024    PROT 6.6 10/17/2024    PROT 6.8 10/17/2024    ALBUMIN 4.2 10/17/2024    AST 18 10/17/2024    ALT 17 10/17/2024    ALKPHOS 81 10/17/2024    BILITOT 1.3 (H) 10/17/2024       LIPID PANEL -   Lab Results   Component Value Date    CHOL 132 12/05/2023    TRIG 110 12/05/2023    HDL 43.4  [FreeTextEntry1] : Spoke to the pt on the phone, was recently in the ED for n/v and abdominal pain. Symptoms have now resolved, and pt had no acute complain. He was told in the ER that his Hgb is low. On chart review, he has chronic anemia, hgb was at baseline 10-11. He has colonoscopy coming up with GI. pt is instructed to schedule an appointment with IM clinic in 1 month\par \tasneem Garcia\par PGY-1  12/05/2023    CHHDL 3.0 12/05/2023    LDLF 67 02/01/2023    VLDL 22 12/05/2023    NHDL 89 12/05/2023       RENAL FUNCTION PANEL -   Lab Results   Component Value Date    GLUCOSE 129 (H) 10/17/2024     10/17/2024    K 5.1 10/17/2024     10/17/2024    CO2 27 10/17/2024    ANIONGAP 13 10/17/2024    BUN 14 10/17/2024    CREATININE 0.98 10/17/2024    GFRMALE 76 12/05/2022    CALCIUM 9.4 10/17/2024    ALBUMIN 4.2 10/17/2024        Lab Results   Component Value Date     (H) 06/25/2024    HGBA1C 6.7 (A) 10/21/2024       Last Cardiology Tests:    Echo:  Transthoracic Echo (TTE) Complete 07/11/2024    PHYSICIAN INTERPRETATION:  Left Ventricle: The left ventricular systolic function is mildly decreased, with a visually estimated ejection fraction of 40-45%. There is global hypokinesis of the left ventricle with minor regional variations. The left ventricular cavity size is mildly dilated. The left ventricular septal wall thickness is moderately increased. There is mildly increased left ventricular posterior wall thickness. Left Ventricular Global Longitudinal Strain - 8.4 %. Spectral Doppler shows an impaired relaxation pattern of left ventricular diastolic filling. Reduced LV strain at -8.4% with apical sparing pattern which can be seen in restrictive cardiomyopathies such as cardiac amyloidosis.  Left Atrium: The left atrium is normal in size.  Right Ventricle: The right ventricle is normal in size. There is normal right ventricular global systolic function.  Right Atrium: The right atrium is normal in size.  Aortic Valve: The aortic valve is probably trileaflet. There is mild aortic valve thickening. The aortic valve dimensionless index is 0.93. There is mild aortic valve regurgitation. The peak instantaneous gradient of the aortic valve is 11.5 mmHg. The mean gradient of the aortic valve is 6.0 mmHg.  Mitral Valve: The mitral valve is mildly thickened. There is mild mitral annular calcification. There is  mild to moderate mitral valve regurgitation.  Tricuspid Valve: The tricuspid valve is structurally normal. There is mild tricuspid regurgitation. The Doppler estimated RVSP is slightly elevated at 34.9 mmHg.  Pulmonic Valve: The pulmonic valve is structurally normal. There is physiologic pulmonic valve regurgitation.  Pericardium: There is no pericardial effusion noted.  Aorta: The aortic root is normal.        CONCLUSIONS:   1. The left ventricular systolic function is mildly decreased, with a visually estimated ejection fraction of 40-45%.   2. There is global hypokinesis of the left ventricle with minor regional variations.   3. Spectral Doppler shows an impaired relaxation pattern of left ventricular diastolic filling.   4. Left ventricular cavity size is mildly dilated.   5. Moderately increased left ventricular septal thickness.   6. Reduced LV strain at -8.4% with apical sparing pattern which can be seen in restrictive cardiomyopathies such as cardiac amyloidosis.   7. There is normal right ventricular global systolic function.   8. Mild to moderate mitral valve regurgitation.   9. Slightly elevated RVSP.  10. Mild aortic valve regurgitation.    Stress Test:  Nuclear Stress Test 07/24/2024    IMPRESSION:  1. Normal stress myocardial perfusion imaging in response to  pharmacologic stress.  2. Mildly dilated LV size. Gating could not be performed due to  frequent ectopy.    [facility] (08/16/2024):    CONCLUSIONS:   1. Left main: no significant angiographic disease.   2. LAD: twin LAD system--40% mid LAD1 stenosis stenosis.   3. Lcx: 50% mid-vessel disease.   4. RCA: large caliber vessel, patent mid-vessel stent. 40% distal stenosis.   5. LVEDP 11mmHg, no aortic stenosis on LV-Ao gradient.   6. FFR of LCx = 0.88.    [facility] Cardiac Amyloidosis SPECT (08/2024):    IMPRESSION:  1. Amyloid SPECT heart study is suggestive of TTR amyloidosis.          Diagnostic review: I have personally reviewed the result(s)      Assessment/Plan   There are no diagnoses linked to this encounter.    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 Patient reports chest pressure and SOB with exertion. His recent stress test was negative for ischemia. Patient underwent LHC on 8/2024 that showed 40% LAD 50% mLCx patent mRCA stent with 40% dRCA stenosis FFR LCx 0.88. 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 He is following with Dr. Karen Hammer. His last ECG showed sinus rhythm, LBBB, monomorphic PVCs, HR 73 bpm.     I discussed about treatment options for his PVCs, including medical therapy and catheter ablation. I discussed with him about initiating ranolazine, as it is is also a class I AAD, and the patient has conduction system disease and CAD, as well as PVCs. The patient however would like to think about it. He will give us a call with a decision.      Akash Santos MD

## 2024-10-28 ENCOUNTER — HOSPITAL ENCOUNTER (OUTPATIENT)
Dept: RADIOLOGY | Facility: CLINIC | Age: 74
Discharge: HOME | End: 2024-10-28
Payer: MEDICARE

## 2024-10-28 DIAGNOSIS — I10 BENIGN ESSENTIAL HYPERTENSION: ICD-10-CM

## 2024-10-28 DIAGNOSIS — I49.3 FREQUENT PVCS: ICD-10-CM

## 2024-10-28 DIAGNOSIS — I35.1 NONRHEUMATIC AORTIC VALVE INSUFFICIENCY: ICD-10-CM

## 2024-10-28 DIAGNOSIS — I25.10 CORONARY ARTERY DISEASE INVOLVING NATIVE CORONARY ARTERY OF NATIVE HEART WITHOUT ANGINA PECTORIS: ICD-10-CM

## 2024-10-28 DIAGNOSIS — E78.2 MIXED HYPERLIPIDEMIA: ICD-10-CM

## 2024-10-28 DIAGNOSIS — I50.20 HFREF (HEART FAILURE WITH REDUCED EJECTION FRACTION): ICD-10-CM

## 2024-10-28 DIAGNOSIS — R42 LIGHTHEADEDNESS: ICD-10-CM

## 2024-10-28 DIAGNOSIS — I25.10 ATHEROSCLEROSIS OF NATIVE CORONARY ARTERY OF NATIVE HEART WITHOUT ANGINA PECTORIS: ICD-10-CM

## 2024-10-28 DIAGNOSIS — I50.20 HFREF (HEART FAILURE WITH REDUCED EJECTION FRACTION): Primary | ICD-10-CM

## 2024-10-28 DIAGNOSIS — E78.00 PURE HYPERCHOLESTEROLEMIA: ICD-10-CM

## 2024-10-28 DIAGNOSIS — R53.83 TIREDNESS: ICD-10-CM

## 2024-10-28 PROCEDURE — 75561 CARDIAC MRI FOR MORPH W/DYE: CPT | Performed by: INTERNAL MEDICINE

## 2024-10-28 PROCEDURE — 2550000001 HC RX 255 CONTRASTS: Performed by: INTERNAL MEDICINE

## 2024-10-28 PROCEDURE — 75565 CARD MRI VELOC FLOW MAPPING: CPT | Performed by: INTERNAL MEDICINE

## 2024-10-28 PROCEDURE — 75565 CARD MRI VELOC FLOW MAPPING: CPT

## 2024-10-28 PROCEDURE — 75561 CARDIAC MRI FOR MORPH W/DYE: CPT

## 2024-10-28 PROCEDURE — A9575 INJ GADOTERATE MEGLUMI 0.1ML: HCPCS | Performed by: INTERNAL MEDICINE

## 2024-10-28 RX ORDER — SACUBITRIL AND VALSARTAN 49; 51 MG/1; MG/1
1 TABLET, FILM COATED ORAL 2 TIMES DAILY
Qty: 180 TABLET | Refills: 3 | Status: SHIPPED | OUTPATIENT
Start: 2024-10-28

## 2024-10-28 RX ORDER — GADOTERATE MEGLUMINE 376.9 MG/ML
28 INJECTION INTRAVENOUS
Status: COMPLETED | OUTPATIENT
Start: 2024-10-28 | End: 2024-10-28

## 2024-10-28 ASSESSMENT — ENCOUNTER SYMPTOMS
OCCASIONAL FEELINGS OF UNSTEADINESS: 0
LOSS OF SENSATION IN FEET: 0
DEPRESSION: 0

## 2024-10-29 ENCOUNTER — TELEPHONE (OUTPATIENT)
Dept: CARDIOLOGY | Facility: HOSPITAL | Age: 74
End: 2024-10-29
Payer: MEDICARE

## 2024-10-29 PROCEDURE — RXMED WILLOW AMBULATORY MEDICATION CHARGE

## 2024-10-31 ENCOUNTER — PHARMACY VISIT (OUTPATIENT)
Dept: PHARMACY | Facility: CLINIC | Age: 74
End: 2024-10-31
Payer: MEDICARE

## 2024-10-31 ENCOUNTER — SPECIALTY PHARMACY (OUTPATIENT)
Dept: PHARMACY | Facility: CLINIC | Age: 74
End: 2024-10-31

## 2024-10-31 PROCEDURE — RXMED WILLOW AMBULATORY MEDICATION CHARGE

## 2024-11-04 ENCOUNTER — TELEPHONE (OUTPATIENT)
Dept: PHARMACY | Facility: HOSPITAL | Age: 74
End: 2024-11-04
Payer: MEDICARE

## 2024-11-04 NOTE — TELEPHONE ENCOUNTER
Patient Assistance Program Approval:     We are pleased to inform you that your application for assistance has been approved.     This approval is valid through 10/29/2025 as long as the following criteria continue to be satisfied:     Your medication (Entresto, Jardiance) remains covered under your current insurance plan.   Your prescriber does not discontinue therapy.   You do not seek reimbursement from any other private or government-funded programs for the  medication.    Under this program, the pharmacy will first bill your insurance plan for your indemnified specified medication. The [employer] Assistance Fund will then offset your copay balance, so that your out-of pocket expense for your specialty medication will be $0.00.    Emelina Rowell, JeffD

## 2024-11-06 ENCOUNTER — PHARMACY VISIT (OUTPATIENT)
Dept: PHARMACY | Facility: CLINIC | Age: 74
End: 2024-11-06
Payer: MEDICARE

## 2024-11-06 LAB
ATRIAL RATE: 73 BPM
P AXIS: 32 DEGREES
P OFFSET: 176 MS
P ONSET: 117 MS
PR INTERVAL: 158 MS
Q ONSET: 196 MS
QRS COUNT: 12 BEATS
QRS DURATION: 148 MS
QT INTERVAL: 434 MS
QTC CALCULATION(BAZETT): 478 MS
QTC FREDERICIA: 463 MS
R AXIS: -38 DEGREES
T AXIS: 137 DEGREES
T OFFSET: 413 MS
VENTRICULAR RATE: 73 BPM

## 2024-11-08 ENCOUNTER — LAB (OUTPATIENT)
Dept: LAB | Facility: LAB | Age: 74
End: 2024-11-08
Payer: MEDICARE

## 2024-11-08 DIAGNOSIS — E85.4 CARDIAC AMYLOIDOSIS: ICD-10-CM

## 2024-11-08 DIAGNOSIS — I43 CARDIAC AMYLOIDOSIS: ICD-10-CM

## 2024-11-08 DIAGNOSIS — I50.20 HFREF (HEART FAILURE WITH REDUCED EJECTION FRACTION): ICD-10-CM

## 2024-11-08 LAB
ALBUMIN SERPL BCP-MCNC: 4.2 G/DL (ref 3.4–5)
ANION GAP SERPL CALC-SCNC: 12 MMOL/L (ref 10–20)
BNP SERPL-MCNC: 219 PG/ML (ref 0–99)
BUN SERPL-MCNC: 16 MG/DL (ref 6–23)
CALCIUM SERPL-MCNC: 9.2 MG/DL (ref 8.6–10.3)
CHLORIDE SERPL-SCNC: 104 MMOL/L (ref 98–107)
CHOLEST SERPL-MCNC: 140 MG/DL (ref 0–199)
CHOLESTEROL/HDL RATIO: 3.8
CO2 SERPL-SCNC: 26 MMOL/L (ref 21–32)
CREAT SERPL-MCNC: 0.87 MG/DL (ref 0.5–1.3)
EGFRCR SERPLBLD CKD-EPI 2021: 90 ML/MIN/1.73M*2
ERYTHROCYTE [DISTWIDTH] IN BLOOD BY AUTOMATED COUNT: 13.9 % (ref 11.5–14.5)
GLUCOSE SERPL-MCNC: 122 MG/DL (ref 74–99)
HCT VFR BLD AUTO: 49.3 % (ref 41–52)
HDLC SERPL-MCNC: 37.3 MG/DL
HGB BLD-MCNC: 15.6 G/DL (ref 13.5–17.5)
LDLC SERPL CALC-MCNC: 67 MG/DL
MCH RBC QN AUTO: 29.8 PG (ref 26–34)
MCHC RBC AUTO-ENTMCNC: 31.6 G/DL (ref 32–36)
MCV RBC AUTO: 94 FL (ref 80–100)
NON HDL CHOLESTEROL: 103 MG/DL (ref 0–149)
NRBC BLD-RTO: 0 /100 WBCS (ref 0–0)
PHOSPHATE SERPL-MCNC: 3.6 MG/DL (ref 2.5–4.9)
PLATELET # BLD AUTO: 195 X10*3/UL (ref 150–450)
POTASSIUM SERPL-SCNC: 4.8 MMOL/L (ref 3.5–5.3)
RBC # BLD AUTO: 5.24 X10*6/UL (ref 4.5–5.9)
SODIUM SERPL-SCNC: 137 MMOL/L (ref 136–145)
TRIGL SERPL-MCNC: 180 MG/DL (ref 0–149)
TSH SERPL-ACNC: 2.74 MIU/L (ref 0.44–3.98)
VLDL: 36 MG/DL (ref 0–40)
WBC # BLD AUTO: 4.9 X10*3/UL (ref 4.4–11.3)

## 2024-11-08 PROCEDURE — 36415 COLL VENOUS BLD VENIPUNCTURE: CPT

## 2024-11-08 PROCEDURE — 80069 RENAL FUNCTION PANEL: CPT

## 2024-11-08 PROCEDURE — 83880 ASSAY OF NATRIURETIC PEPTIDE: CPT

## 2024-11-08 PROCEDURE — 85027 COMPLETE CBC AUTOMATED: CPT

## 2024-11-08 PROCEDURE — 80061 LIPID PANEL: CPT

## 2024-11-08 PROCEDURE — 84443 ASSAY THYROID STIM HORMONE: CPT

## 2024-11-20 ENCOUNTER — APPOINTMENT (OUTPATIENT)
Dept: PHARMACY | Facility: HOSPITAL | Age: 74
End: 2024-11-20
Payer: MEDICARE

## 2024-11-20 DIAGNOSIS — I50.20 HFREF (HEART FAILURE WITH REDUCED EJECTION FRACTION): ICD-10-CM

## 2024-11-20 NOTE — PROGRESS NOTES
Pharmacist Clinic: Cardiology Management    Tre Sumner is a 73 y.o. male was referred to Clinical Pharmacy Team for heart failure management.     Referring Provider: Karen Hammer MD*    THIS IS A FOLLOW UP PATIENT APPOINTMENT. AT LAST VISIT ON 10/23/2024 WITH PHARMACIST (Emelina Rowell).    Appointment was completed by Tre who was reached at primary number.    REVIEW OF LAST APPT:  Today's initial visit was conducted with Tre to establish with clinical pharmacy. Patient allergies and medications were reviewed and updated. Patient reports he is splitting Jardiance 25mg tablets in half and taking once daily. Patient reports provider was agreeable splitting tablets in half to make supply last longer.   Patient confirms he started on Entresto and spironolactone about 3 weeks ago. Patient endorses increased dizziness daily since starting medications, he reports it is not hindering him from performing daily activities but is noticeable. He has been measuring his BP three times daily, stating it averages about 130/70s. Patient reports occasional SOB when he gets in a hurry but this resolves within a few minutes, he also walks about 1.5-2 miles daily. Patient reports since starting spironolactone and entresto his has noticed his voice is hoarse in the mornings. Patient denies all other s/s of heart failure.  Patient had labs drawn about 2 weeks after initiation of spironolactone/ Entresto, potassium was 5.1 (previously 4.1 8/2/24). Due to potassium increasing >5 mEq/L plan to decrease spironolactone to 12.5mg daily. Patient instructed to get labs redrawn prior to next follow up. Patient verbalizes understanding.  Patient was screened for  PAP. Patient reports paying $126/90 day supply for both Entresto and Jardiance. Patient reports he fills Vyndamax through  Specialty pharmacy and has a adamaris for this medication through [employer].   Patient was enrolled in  PAP    Allergies  Reviewed? Yes    Allergies   Allergen Reactions    Aspirin Unknown    Penicillins Other and Unknown    Statins-Hmg-Coa Reductase Inhibitors Unknown       Past Medical History:   Diagnosis Date    Abnormal electrocardiogram (ECG) (EKG)     Abnormal EKG    Encounter for screening for malignant neoplasm of prostate 02/16/2021    Encounter for prostate cancer screening    Other conditions influencing health status 01/05/2022    History of cough    Personal history of other diseases of the musculoskeletal system and connective tissue     History of osteoarthritis       Current Outpatient Medications on File Prior to Visit   Medication Sig Dispense Refill    atorvastatin (Lipitor) 40 mg tablet TAKE 1 TABLET BY MOUTH AT BEDTIME 90 tablet 3    carvedilol (Coreg) 12.5 mg tablet Take 1 tablet (12.5 mg) by mouth 2 times a day after meals. 180 tablet 3    clopidogrel (Plavix) 75 mg tablet Take 1 tablet (75 mg) by mouth once daily. 90 tablet 3    cyanocobalamin, vitamin B-12, 1,000 mcg tablet, sublingual DISSOLVE 1 TABLET UNDER THE TONGUE ONCE A DAY      empagliflozin (Jardiance) 10 mg Take 1 tablet (10 mg) by mouth once daily. 90 tablet 3    fexofenadine (Allegra) 180 mg tablet Take 1 tablet (180 mg) by mouth once daily.      pedi multivit no.17 w-fluoride (Poly-Vi-Liane) 0.5 mg tablet,chewable Chew 1 tablet once daily. (Patient not taking: Reported on 10/21/2024)      sacubitriL-valsartan (Entresto) 49-51 mg tablet Take 1 tablet by mouth 2 times a day. 180 tablet 3    spironolactone (Aldactone) 25 mg tablet Take 1 tablet (25 mg) by mouth once daily. 30 tablet 11    tafamidis (Vyndamax) 61 mg capsule Take 1 capsule (61 mg) by mouth once daily. 30 capsule 10     No current facility-administered medications on file prior to visit.         RELEVANT LAB RESULTS  Lab Results   Component Value Date    BILITOT 1.3 (H) 10/17/2024    CALCIUM 9.2 11/08/2024    CO2 26 11/08/2024     11/08/2024    CREATININE 0.87 11/08/2024    GLUCOSE  "122 (H) 11/08/2024    ALKPHOS 81 10/17/2024    K 4.8 11/08/2024    PROT 6.6 10/17/2024    PROT 6.8 10/17/2024     11/08/2024    AST 18 10/17/2024    ALT 17 10/17/2024    BUN 16 11/08/2024    ANIONGAP 12 11/08/2024    MG 1.91 06/25/2024    PHOS 3.6 11/08/2024     10/17/2024    ALBUMIN 4.2 11/08/2024    GFRMALE 76 12/05/2022     Lab Results   Component Value Date    TRIG 180 (H) 11/08/2024    CHOL 140 11/08/2024    LDLCALC 67 11/08/2024    HDL 37.3 11/08/2024     No results found for: \"BMCBC\", \"CBCDIF\"     PHARMACEUTICAL ASSESSMENT    MEDICATION RECONCILIATION    Medication Documentation Review Audit       Reviewed by Toma Liang MA (Medical Assistant) on 10/24/24 at 1534      Medication Order Taking? Sig Documenting Provider Last Dose Status   atorvastatin (Lipitor) 40 mg tablet [id number] Yes TAKE 1 TABLET BY MOUTH AT BEDTIME Ruth Willoughby, APRN-CNP Taking Active   carvedilol (Coreg) 12.5 mg tablet [id number] Yes Take 1 tablet (12.5 mg) by mouth 2 times a day after meals. David Ward, DO Taking Active   clopidogrel (Plavix) 75 mg tablet [id number] Yes Take 1 tablet (75 mg) by mouth once daily. David Ward, DO Taking Active   cyanocobalamin, vitamin B-12, 1,000 mcg tablet, sublingual [id number] Yes DISSOLVE 1 TABLET UNDER THE TONGUE ONCE A DAY Historical Provider, MD Taking Active   fexofenadine (Allegra) 180 mg tablet [id number] Yes Take 1 tablet (180 mg) by mouth once daily. Historical Provider, MD Taking Active   Jardiance 25 mg [id number] Yes Take 1 tablet (25 mg) by mouth once daily. David Ward, DO Taking Active   pedi multivit no.17 w-fluoride (Poly-Vi-Liane) 0.5 mg tablet,chewable [id number] No Chew 1 tablet once daily.   Patient not taking: Reported on 10/21/2024    Historical Provider, MD Taking Active   sacubitriL-valsartan (Entresto) 49-51 mg tablet [id number] Yes Take 1 tablet by mouth 2 times a day. Albaro Ovalles,  Taking Active   spironolactone (Aldactone) 25 mg tablet [id number] Yes Take 1 " tablet (25 mg) by mouth once daily. Karen Hammer MD PhD Taking Active   tafamidis (Vyndamax) 61 mg capsule [id number] Yes Take 1 capsule (61 mg) by mouth once daily. Karen Hammer MD PhD  Active                    DISEASE MANAGEMENT ASSESSMENT:     CHF ASSESSMENT     Symptom/Staging:  -Most recent ejection fraction: 40-45%    Results for orders placed during the hospital encounter of 07/11/24    Transthoracic Echo (TTE) [place] [address]  Phone [phone] Fax [phone]    TRANSTHORACIC ECHOCARDIOGRAM REPORT    Patient Name:      MAIKEL Ignacio Physician:    [id number] Tila Fagan DO  Study Date:        7/11/2024            Ordering Provider:    [id number] TILA FAGAN  MRN/PID:           [id number]             Fellow:  Accession#:        [id number]         Nurse:  Date of Birth/Age: 1950 / 73 years Sonographer:          Shruti De Oliveira RDCS  Gender:            M                    Additional Staff:  Height:            167.64 cm            Admit Date:           7/11/2024  Weight:            70.31 kg             Admission Status:     Outpatient  BSA / BMI:         1.79 m2 / 25.02      Department Location:  [facility] Echo  kg/m2                                      Lab  Blood Pressure: 162 /84 mmHg    Study Type:    TRANSTHORACIC ECHO (TTE) COMPLETE  Diagnosis/ICD: Ventricular premature depolarization-I49.3; Atherosclerotic heart  disease of native coronary artery without angina pectoris-I25.10;  Unspecified systolic (congestive) heart failure (CHF)-I50.20;  Essential (primary) hypertension-I10  Indication:    Abnormal EKG, CAD, HTN  CPT Codes:     Echo Complete w Full Doppler-73510  Study Detail: The following Echo studies were performed: 2D, M-Mode, Doppler and  color flow. Patient's heart rhythm is with premature ventricular  contractions.      PHYSICIAN INTERPRETATION:  Left Ventricle: The left ventricular systolic  function is mildly decreased, with a visually estimated ejection fraction of 40-45%. There is global hypokinesis of the left ventricle with minor regional variations. The left ventricular cavity size is mildly dilated. The left ventricular septal wall thickness is moderately increased. There is mildly increased left ventricular posterior wall thickness. Left Ventricular Global Longitudinal Strain - 8.4 %. Spectral Doppler shows an impaired relaxation pattern of left ventricular diastolic filling. Reduced LV strain at -8.4% with apical sparing pattern which can be seen in restrictive cardiomyopathies such as cardiac amyloidosis.  Left Atrium: The left atrium is normal in size.  Right Ventricle: The right ventricle is normal in size. There is normal right ventricular global systolic function.  Right Atrium: The right atrium is normal in size.  Aortic Valve: The aortic valve is probably trileaflet. There is mild aortic valve thickening. The aortic valve dimensionless index is 0.93. There is mild aortic valve regurgitation. The peak instantaneous gradient of the aortic valve is 11.5 mmHg. The mean gradient of the aortic valve is 6.0 mmHg.  Mitral Valve: The mitral valve is mildly thickened. There is mild mitral annular calcification. There is mild to moderate mitral valve regurgitation.  Tricuspid Valve: The tricuspid valve is structurally normal. There is mild tricuspid regurgitation. The Doppler estimated RVSP is slightly elevated at 34.9 mmHg.  Pulmonic Valve: The pulmonic valve is structurally normal. There is physiologic pulmonic valve regurgitation.  Pericardium: There is no pericardial effusion noted.  Aorta: The aortic root is normal.      CONCLUSIONS:  1. The left ventricular systolic function is mildly decreased, with a visually estimated ejection fraction of 40-45%.  2. There is global hypokinesis of the left ventricle with minor regional variations.  3. Spectral Doppler shows an impaired relaxation pattern  of left ventricular diastolic filling.  4. Left ventricular cavity size is mildly dilated.  5. Moderately increased left ventricular septal thickness.  6. Reduced LV strain at -8.4% with apical sparing pattern which can be seen in restrictive cardiomyopathies such as cardiac amyloidosis.  7. There is normal right ventricular global systolic function.  8. Mild to moderate mitral valve regurgitation.  9. Slightly elevated RVSP.  10. Mild aortic valve regurgitation.    QUANTITATIVE DATA SUMMARY:  2D MEASUREMENTS:  Normal Ranges:  IVSd:          1.61 cm    (0.6-1.1cm)  LVPWd:         1.16 cm    (0.6-1.1cm)  LVIDd:         5.72 cm    (3.9-5.9cm)  LVIDs:         4.74 cm  LV Mass Index: 197.8 g/m2  LV % FS        17.2 %    LA VOLUME:  Normal Ranges:  LA Vol A4C:        59.1 ml    (22+/-6mL/m2)  LA Vol A2C:        62.5 ml  LA Vol BP:         62.5 ml  LA Vol Index A4C:  32.9 ml/m2  LA Vol Index A2C:  34.9 ml/m2  LA Vol Index BP:   34.8 ml/m2  LA Area A4C:       19.2 cm2  LA Area A2C:       20.3 cm2  LA Major Axis A4C: 5.3 cm  LA Major Axis A2C: 5.6 cm  LA Volume Index:   32.2 ml/m2  LA Vol A4C:        55.4 ml  LA Vol A2C:        59.4 ml    RA VOLUME BY A/L METHOD:  Normal Ranges:  RA Area A4C: 17.0 cm2    M-MODE MEASUREMENTS:  Normal Ranges:  Ao Root: 3.40 cm (2.0-3.7cm)  AoV Exc: 2.00 cm (1.5-2.5cm)    AORTA MEASUREMENTS:  Normal Ranges:  AoV Exc:     2.00 cm (1.5-2.5cm)  Ao Sinus, d: 2.90 cm (2.1-3.5cm)  Ao STJ, d:   2.30 cm (1.7-3.4cm)  Asc Ao, d:   3.30 cm (2.1-3.4cm)    LV SYSTOLIC FUNCTION BY 2D PLANIMETRY (MOD):  Normal Ranges:  EF-A4C View:                      57 % (>=55%)  EF-A2C View:                      59 %  EF-Biplane:                       58 %  EF-Visual:                        43 %  LV EF Reported:                   43 %  Global Longitudinal Strain (GLS): 8 %    LV DIASTOLIC FUNCTION:  Normal Ranges:  MV Peak E:    0.60 m/s    (0.7-1.2 m/s)  MV Peak A:    1.05 m/s    (0.42-0.7 m/s)  E/A Ratio:    0.57         (1.0-2.2)  MV e'         0.056 m/s   (>8.0)  MV lateral e' 0.06 m/s  MV medial e'  0.05 m/s  MV A Dur:     171.27 msec  E/e' Ratio:   10.68       (<8.0)    MITRAL VALVE:  Normal Ranges:  MV DT: 259 msec (150-240msec)    AORTIC VALVE:  Normal Ranges:  AoV Vmax:                1.69 m/s  (<=1.7m/s)  AoV Peak P.5 mmHg (<20mmHg)  AoV Mean P.0 mmHg  (1.7-11.5mmHg)  LVOT Max Elder:            1.34 m/s  (<=1.1m/s)  AoV VTI:                 30.24 cm  (18-25cm)  LVOT VTI:                28.22 cm  LVOT Diameter:           2.04 cm   (1.8-2.4cm)  AoV Area, VTI:           3.06 cm2  (2.5-5.5cm2)  AoV Area,Vmax:           2.59 cm2  (2.5-4.5cm2)  AoV Dimensionless Index: 0.93    AORTIC INSUFFICIENCY:  AI Vmax:       3.76 m/s  AI Half-time:  819 msec  AI Decel Time: 2823 msec  AI Decel Rate: 133.03 cm/s2      RIGHT VENTRICLE:  RV Basal 3.59 cm  RV Mid   3.10 cm  RV Major 6.4 cm  TAPSE:   22.1 mm  RV s'    0.11 m/s    TRICUSPID VALVE/RVSP:  Normal Ranges:  Peak TR Velocity: 2.83 m/s  RV Syst Pressure: 34.9 mmHg (< 30mmHg)  TV e'             0.1 m/s    AORTA:  Asc Ao Diam 3.26 cm      [id number] Albaro Ovalles DO  Electronically signed on 2024 at 3:25:44 PM        ** Final **      Guideline-Directed Medical Therapy:  -ARNI: Yes, describe: Entresto 49/51mg twice daily  -Beta Blocker: Yes, describe: Carvedilol 12.5mg twice daily  -MRA: Yes, describe: Spironolactone 12.5mg daily  -SGLT2i: Yes, describe: Jardiance 10mg daily    Secondary Prevention:  -The 10-year ASCVD risk score (Erica MARQUEZ, et al., 2019) is: 65.4%    Values used to calculate the score:      Age: 73 years      Sex: Male      Is Non- : No      Diabetic: Yes      Tobacco smoker: No      Systolic Blood Pressure: 171 mmHg      Is BP treated: Yes      HDL Cholesterol: 37.3 mg/dL      Total Cholesterol: 140 mg/dL   -Aspirin 81mg? no; plavix 75mg daily  -Statin?: Yes, describe: Atorvastatin 40mg daily  -HTN?: Yes, describe:  controlled at last OV    CURRENT PHARMACOTHERAPY:   Entresto 49/51mg twice daily  Carvedilol 12.5mg twice daily  Spironolactone 12.5mg daily  Jardiance 10mg daily  Vyndamax 61mg daily    Affordability:  PAP  Adherence/Compliance: reports adherence; patient uses pillbox  Adverse Effects: Patient reports stomach upset since starting Entresto for a few months that lasts about an hour and a half after taking each dose. Patient reports dizziness has subsided since last visit.     Monitoring Weights at Home: Yes; couple times per week  Home Weight Recordings: 154lbs- denies weight fluctuation    Past In Office Weight Readings:   Wt Readings from Last 6 Encounters:   10/24/24 69.7 kg (153 lb 10.6 oz)   10/21/24 70.3 kg (155 lb)   10/09/24 70.7 kg (155 lb 12.1 oz)   10/03/24 68.9 kg (152 lb)   10/28/24 69.9 kg (154 lb)   10/02/24 70.9 kg (156 lb 6.4 oz)       Monitoring Blood Pressure at Home: Yes  Home BP Recordings: average 120s/70s    Past In Office BP Readings:   BP Readings from Last 6 Encounters:   10/24/24 171/71   10/21/24 122/76   10/09/24 149/75   10/03/24 152/68   10/02/24 130/62   08/16/24 93/54       HEALTH MANAGEMENT    Maintaining fluid restriction (<2 L/day): N/A  Edema/swelling: No  Shortness of breath: Yes; occasionally when sitting watching tv or is in a hurry  Trouble sleeping/lying down: No  Dry/hacking cough: Yes- hoarseness in the mornings  Recent Hospitalizations: No    EDUCATION/COUNSELING:   - Counseled patient on MOA, expectations, duration of therapy, contraindications, administration, and monitoring parameters  - Counseled patient on lifestyle modifications that can decrease your risk of having complications (smoking cessation, losing weight, daily weights, vaccines)  - Counseled patient on fluid intake and weight management. Recommended to not consume more than 2 liters of fliuids per day. If they have gained more than 2-3 pounds within a 24 hours period (or 5 pounds in a week), contact their  cardiologist  - Answered all patient questions and concerns       DISCUSSION/NOTES:   Today was a follow up visit with Tre. Patient reports worsening stomach upset since starting Entresto a few months ago. He reports it lasts about an hour and a half after taking each dose. We spoke about risk vs benefit of continuing vs stopping Entresto, patient reports it is bothersome but tolerable and is agreeable to continuing Entresto and monitoring stomach upset. Patient reports taking Entresto with food. At last visit patient reported dizziness, he states this has resolved since. Plan to continue current regimen and follow up in 2 months to assess tolerability of medications.  Patient monitors his BP daily, he reports it averages ~120/70s. Patient reports adherence and no adverse effects with his other medications, he denies s/s of worsening heart failure. He continues to report SOB when he gets in a hurry and occasionally when he is sitting watching tv.  Confirmed patient is taking spironolactone 12.5mg daily, this was decreased at last visit due to potassium level being >5mEq/L. Patient obtained labs prior to follow up visit, potassium is WNL (4.8), therefore appropriate to continue on current regimen.    ASSESSMENT AND PLAN:    Assessment/Plan   Problem List Items Addressed This Visit       HFrEF (heart failure with reduced ejection fraction)     Patient is currently on 4 of 4 GDMT medications. Spironolactone was decreased to 12.5mg daily at last visit due to potassium >5 mEq/L. Patient obtained labs prior to follow up appt and potassium level WNL, continue current medication regimen.      Reported BP readings average ~120/70s  Labs (11/08/2024): K-4.8, Scr-0.87 eGFR-90         Relevant Orders    Referral to Clinical Pharmacy       RECOMMENDATIONS/PLAN    CONTINUE  Entresto 49/51mg twice daily  Carvedilol 12.5mg twice daily  Jardiance 10mg daily  Spironolactone 12.5mg daily  Vyndamax 61mg daily    Last Appnt with  Referring Provider: 10/03/2024  Next Appnt with Referring Provider: 04/18/2025  Clinical Pharmacist follow up: 2 months  VAF/Application Expiration: 10/29/2025  Type of Encounter: Adamaris Rowell PharmD    Verbal consent to manage patient's drug therapy was obtained from the patient . They were informed they may decline to participate or withdraw from participation in pharmacy services at any time.    Continue all meds under the continuation of care with the referring provider and clinical pharmacy team.

## 2024-11-20 NOTE — ASSESSMENT & PLAN NOTE
Patient is currently on 4 of 4 GDMT medications. Spironolactone was decreased to 12.5mg daily at last visit due to potassium >5 mEq/L. Patient obtained labs prior to follow up appt and potassium level WNL, continue current medication regimen.      Reported BP readings average ~120/70s  Labs (11/08/2024): K-4.8, Scr-0.87 eGFR-90

## 2024-11-20 NOTE — Clinical Note
Radha Hammer,  Today I spoke with Tre about his heart medications. Confirmed patient is taking spironolactone 12.5mg daily, his potassium is back WNL (4.8). Patient reports worsening stomach upset since being on Entresto but states it is tolerable, he is agreeable to continuing Entresto. Patient reports adherence and no other adverse effects to medications. He reports his BP averages ~120/70s. Plan to continue current regimen and follow up in 2 months to assess tolerability of medications. Thank you!

## 2024-12-02 ENCOUNTER — SPECIALTY PHARMACY (OUTPATIENT)
Dept: PHARMACY | Facility: CLINIC | Age: 74
End: 2024-12-02

## 2024-12-02 PROCEDURE — RXMED WILLOW AMBULATORY MEDICATION CHARGE

## 2024-12-05 ENCOUNTER — PHARMACY VISIT (OUTPATIENT)
Dept: PHARMACY | Facility: CLINIC | Age: 74
End: 2024-12-05
Payer: MEDICARE

## 2024-12-09 PROCEDURE — RXMED WILLOW AMBULATORY MEDICATION CHARGE

## 2024-12-11 ENCOUNTER — PHARMACY VISIT (OUTPATIENT)
Dept: PHARMACY | Facility: CLINIC | Age: 74
End: 2024-12-11
Payer: MEDICARE

## 2024-12-19 ENCOUNTER — APPOINTMENT (OUTPATIENT)
Dept: GENETICS | Facility: CLINIC | Age: 74
End: 2024-12-19
Payer: MEDICARE

## 2024-12-27 ENCOUNTER — SPECIALTY PHARMACY (OUTPATIENT)
Dept: PHARMACY | Facility: CLINIC | Age: 74
End: 2024-12-27

## 2024-12-27 PROCEDURE — RXMED WILLOW AMBULATORY MEDICATION CHARGE

## 2024-12-31 ENCOUNTER — PHARMACY VISIT (OUTPATIENT)
Dept: PHARMACY | Facility: CLINIC | Age: 74
End: 2024-12-31
Payer: MEDICARE

## 2025-01-10 ENCOUNTER — SPECIALTY PHARMACY (OUTPATIENT)
Dept: PHARMACY | Facility: CLINIC | Age: 75
End: 2025-01-10

## 2025-01-10 NOTE — PROGRESS NOTES
"[facility] Clinical Note    Tre Sumner is a 74 y.o. male, who is on the specialty pharmacy service for management of:  Cardiology Core.    Tre Sumner is taking: Vyndamax 61mg by mouth once daily.    Tre was contacted on 1/10/2025 at 10:19 AM for a virtual pharmacy visit with encounter number [id number] from:   [facility]  [address] [place] [id number]  Dept: [phone]  Dept Fax: [phone]    The most recent encounter visit with the referring prescriber Karen Hammer MD on 10/3/24 was reviewed.  Pharmacy will continue to collaborate in the care of this patient with the referring prescriber.    General Assessment  Tre continues on Vyndamax as prescribed with no major intolerances or adherence barriers. Reports having a \"head cold\" in the morning this past month- recommended he discuss with his PCP if symptoms seem to be persisting/worsening to rule out potential infection. Also reports that his heart rate has been pretty low lately. His PCP is prescribing his carvedilol. Recommended he call into the office today to see if they would want to maybe adjust his dose of carvedilol, if needed. He voiced understanding with this. Goal of Vyndamax is to slow progression of his cardiac amyloidosis. It is too soon to say how well med seems to be working just yet for this. Has follow-up with Dr. Hammer in April.     Impression/Plan:  Is patient high risk (potential patients:  pregnancy, geriatric, pediatric)? Yes-geriatric   Is laboratory follow-up needed? As directed per provider  Is a clinical intervention needed? No  Next reassessment date? Approx. 8 months    Refer to the encounter summary report for documentation details about patient counseling and education.      Medication Adherence    The importance of adherence was discussed with the patient and they were advised to take the medication as prescribed by their provider. " Patient was encouraged to call their physician's office if they have a question regarding a missed dose.     QOL/Patient Satisfaction  Rate your quality of life on scale of 1-10: 8  Rate your satisfaction with  Specialty Pharmacy on scale of 1-10: 9      Patient was advised to contact the pharmacy if there are any changes to their medication list, including prescriptions, OTC medications, herbal products, or supplements. Patient was advised of [facility]'s dispensing process, refill timeline, contact information ([phone]), and patient management follow up. Patient confirmed understanding of education conducted during assessment. All patient questions and concerns were addressed to the best of my ability. Patient was encouraged to contact the specialty pharmacy with any questions or concerns.    Confirmed follow-up outreaches are properly scheduled and reviewed goals of therapy with the patient.        Magdalene Rojas, PharmD

## 2025-01-20 ENCOUNTER — SPECIALTY PHARMACY (OUTPATIENT)
Dept: PHARMACY | Facility: CLINIC | Age: 75
End: 2025-01-20

## 2025-01-21 PROCEDURE — RXMED WILLOW AMBULATORY MEDICATION CHARGE

## 2025-01-22 ENCOUNTER — APPOINTMENT (OUTPATIENT)
Dept: PHARMACY | Facility: HOSPITAL | Age: 75
End: 2025-01-22
Payer: MEDICARE

## 2025-01-22 ENCOUNTER — PHARMACY VISIT (OUTPATIENT)
Dept: PHARMACY | Facility: CLINIC | Age: 75
End: 2025-01-22
Payer: MEDICARE

## 2025-01-22 DIAGNOSIS — I50.20 HFREF (HEART FAILURE WITH REDUCED EJECTION FRACTION): ICD-10-CM

## 2025-01-22 NOTE — ASSESSMENT & PLAN NOTE
Patient is currently on 4 of 4 GDMT medications. Reported home BP averages 120s/60s, HR 60s. Renal function and potassium level appropriate to continue current regimen.      Labs (11/08/2024): K-4.8, Scr-0.87 eGFR-90

## 2025-01-22 NOTE — Clinical Note
Radha Hammer,  Today I spoke with Alden about his heart medications. Patient states he is tolerating his heart failure regimen well and reports adherence. Patient reports upset stomach has improved slightly, he takes both doses of Entresto with food and reports this helps. Patient reports no other adverse effects with his other medications and denies s/s of worsening heart failure. Patient reports BP averages 120s/60s. Discussed with patient although there is room to increase Entresto, plan to continue current regimen as prescribed for now. Although stomach upset is not common with Entresto, do not want to exacerbate GI symptoms. Thank you!

## 2025-01-22 NOTE — PROGRESS NOTES
Pharmacist Clinic: Cardiology Management    Tre Sumner is a 74 y.o. male was referred to Clinical Pharmacy Team for heart failure management.     Referring Provider: Karen Hammer MD*    THIS IS A FOLLOW UP PATIENT APPOINTMENT. AT LAST VISIT ON 11/20/2024 WITH PHARMACIST (Emelina Rowell).    Appointment was completed by Tre who was reached at primary number.    REVIEW OF LAST APPT:  Today was a follow up visit with Tre. Patient reports worsening stomach upset since starting Entresto a few months ago. He reports it lasts about an hour and a half after taking each dose. We spoke about risk vs benefit of continuing vs stopping Entresto, patient reports it is bothersome but tolerable and is agreeable to continuing Entresto and monitoring stomach upset. Patient reports taking Entresto with food. At last visit patient reported dizziness, he states this has resolved since. Plan to continue current regimen and follow up in 2 months to assess tolerability of medications.  Patient monitors his BP daily, he reports it averages ~120/70s. Patient reports adherence and no adverse effects with his other medications, he denies s/s of worsening heart failure. He continues to report SOB when he gets in a hurry and occasionally when he is sitting watching tv.  Confirmed patient is taking spironolactone 12.5mg daily, this was decreased at last visit due to potassium level being >5mEq/L. Patient obtained labs prior to follow up visit, potassium is WNL (4.8), therefore appropriate to continue on current regimen.    Allergies Reviewed? No    Allergies   Allergen Reactions    Aspirin Unknown    Penicillins Other and Unknown    Statins-Hmg-Coa Reductase Inhibitors Unknown       Past Medical History:   Diagnosis Date    Abnormal electrocardiogram (ECG) (EKG)     Abnormal EKG    Encounter for screening for malignant neoplasm of prostate 02/16/2021    Encounter for prostate cancer screening    Other conditions influencing  health status 01/05/2022    History of cough    Personal history of other diseases of the musculoskeletal system and connective tissue     History of osteoarthritis       Current Outpatient Medications on File Prior to Visit   Medication Sig Dispense Refill    spironolactone (Aldactone) 25 mg tablet Take 1 tablet (25 mg) by mouth once daily. (Patient taking differently: Take 0.5 tablets (12.5 mg) by mouth once daily.) 30 tablet 11    atorvastatin (Lipitor) 40 mg tablet TAKE 1 TABLET BY MOUTH AT BEDTIME 90 tablet 3    carvedilol (Coreg) 12.5 mg tablet Take 1 tablet (12.5 mg) by mouth 2 times a day after meals. 180 tablet 3    clopidogrel (Plavix) 75 mg tablet Take 1 tablet (75 mg) by mouth once daily. 90 tablet 3    cyanocobalamin, vitamin B-12, 1,000 mcg tablet, sublingual DISSOLVE 1 TABLET UNDER THE TONGUE ONCE A DAY      empagliflozin (Jardiance) 10 mg Take 1 tablet (10 mg) by mouth once daily. 90 tablet 3    fexofenadine (Allegra) 180 mg tablet Take 1 tablet (180 mg) by mouth once daily.      pedi multivit no.17 w-fluoride (Poly-Vi-Liane) 0.5 mg tablet,chewable Chew 1 tablet once daily. (Patient not taking: Reported on 10/21/2024)      sacubitriL-valsartan (Entresto) 49-51 mg tablet Take 1 tablet by mouth 2 times a day. 180 tablet 3    tafamidis (Vyndamax) 61 mg capsule Take 1 capsule (61 mg) by mouth once daily. 30 capsule 10     No current facility-administered medications on file prior to visit.         RELEVANT LAB RESULTS  Lab Results   Component Value Date    BILITOT 1.3 (H) 10/17/2024    CALCIUM 9.2 11/08/2024    CO2 26 11/08/2024     11/08/2024    CREATININE 0.87 11/08/2024    GLUCOSE 122 (H) 11/08/2024    ALKPHOS 81 10/17/2024    K 4.8 11/08/2024    PROT 6.6 10/17/2024    PROT 6.8 10/17/2024     11/08/2024    AST 18 10/17/2024    ALT 17 10/17/2024    BUN 16 11/08/2024    ANIONGAP 12 11/08/2024    MG 1.91 06/25/2024    PHOS 3.6 11/08/2024     10/17/2024    ALBUMIN 4.2 11/08/2024     "GFRMALE 76 12/05/2022     Lab Results   Component Value Date    TRIG 180 (H) 11/08/2024    CHOL 140 11/08/2024    LDLCALC 67 11/08/2024    HDL 37.3 11/08/2024     No results found for: \"BMCBC\", \"CBCDIF\"     PHARMACEUTICAL ASSESSMENT    MEDICATION RECONCILIATION    Medication Documentation Review Audit       Reviewed by Toam Liang MA (Medical Assistant) on 10/24/24 at 1534      Medication Order Taking? Sig Documenting Provider Last Dose Status   atorvastatin (Lipitor) 40 mg tablet [id number] Yes TAKE 1 TABLET BY MOUTH AT BEDTIME Ruth Willoughby, APRN-CNP Taking Active   carvedilol (Coreg) 12.5 mg tablet [id number] Yes Take 1 tablet (12.5 mg) by mouth 2 times a day after meals. David Ward, DO Taking Active   clopidogrel (Plavix) 75 mg tablet [id number] Yes Take 1 tablet (75 mg) by mouth once daily. David Ward, DO Taking Active   cyanocobalamin, vitamin B-12, 1,000 mcg tablet, sublingual [id number] Yes DISSOLVE 1 TABLET UNDER THE TONGUE ONCE A DAY Historical Provider, MD Taking Active   fexofenadine (Allegra) 180 mg tablet [id number] Yes Take 1 tablet (180 mg) by mouth once daily. Historical Provider, MD Taking Active   Jardiance 25 mg [id number] Yes Take 1 tablet (25 mg) by mouth once daily. David Ward, DO Taking Active   pedi multivit no.17 w-fluoride (Poly-Vi-Liane) 0.5 mg tablet,chewable [id number] No Chew 1 tablet once daily.   Patient not taking: Reported on 10/21/2024    Historical Provider, MD Taking Active   sacubitriL-valsartan (Entresto) 49-51 mg tablet [id number] Yes Take 1 tablet by mouth 2 times a day. Albaro Ovalles, DO Taking Active   spironolactone (Aldactone) 25 mg tablet [id number] Yes Take 1 tablet (25 mg) by mouth once daily. Karen Hammer MD PhD Taking Active   tafamidis (Vyndamax) 61 mg capsule [id number] Yes Take 1 capsule (61 mg) by mouth once daily. Karen Hammer MD PhD  Active                    DISEASE MANAGEMENT ASSESSMENT:     CHF ASSESSMENT     Symptom/Staging:  -Most recent " ejection fraction: 40-45%    Results for orders placed during the hospital encounter of 07/11/24    Transthoracic Echo (TTE) Complete    Narrative  [place] [id number]  Phone [phone] Fax [phone]    TRANSTHORACIC ECHOCARDIOGRAM REPORT    Patient Name:      MAIKEL HANCOCK DG Ignacio Physician:    [id number] Tila Fagan DO  Study Date:        7/11/2024            Ordering Provider:    [id number] TILA FAGAN  MRN/PID:           [id number]             Fellow:  Accession#:        [id number]         Nurse:  Date of Birth/Age: 1950 / 73 years Sonographer:          Shruti De Oliveira RDCS  Gender:            M                    Additional Staff:  Height:            167.64 cm            Admit Date:           7/11/2024  Weight:            70.31 kg             Admission Status:     Outpatient  BSA / BMI:         1.79 m2 / 25.02      Department Location:  [facility] Echo  kg/m2                                      Lab  Blood Pressure: 162 /84 mmHg    Study Type:    TRANSTHORACIC ECHO (TTE) COMPLETE  Diagnosis/ICD: Ventricular premature depolarization-I49.3; Atherosclerotic heart  disease of native coronary artery without angina pectoris-I25.10;  Unspecified systolic (congestive) heart failure (CHF)-I50.20;  Essential (primary) hypertension-I10  Indication:    Abnormal EKG, CAD, HTN  CPT Codes:     Echo Complete w Full Doppler-23020  Study Detail: The following Echo studies were performed: 2D, M-Mode, Doppler and  color flow. Patient's heart rhythm is with premature ventricular  contractions.      PHYSICIAN INTERPRETATION:  Left Ventricle: The left ventricular systolic function is mildly decreased, with a visually estimated ejection fraction of 40-45%. There is global hypokinesis of the left ventricle with minor regional variations. The left ventricular cavity size is mildly dilated. The left ventricular septal wall thickness is moderately increased. There is mildly increased  left ventricular posterior wall thickness. Left Ventricular Global Longitudinal Strain - 8.4 %. Spectral Doppler shows an impaired relaxation pattern of left ventricular diastolic filling. Reduced LV strain at -8.4% with apical sparing pattern which can be seen in restrictive cardiomyopathies such as cardiac amyloidosis.  Left Atrium: The left atrium is normal in size.  Right Ventricle: The right ventricle is normal in size. There is normal right ventricular global systolic function.  Right Atrium: The right atrium is normal in size.  Aortic Valve: The aortic valve is probably trileaflet. There is mild aortic valve thickening. The aortic valve dimensionless index is 0.93. There is mild aortic valve regurgitation. The peak instantaneous gradient of the aortic valve is 11.5 mmHg. The mean gradient of the aortic valve is 6.0 mmHg.  Mitral Valve: The mitral valve is mildly thickened. There is mild mitral annular calcification. There is mild to moderate mitral valve regurgitation.  Tricuspid Valve: The tricuspid valve is structurally normal. There is mild tricuspid regurgitation. The Doppler estimated RVSP is slightly elevated at 34.9 mmHg.  Pulmonic Valve: The pulmonic valve is structurally normal. There is physiologic pulmonic valve regurgitation.  Pericardium: There is no pericardial effusion noted.  Aorta: The aortic root is normal.      CONCLUSIONS:  1. The left ventricular systolic function is mildly decreased, with a visually estimated ejection fraction of 40-45%.  2. There is global hypokinesis of the left ventricle with minor regional variations.  3. Spectral Doppler shows an impaired relaxation pattern of left ventricular diastolic filling.  4. Left ventricular cavity size is mildly dilated.  5. Moderately increased left ventricular septal thickness.  6. Reduced LV strain at -8.4% with apical sparing pattern which can be seen in restrictive cardiomyopathies such as cardiac amyloidosis.  7. There is normal right  ventricular global systolic function.  8. Mild to moderate mitral valve regurgitation.  9. Slightly elevated RVSP.  10. Mild aortic valve regurgitation.    QUANTITATIVE DATA SUMMARY:  2D MEASUREMENTS:  Normal Ranges:  IVSd:          1.61 cm    (0.6-1.1cm)  LVPWd:         1.16 cm    (0.6-1.1cm)  LVIDd:         5.72 cm    (3.9-5.9cm)  LVIDs:         4.74 cm  LV Mass Index: 197.8 g/m2  LV % FS        17.2 %    LA VOLUME:  Normal Ranges:  LA Vol A4C:        59.1 ml    (22+/-6mL/m2)  LA Vol A2C:        62.5 ml  LA Vol BP:         62.5 ml  LA Vol Index A4C:  32.9 ml/m2  LA Vol Index A2C:  34.9 ml/m2  LA Vol Index BP:   34.8 ml/m2  LA Area A4C:       19.2 cm2  LA Area A2C:       20.3 cm2  LA Major Axis A4C: 5.3 cm  LA Major Axis A2C: 5.6 cm  LA Volume Index:   32.2 ml/m2  LA Vol A4C:        55.4 ml  LA Vol A2C:        59.4 ml    RA VOLUME BY A/L METHOD:  Normal Ranges:  RA Area A4C: 17.0 cm2    M-MODE MEASUREMENTS:  Normal Ranges:  Ao Root: 3.40 cm (2.0-3.7cm)  AoV Exc: 2.00 cm (1.5-2.5cm)    AORTA MEASUREMENTS:  Normal Ranges:  AoV Exc:     2.00 cm (1.5-2.5cm)  Ao Sinus, d: 2.90 cm (2.1-3.5cm)  Ao STJ, d:   2.30 cm (1.7-3.4cm)  Asc Ao, d:   3.30 cm (2.1-3.4cm)    LV SYSTOLIC FUNCTION BY 2D PLANIMETRY (MOD):  Normal Ranges:  EF-A4C View:                      57 % (>=55%)  EF-A2C View:                      59 %  EF-Biplane:                       58 %  EF-Visual:                        43 %  LV EF Reported:                   43 %  Global Longitudinal Strain (GLS): 8 %    LV DIASTOLIC FUNCTION:  Normal Ranges:  MV Peak E:    0.60 m/s    (0.7-1.2 m/s)  MV Peak A:    1.05 m/s    (0.42-0.7 m/s)  E/A Ratio:    0.57        (1.0-2.2)  MV e'         0.056 m/s   (>8.0)  MV lateral e' 0.06 m/s  MV medial e'  0.05 m/s  MV A Dur:     171.27 msec  E/e' Ratio:   10.68       (<8.0)    MITRAL VALVE:  Normal Ranges:  MV DT: 259 msec (150-240msec)    AORTIC VALVE:  Normal Ranges:  AoV Vmax:                1.69 m/s  (<=1.7m/s)  AoV Peak PG:              11.5 mmHg (<20mmHg)  AoV Mean P.0 mmHg  (1.7-11.5mmHg)  LVOT Max Elder:            1.34 m/s  (<=1.1m/s)  AoV VTI:                 30.24 cm  (18-25cm)  LVOT VTI:                28.22 cm  LVOT Diameter:           2.04 cm   (1.8-2.4cm)  AoV Area, VTI:           3.06 cm2  (2.5-5.5cm2)  AoV Area,Vmax:           2.59 cm2  (2.5-4.5cm2)  AoV Dimensionless Index: 0.93    AORTIC INSUFFICIENCY:  AI Vmax:       3.76 m/s  AI Half-time:  819 msec  AI Decel Time: 2823 msec  AI Decel Rate: 133.03 cm/s2      RIGHT VENTRICLE:  RV Basal 3.59 cm  RV Mid   3.10 cm  RV Major 6.4 cm  TAPSE:   22.1 mm  RV s'    0.11 m/s    TRICUSPID VALVE/RVSP:  Normal Ranges:  Peak TR Velocity: 2.83 m/s  RV Syst Pressure: 34.9 mmHg (< 30mmHg)  TV e'             0.1 m/s    AORTA:  Asc Ao Diam 3.26 cm      [id number] Albaro Ovalles DO  Electronically signed on 2024 at 3:25:44 PM        ** Final **      Guideline-Directed Medical Therapy:  -ARNI: Yes, describe: Entresto 49/51mg twice daily  -Beta Blocker: Yes, describe: Carvedilol 12.5mg twice daily  -MRA: Yes, describe: Spironolactone 12.5mg daily  -SGLT2i: Yes, describe: Jardiance 10mg daily    Secondary Prevention:  -The 10-year ASCVD risk score (Erica MARQUEZ, et al., 2019) is: 68.1%    Values used to calculate the score:      Age: 74 years      Sex: Male      Is Non- : No      Diabetic: Yes      Tobacco smoker: No      Systolic Blood Pressure: 171 mmHg      Is BP treated: Yes      HDL Cholesterol: 37.3 mg/dL      Total Cholesterol: 140 mg/dL   -Aspirin 81mg? no; plavix 75mg daily  -Statin?: Yes, describe: Atorvastatin 40mg daily  -HTN?: Yes, describe: controlled at last OV    CURRENT PHARMACOTHERAPY:   Entresto 49/51mg twice daily  Carvedilol 12.5mg twice daily  Spironolactone 12.5mg daily  Jardiance 10mg daily  Vyndamax 61mg daily    Affordability:  PAP  Adherence/Compliance: reports adherence; patient uses pillbox  Adverse Effects: Patient reports upset stomach  has improved but still happens. Patient does take both doses with food.    Monitoring Weights at Home: Yes; couple times per week  Home Weight Recordings: 156lbs- denies weight fluctuation    Past In Office Weight Readings:   Wt Readings from Last 6 Encounters:   10/24/24 69.7 kg (153 lb 10.6 oz)   10/21/24 70.3 kg (155 lb)   10/09/24 70.7 kg (155 lb 12.1 oz)   10/03/24 68.9 kg (152 lb)   10/28/24 69.9 kg (154 lb)   10/02/24 70.9 kg (156 lb 6.4 oz)       Monitoring Blood Pressure at Home: Yes  Home BP Recordings: average 120s/60s, HR ~60s    Past In Office BP Readings:   BP Readings from Last 6 Encounters:   10/24/24 171/71   10/21/24 122/76   10/09/24 149/75   10/03/24 152/68   10/02/24 130/62   08/16/24 93/54       HEALTH MANAGEMENT    Maintaining fluid restriction (<2 L/day): N/A  Edema/swelling: No  Shortness of breath: Yes; occasionally when sitting watching tv or is in a hurry; no changes  Trouble sleeping/lying down: No  Dry/hacking cough: Yes- hoarseness in the mornings; no changes  Recent Hospitalizations: No    EDUCATION/COUNSELING:   - Counseled patient on MOA, expectations, duration of therapy, contraindications, administration, and monitoring parameters  - Counseled patient on lifestyle modifications that can decrease your risk of having complications (smoking cessation, losing weight, daily weights, vaccines)  - Counseled patient on fluid intake and weight management. Recommended to not consume more than 2 liters of fliuids per day. If they have gained more than 2-3 pounds within a 24 hours period (or 5 pounds in a week), contact their cardiologist  - Answered all patient questions and concerns       DISCUSSION/NOTES:   Today was a follow up visit with Tre. Patient states he is tolerating his heart failure regimen well and reports adherence. Patient reports upset stomach has improved slightly, he takes both doses of Entresto with food and reports this helps. Patient reports no other adverse effects  with his other medications and denies s/s of worsening heart failure. He continues to report SOB when he gets in a hurry and occasionally when he is sitting watching tv. Patient monitors his BP daily, he reports it averages ~120/60s, HR 60s. Discussed with patient although there is room to increase Entresto, plan to continue current regimen as prescribed for now. Although stomach upset is not common with Entresto, do not want to exacerbate GI symptoms.    ASSESSMENT AND PLAN:    Assessment/Plan   Problem List Items Addressed This Visit       HFrEF (heart failure with reduced ejection fraction)     Patient is currently on 4 of 4 GDMT medications. Reported home BP averages 120s/60s, HR 60s. Renal function and potassium level appropriate to continue current regimen.      Labs (11/08/2024): K-4.8, Scr-0.87 eGFR-90         Relevant Orders    Referral to Clinical Pharmacy         RECOMMENDATIONS/PLAN    CONTINUE  Entresto 49/51mg twice daily  Carvedilol 12.5mg twice daily  Jardiance 10mg daily  Spironolactone 12.5mg daily  Vyndamax 61mg daily    Last Appnt with Referring Provider: 10/03/2024  Next Appnt with Referring Provider: 04/18/2025  Clinical Pharmacist follow up: 3 months  VAF/Application Expiration: 10/29/2025  Type of Encounter: Adamaris Rowell PharmD    Verbal consent to manage patient's drug therapy was obtained from the patient . They were informed they may decline to participate or withdraw from participation in pharmacy services at any time.    Continue all meds under the continuation of care with the referring provider and clinical pharmacy team.

## 2025-01-23 ENCOUNTER — PHARMACY VISIT (OUTPATIENT)
Dept: PHARMACY | Facility: CLINIC | Age: 75
End: 2025-01-23
Payer: MEDICARE

## 2025-02-14 ENCOUNTER — SPECIALTY PHARMACY (OUTPATIENT)
Dept: PHARMACY | Facility: CLINIC | Age: 75
End: 2025-02-14

## 2025-02-14 PROCEDURE — RXMED WILLOW AMBULATORY MEDICATION CHARGE

## 2025-02-20 ENCOUNTER — APPOINTMENT (OUTPATIENT)
Dept: GENETICS | Facility: CLINIC | Age: 75
End: 2025-02-20
Payer: MEDICARE

## 2025-02-26 ENCOUNTER — APPOINTMENT (OUTPATIENT)
Dept: GENETICS | Facility: CLINIC | Age: 75
End: 2025-02-26
Payer: MEDICARE

## 2025-02-26 VITALS
SYSTOLIC BLOOD PRESSURE: 148 MMHG | HEIGHT: 65 IN | BODY MASS INDEX: 26.33 KG/M2 | TEMPERATURE: 97.6 F | DIASTOLIC BLOOD PRESSURE: 76 MMHG | RESPIRATION RATE: 18 BRPM | WEIGHT: 158 LBS | HEART RATE: 70 BPM

## 2025-02-26 DIAGNOSIS — Z80.3 FAMILY HISTORY OF BREAST CANCER: Primary | ICD-10-CM

## 2025-02-26 DIAGNOSIS — E85.4 CARDIAC AMYLOIDOSIS: ICD-10-CM

## 2025-02-26 DIAGNOSIS — Z80.42 FAMILY HISTORY OF PROSTATE CANCER: ICD-10-CM

## 2025-02-26 DIAGNOSIS — I43 CARDIAC AMYLOIDOSIS: ICD-10-CM

## 2025-02-26 PROCEDURE — 1159F MED LIST DOCD IN RCRD: CPT | Performed by: INTERNAL MEDICINE

## 2025-02-26 PROCEDURE — 99205 OFFICE O/P NEW HI 60 MIN: CPT | Performed by: INTERNAL MEDICINE

## 2025-02-26 PROCEDURE — 3078F DIAST BP <80 MM HG: CPT | Performed by: INTERNAL MEDICINE

## 2025-02-26 PROCEDURE — 1123F ACP DISCUSS/DSCN MKR DOCD: CPT | Performed by: INTERNAL MEDICINE

## 2025-02-26 PROCEDURE — 3077F SYST BP >= 140 MM HG: CPT | Performed by: INTERNAL MEDICINE

## 2025-02-26 ASSESSMENT — PATIENT HEALTH QUESTIONNAIRE - PHQ9
1. LITTLE INTEREST OR PLEASURE IN DOING THINGS: NOT AT ALL
SUM OF ALL RESPONSES TO PHQ9 QUESTIONS 1 & 2: 0
2. FEELING DOWN, DEPRESSED OR HOPELESS: NOT AT ALL

## 2025-02-26 ASSESSMENT — ENCOUNTER SYMPTOMS
OCCASIONAL FEELINGS OF UNSTEADINESS: 0
LOSS OF SENSATION IN FEET: 0
DEPRESSION: 0

## 2025-02-27 ENCOUNTER — PHARMACY VISIT (OUTPATIENT)
Dept: PHARMACY | Facility: CLINIC | Age: 75
End: 2025-02-27
Payer: MEDICARE

## 2025-02-27 NOTE — PROGRESS NOTES
"MEDICAL GENETICS INITIAL VISIT NOTE     Patient full name: Tre Sumner  MRN: [id number]  YOB: 1950  Present during this visit: The patient, wife, and daughter Carrie    Primary care provider: David Ward DO  Referring provider: Neyda Clements PA-C (Hem/Onc)    Medical history was obtained from the patient and family members. Prior to this appointment, I reviewed medical records from outpatient medical records and scanned documents, if available.     History of present illness:    Dear Neyda Clements PA-C:    It was a pleasure to see Mr. Sumner in clinic today. Mr. Sumner is a 74 y.o. male who was referred to Genetics for cardiac amyloidosis.       Per chart: \"...s/p PCI, cardiomyopathy (EF 40-45%), LBBB, ATTR amyloidosis, frequent PVCs (19% burden),  referred to EP. Patient reports chest pressure and SOB with exertion. Patient underwent LHC on 8/2024 that showed 40% LAD 50% mLCx patent mRCA stent with 40% dRCA stenosis FFR LCx 0.88. 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 ...\".     He was then referred to Genetics for testing. He is taking Vyndamax. Seen by Hematology and they have a low suspicion for AL amyloidosis.     No PMH of cancer. Conoscopy 11/20 - no polyp. Reports of previous colonoscopy are not available in chart.     PMH - CAD,  HFrEF, cardiac amyloidosis, HL, DM type II, mild AR, PVC/LBBB, BPH, HLP, arthritis.     Review of systems:  HEENT: hearing loss+. Denies changes in vision.  RESPIRATORY: Denies dyspnea and cough.  CV: Denies palpitations and chest pain.   GI: Denies dysphagia, abdominal pain, diarrhea, constipation.  : Denies dysuria and urinary frequency.  MSK/NEUROLOGICAL: Subjective weakness+. Some stiffness at hands. Difficult to button a shirt. Some tingling at toes but no symptoms suggestive of carpal tunnel syndrome.     Social history:  Lives with wife. Daughter Carrie is 15min away. Was " "a . Smoked 50 years ago for 10 years, 1PPD. No drinking.    Family history:  Four-generation family tree was obtained and scanned to chart, under \"Genetics\" folder.  Pertinent history   Several family members with \"heart diseases\" but the diagnoses are uncertain. There is \"enlarged heart\" in father. No family member known to have a diagnosis of cardiac or hereditary amyloidosis.   Sister (d) breast ca 50s  Brother (d) lung ca  Brother (d) prostate ca, no mets  Brother (d) prostate ca, mets  Brother (d) liver ca      English, Faroese  Ashkenazi Cheondoism: Denied     Physical examination:  General: Alert. No acute distress. Well-nourished.  Head and face: No significant dysmorphic craniofacial features. Palate not high-arched.   Lungs and chest wall: Clear to auscultation bilaterally. No pectus differences.  CVS: Regular rate and rhythm. No murmur.   Extremities: Hands, feet, fingers and toes appear normal. No pes cavus. No hindfoot deformities. No hammer toes. Not edematous. No tenderness.   Neurologic: Without ophthalmoplegia or nystagmus. No ptosis. No facial palsy. Tongue in midline. No dysarthria. Muscle power 5+ throughout. No pronator drift. No significant muscle atrophy or hypertrophy at hands, feet, and calves. No perioral or muscle fasciculation.  Brachial and patellar reflexes 2+. Normal gait without abnormal movement or tremors.   Psychiatric: Cooperative. Appropriate mood and affect.    Results:    PYP scan 8/2/2024  IMPRESSION:  1. Amyloid SPECT heart study is suggestive of TTR amyloidosis.     Cardiac MRI 10/28/24  IMPRESSION:  1. Mildly dilated left ventricle with mildly depressed systolic function. Ejection fraction 53%.  2. Mild left ventricular hypertrophy. Basal anterior septum 1.2 cm.  3. Delayed enhancement imaging reveals confluent hyperenhancement of the basal anterior ventricular junction 1.1 cm. Confluent hyperenhancement of the basal inferior ventricular junction 1.3 cm. Estimated LV scar " burden 3%.  4. Normal right ventricular cavity size with preserved systolic function. RV EF 61%. No CMR evidence of ARVC.  5. Trileaflet aortic valve without stenosis, mild aortic valve insufficiency.  6. Mild mitral valve regurgitation.  7. Mild tricuspid valve regurgitation.      Assessment:    ###Cardiac amyloidosis  Mr. Sumner is a 74 y.o. male who was referred to Genetics for cardiac amyloidosis diagnosed by cardiac MRI and PYP scan. There are other family members with unknown cardiac diseases.      From [employer]: Hereditary transthyretin amyloidosis (ATTRv amyloidosis) is characterized by a slowly progressive peripheral sensorimotor and/or autonomic neuropathy. Amyloidosis can involve the heart, central nervous system (CNS), eyes, and kidneys. The disease usually begins in the third to fifth decade in persons from endemic foci in [place] and [place]; onset is later in persons from other areas. Typically, sensory neuropathy starts in the lower extremities with paresthesia and hypesthesia of the feet, followed within a few years by motor neuropathy. In some persons, particularly those with early-onset disease, autonomic neuropathy is the first manifestation of the condition; findings can include orthostatic hypotension, constipation alternating with diarrhea, attacks of nausea and vomiting, delayed gastric emptying, sexual impotence, anhidrosis, and urinary retention or incontinence. Cardiac amyloidosis is mainly characterized by progressive restrictive cardiomyopathy. Individuals with leptomeningeal amyloidosis may have the following CNS findings: dementia, psychosis, visual impairment, headache, seizures, motor paresis, ataxia, myelopathy, hydrocephalus, or intracranial hemorrhage. Ocular involvement includes vitreous opacity, glaucoma, dry eye, and ocular amyloid angiopathy. Mild-to-severe kidney disease can develop.    I recommend genetic testing to confirm the diagnosis of ATTR-related hereditary  cardiac amyloidosis.     Benefits of having genetic test include 1) to establish a molecular diagnosis; 2) to provide further medical recommendations specific to each diagnosis; 3) for familial cascade testing, recurrence risk estimation, and family planning; and 4) to allow for participation in support group organizations and enrolment in clinical trials, if any. Pretest genetic counseling was provided, including the nature of the test; three types of test result (positive, negative, and uncertain); the fact that a negative result does not exclude a possibility of genetic disorders; retention of de-identified genetic data at the testing company. The patient provided a verbal informed consent.     Plan:  -[employer] sequencing/del-dup analysis of the TTR gene. If non-diagnostic, to broaden to cover other cardiomyopathy genes. TAT 4 weeks. Sample: blood obtained today. Insurance billing. Mr. Sumner is aware of a potential self-pay option of $250.    ###FH of cancer  We discussed concept of cancer predisposition syndromes. Lifetime risk of cancer in general population is 1/3. Approximately 5%-10% of cancer is caused by a pathogenic variants in cancer predisposition genes. Examples of red flags that raise concern regarding the possibility of underlying genetic mutation include multiple primary cancers in one person, multiple family members with cancer, and young age at diagnosis. Identification of an individual with genetic predisposition is important since we could provide medical recommendations (mostly related to cancer screening) specific to that mutation, as well as advise regarding recurrence risk and family planning options.     Mr. Sumner meets the NCCN genetic testing criteria due to family history of breast cancer and prostate cancer (x2, one of whom having metastatic disease). Testing is necessary and clinically indicated. I recommended a gene panel of common hereditary cancer genes. .    I discussed  that the most appropriate person to have genetic testing is the one who is affected with cancer. If the pathogenic variant is identified, we could test other first-degree relatives. The reason that we do not test an asymptomatic individual is that the negative result is uninformative. We would not know if it is negative because Mr. Sumner does not inherit the familial variant, if any (true negative) or if the test fails to detect the familial variant (we do not know what to be looking for; false negative). Thus, a negative test does not provide a 100% reassurance that Mr. Sumner has not inherited the familial variant, if any.     We discussed Genetic Information Non-discrimination Act (FATIMAH), which is a federal law that inhibits employer and health insurance to make decision based on genetic information. There is also another layer of protection from state law. It however does not apply to life insurance, nursing home insurance, and disability insurance. Having a genetic variant that predisposes to cancers in an asymptomatic individual may have insurability implications.     Plan:  -Patient is interested in proceeding with testing. [facility], DNA/RNA panel. Pretest counseling provided as outlined above. Blood samples obtained today. Insurance billing. Since he meets the testing criteria, [facility] would not balance bill.     Return to clinic when results are available.    Thank you for allowing me to participate in the care of this patient. Please do not hesitate to contact me if you have any questions.     Sincerely,     Henry Jackson MD    Medical Geneticist  [facility]  Phone: [phone]  Fax: [phone]   Address: [address] [place] [address]  Time spent (this information is required by insurance): face-to-face 45min; preparation 5min; documentation 20min; packaging and shipping sample 5min; placing order(s) for genetic testing 5min;  total time spent 80min

## 2025-03-04 PROCEDURE — RXMED WILLOW AMBULATORY MEDICATION CHARGE

## 2025-03-06 ENCOUNTER — PHARMACY VISIT (OUTPATIENT)
Dept: PHARMACY | Facility: CLINIC | Age: 75
End: 2025-03-06
Payer: MEDICARE

## 2025-03-24 ENCOUNTER — SPECIALTY PHARMACY (OUTPATIENT)
Dept: PHARMACY | Facility: CLINIC | Age: 75
End: 2025-03-24

## 2025-03-26 PROBLEM — I50.32 HEART FAILURE WITH RECOVERED EJECTION FRACTION (HFRECEF): Status: ACTIVE | Noted: 2025-03-26

## 2025-03-26 NOTE — PROGRESS NOTES
[facility] and Vascular Cardiology    Patient Name: Tre Sumner  Patient : 1950    Scribe Attestation  By signing my name below, Germania QUINTAINLLA, Danilo attest that this documentation has been prepared under the direction and in the presence of Albaro Ovalles DO.    Physician Attestation  Albaro QUINTANILLA DO, personally performed the services described in the documentation as scribed by Germania Shin in my presence, and confirm it is both accurate and complete.    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  The patient was last evaluated by me in 2024.  At that visit I had recommended he keep his appointments with advanced heart failure, hematology, and EP cardiology.  I discontinued losartan/hydrochlorothiazide and started Entresto 100 mg twice daily, ordered a cardiac MRI, ordered blood work including CMP/lipid/magnesium/CBC to be drawn in 6 months, and asked the patient to follow-up in 6 months and sooner if necessary.  The patient was subsequently seen by the heart failure service on 10/3/2024 at which time patient was started on tafamidis.  Patient was seen by EP cardiology on 10/24/2024 at which time medical therapy or catheter ablation was discussed although patient wanted to consider options before proceeding forward with any treatment.  CMP done 4/3/2025 showed normal serum sodium and potassium with a serum creatinine 090, Norml ALT and AST. CBC showed  a hemoglobin of 15.7. BNP done in November 2024 was 219, TSH was 2.74,   Lipid panel done 11/8/2024 showed an LDL cholesterol of 67 and triglycerides of 180 while on atorvastatin 40 mg daily.  Cardiac MRI done 10/28/2024 showed mildly dilated left ventricle with mildly reduced left ventricular systolic function, ejection fraction of 53%, LVH, delayed enhancement of the basal anterior and basal inferior ventricular junction with a scar burden estimated at 3%, normal right ventricular systolic function with a right ventricular ejection fraction of 61%, no CMR evidence for ARVC, trileaflet aortic valve with mild aortic valve regurgitation, mild mitral valve regurgitation, mild tricuspid valve regurgitation. ECG done today showed sinus rhythm with a heart rate of 64 bpm, and PVCs.  The patient reports that his heart rate at home is sometimes in the low 40s but denies associated lightheadedness or severe fatigue. He also denies any new chest pain, shortness of breath, and palpitations. He reports having stomach upset which he believes could be related to Entresto.  He states that he takes all of his medications as prescribed. During my exam, he was resting comfortably on the exam table.            Assessment/Plan:   1. Coronary artery disease  The patient has a history of coronary artery disease status post PCI of his RCA done in 2010.  ECG done today showed sinus rhythm with a heart rate of 64 bpm, and PVCs.       He denies anginal chest discomfort.  Blood pressure appears controlled on exam today.  He should continue his current antihypertensive medications and antiplatelet therapy.  Cardiac MRI done 10/28/2024 showed mildly dilated left ventricle with mildly reduced left ventricular systolic function, ejection fraction of 53%, LVH, delayed enhancement of the basal anterior and basal inferior ventricular junction with a scar burden estimated at 3%, normal right ventricular systolic function with a right ventricular  ejection fraction of 61%, no CMR evidence for ARVC, trileaflet aortic valve with mild aortic valve regurgitation, mild mitral valve regurgitation, mild tricuspid valve regurgitation.  Echocardiogram done in July 2024 showed mildly reduced left ventricular systolic function with an ejection fraction of 40 to 45%, reduced LV strain at -8.4% with an apical sparing pattern, normal right ventricular systolic function, mild to moderate mitral valve regurgitation, mild aortic valve regurgitation.   Recent lab works as noted in the HPI.   Lipid panel done 11/8/2024 showed an LDL cholesterol of 67 and triglycerides of 180 while on atorvastatin 40 mg daily.    Lab works as noted below will be done in 6 months prior to his next visit.   Please see lifestyle recommendations below.  Follow up in 6 months and sooner if necessary.      2. 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  Cardiac MRI done 10/28/2024 showed mildly dilated left ventricle with mildly reduced left ventricular systolic function, ejection fraction of 53%, LVH, delayed enhancement of the basal anterior and basal inferior ventricular junction with a scar burden estimated at 3%, normal right ventricular systolic function with a right ventricular ejection fraction of 61%, no CMR evidence for ARVC, trileaflet aortic valve with mild aortic valve regurgitation, mild mitral valve regurgitation, mild tricuspid valve regurgitation.  Echocardiogram done in July 2024 showed mildly reduced left ventricular systolic function with an ejection fraction of 40 to 45%, reduced LV strain at -8.4% with an apical sparing pattern, normal right ventricular systolic function, mild to moderate mitral valve regurgitation, mild aortic valve regurgitation.   He does not appear significantly volume overloaded on exam today.  He should continue his current cardiac medications.  Recent lab works as noted in the HPI.   Lab works as noted below  will be done in 6 months prior to his next visit.   Patient should continue to follow with the advanced heart failure.  I discussed with him the importance of following a low-sodium heart healthy diet.  Follow up in 6 months and sooner if necessary.      3. Frequent PVCs  The patient had a history of frequent PVCs.  ECG done today showed sinus rhythm with a heart rate of 64 bpm, and PVCs.   He denies chest pain, palpitations or lightheadedness.   He should continue carvedilol for heart rate control.   Echocardiogram and cardiac MRI as noted above.  Recent lab works as noted in the HPI.  Lab works as noted below will be done in 6 months prior to his next visit.   Patient should follow general recommendations including avoiding excessive alcohol intake, avoiding excessive caffeine intake, staying well-hydrated, getting an appropriate amount of sleep.     4. Aortic valve regurgitation  The patient has a history of aortic valve regurgitation.  Echocardiogram done in July 2024 showed mildly reduced left ventricular systolic function with an ejection fraction of 40 to 45%, reduced LV strain at -8.4% with an apical sparing pattern, normal right ventricular systolic function, mild to moderate mitral valve regurgitation, mild aortic valve regurgitation.   Cardiac MRI done 10/28/2024 showed mildly dilated left ventricle with mildly reduced left ventricular systolic function, ejection fraction of 53%, LVH, delayed enhancement of the basal anterior and basal inferior ventricular junction with a scar burden estimated at 3%, normal right ventricular systolic function with a right ventricular ejection fraction of 61%, no CMR evidence for ARVC, trileaflet aortic valve with mild aortic valve regurgitation, mild mitral valve regurgitation, mild tricuspid valve regurgitation.  I will continue to monitor this clinically.     5. Hypertension  The patient has a history of hypertension which appears controlled on exam today.  He should  continue his current antihypertensive medications and monitor his blood pressure at home.      6. Dyslipidemia  Lipid panel done 11/8/2024 showed an LDL cholesterol of 67 and triglycerides of 180 while on atorvastatin 40 mg daily.    Lipid panel will be updated in 6 months.  Please see lifestyle recommendations below.          Orders:   CMP/lipid/magnesium/CBC/BNP in 6 months,   Follow-up in 6 months.    Lifestyle Recommendations  I recommend a whole-food plant-based diet, an eating pattern that encourages the consumption of unrefined plant foods (such as fruits, vegetables, tubers, whole grains, legumes, nuts and seeds) and discourages meats, dairy products, eggs and processed foods.     The AHA/ACC recommends that the patient consume a dietary pattern that emphasizes intake of vegetables, fruits, and whole grains; includes low-fat dairy products, poultry, fish, legumes, non-tropical vegetable oils, and nuts; and limits intake of sodium, sweets, sugar-sweetened beverages, and red meats.  Adapt this dietary pattern to appropriate calorie requirements (a 500-750 kcal/day deficit to loose weight), personal and cultural food preferences, and nutrition therapy for other medical conditions (including diabetes).  Achieve this pattern by following plans such as the [place] Mediterranean, DASH dietary pattern, or AHA diet.     Engage in 2 hours and 30 minutes per week of moderate-intensity physical activity, or 1 hour and 15 minutes (75 minutes) per week of vigorous-intensity aerobic physical activity, or an equivalent combination of moderate and vigorous-intensity aerobic physical activity. Aerobic activity should be performed in episodes of at least 10 minutes preferably spread throughout the week.     Adhering to a heart healthy diet, regular exercise habits, avoidance of tobacco products, and maintenance of a healthy weight are crucial components of their heart disease risk reduction.     Any positive review of systems  not specifically addressed in the office visit today should be evaluated and treated by the patients primary care physician or in an emergency department if necessary     Patient was notified that results from ordered tests will be called to the patient if it changes current management; it will otherwise be discussed at a future appointment and available on [facility].     Thank you for allowing me to participate in the care of this patient.        This document was generated using the assistance of voice recognition software. If there are any errors of spelling, grammar, syntax, or meaning; please feel free to contact me directly for clarification.    Past Medical History:  He has a past medical history of Abnormal electrocardiogram (ECG) (EKG), Encounter for screening for malignant neoplasm of prostate (02/16/2021), Other conditions influencing health status (01/05/2022), and Personal history of other diseases of the musculoskeletal system and connective tissue.    Past Surgical History:  He has a past surgical history that includes Other surgical history (12/01/2016); Shoulder surgery (12/01/2016); Other surgical history (12/01/2016); Hemorrhoid surgery (12/01/2016); Cardiac catheterization (N/A, 8/16/2024); and Cardiac catheterization (N/A, 8/16/2024).      Social History:  He reports that he quit smoking about 51 years ago. His smoking use included cigarettes. He has never used smokeless tobacco. He reports that he does not drink alcohol and does not use drugs.    Family History:  No family history on file.     Allergies:  Aspirin, Penicillins, and Statins-hmg-coa reductase inhibitors    Outpatient Medications:  Current Outpatient Medications   Medication Instructions    atorvastatin (LIPITOR) 40 mg, oral, Nightly    carvedilol (COREG) 12.5 mg, oral, 2 times daily after meals    clopidogrel (PLAVIX) 75 mg, oral, Daily    cyanocobalamin, vitamin B-12, 1,000 mcg tablet, sublingual DISSOLVE 1 TABLET UNDER THE  "TONGUE ONCE A DAY    fexofenadine (Allegra) 180 mg tablet 1 tablet, Daily    Jardiance 10 mg, oral, Daily    pedi multivit no.17 w-fluoride (Poly-Vi-Liane) 0.5 mg tablet,chewable 1 tablet, Daily    sacubitriL-valsartan (Entresto) 49-51 mg tablet 1 tablet, oral, 2 times daily    spironolactone (ALDACTONE) 25 mg, oral, Daily    Vyndamax 61 mg, oral, Daily        ROS:  A 14 point review of systems was done and is negative other than as stated in HPI    Vitals:      10/2/2024    12:41 PM 10/3/2024     2:50 PM 10/9/2024    10:40 AM 10/21/2024     2:03 PM 10/24/2024     3:34 PM 10/28/2024     1:40 PM 2/26/2025     1:58 PM   Vitals   Systolic 130 152 149 122 171  148   Diastolic 62 68 75 76 71  76   BP Location Left arm  Right arm Right arm   Left arm   Heart Rate 70 73 72 44 52  70   Temp   36.3 °C (97.3 °F) 36.4 °C (97.6 °F)   36.4 °C (97.6 °F)   Resp   16    18   Height 1.676 m (5' 6\") 1.651 m (5' 5\") 1.615 m (5' 3.58\")  1.615 m (5' 3.58\")  1.626 m (5' 4\") 1.651 m (5' 5\")   Weight (lb) 156.4 152 155.76 155 153.66 154 158   BMI 25.24 kg/m2 25.29 kg/m2 27.09 kg/m2 26.96 kg/m2 26.73 kg/m2 26.43 kg/m2 26.29 kg/m2   BSA (m2) 1.82 m2 1.78 m2 1.78 m2 1.78 m2 1.77 m2 1.78 m2 1.81 m2       Significant value        Physical Exam:   Constitutional: Cooperative, in no acute distress, alert, appears stated age.   Skin: Skin color, texture, turgor normal. No rashes or lesions.   Head: Normocephalic. No masses, lesions, tenderness or abnormalities   Eyes: Extraocular movements are grossly intact.   Mouth and throat: Mucous membranes moist   Neck: Neck supple, no carotid bruits, no JVD   Respiratory: Lungs clear to auscultation, no wheezing or rhonchi, no use of accessory muscles   Chest wall: No scars, normal excursion with respiration   Cardiovascular: Regular rhythm, + murmur, +ectopy  Gastrointestinal: Abdomen soft, nontender. Bowel sounds normal.   Musculoskeletal: Strength equal in upper extremities   Extremities: Trivial pitting " edema  Neurologic: Sensation grossly intact, alert and oriented x3      Intake/Output:   No intake/output data recorded.    Outpatient Medications  Current Outpatient Medications on File Prior to Visit   Medication Sig Dispense Refill    atorvastatin (Lipitor) 40 mg tablet TAKE 1 TABLET BY MOUTH AT BEDTIME 90 tablet 3    carvedilol (Coreg) 12.5 mg tablet Take 1 tablet (12.5 mg) by mouth 2 times a day after meals. 180 tablet 3    clopidogrel (Plavix) 75 mg tablet Take 1 tablet (75 mg) by mouth once daily. 90 tablet 3    cyanocobalamin, vitamin B-12, 1,000 mcg tablet, sublingual DISSOLVE 1 TABLET UNDER THE TONGUE ONCE A DAY      empagliflozin (Jardiance) 10 mg Take 1 tablet (10 mg) by mouth once daily. 90 tablet 3    fexofenadine (Allegra) 180 mg tablet Take 1 tablet (180 mg) by mouth once daily.      pedi multivit no.17 w-fluoride (Poly-Vi-Liane) 0.5 mg tablet,chewable Chew 1 tablet once daily. (Patient not taking: Reported on 10/21/2024)      sacubitriL-valsartan (Entresto) 49-51 mg tablet Take 1 tablet by mouth 2 times a day. 180 tablet 3    spironolactone (Aldactone) 25 mg tablet Take 1 tablet (25 mg) by mouth once daily. (Patient taking differently: Take 0.5 tablets (12.5 mg) by mouth once daily.) 30 tablet 11    tafamidis (Vyndamax) 61 mg capsule Take 1 capsule (61 mg) by mouth once daily. 30 capsule 10     No current facility-administered medications on file prior to visit.       Labs: (past 26 weeks)  Recent Results (from the past 26 weeks)   Iron and TIBC    Collection Time: 10/17/24 12:17 PM   Result Value Ref Range    Iron 70 35 - 150 ug/dL    UIBC 269 110 - 370 ug/dL    TIBC 339 240 - 445 ug/dL    % Saturation 21 (L) 25 - 45 %   Lactate Dehydrogenase    Collection Time: 10/17/24 12:17 PM   Result Value Ref Range     84 - 246 U/L   Ferritin    Collection Time: 10/17/24 12:17 PM   Result Value Ref Range    Ferritin 284 20 - 300 ng/mL   Vitamin B12    Collection Time: 10/17/24 12:17 PM   Result Value  Ref Range    Vitamin B12 807 211 - 911 pg/mL   Folate    Collection Time: 10/17/24 12:17 PM   Result Value Ref Range    Folate, Serum 11.6 >5.0 ng/mL   Kappa/Lambda Free Light Chain, Serum    Collection Time: 10/17/24 12:17 PM   Result Value Ref Range    Ig Kappa Free Light Chain 2.72 (H) 0.33 - 1.94 mg/dL    Ig Lambda Free Light Chain 1.73 0.57 - 2.63 mg/dL    Kappa/Lambda Ratio 1.57 0.26 - 1.65   Immunoglobulins (IgG, IgA, IgM)    Collection Time: 10/17/24 12:17 PM   Result Value Ref Range    IgG 1,010 700 - 1,600 mg/dL    IgA 240 70 - 400 mg/dL    IgM 73 40 - 230 mg/dL   CBC and Auto Differential    Collection Time: 10/17/24 12:17 PM   Result Value Ref Range    WBC 4.7 4.4 - 11.3 x10*3/uL    nRBC 0.0 0.0 - 0.0 /100 WBCs    RBC 5.38 4.50 - 5.90 x10*6/uL    Hemoglobin 15.9 13.5 - 17.5 g/dL    Hematocrit 50.3 41.0 - 52.0 %    MCV 94 80 - 100 fL    MCH 29.6 26.0 - 34.0 pg    MCHC 31.6 (L) 32.0 - 36.0 g/dL    RDW 14.0 11.5 - 14.5 %    Platelets 220 150 - 450 x10*3/uL    Neutrophils % 61.6 40.0 - 80.0 %    Immature Granulocytes %, Automated 0.2 0.0 - 0.9 %    Lymphocytes % 21.2 13.0 - 44.0 %    Monocytes % 12.1 2.0 - 10.0 %    Eosinophils % 3.8 0.0 - 6.0 %    Basophils % 1.1 0.0 - 2.0 %    Neutrophils Absolute 2.91 1.60 - 5.50 x10*3/uL    Immature Granulocytes Absolute, Automated 0.01 0.00 - 0.50 x10*3/uL    Lymphocytes Absolute 1.00 0.80 - 3.00 x10*3/uL    Monocytes Absolute 0.57 0.05 - 0.80 x10*3/uL    Eosinophils Absolute 0.18 0.00 - 0.40 x10*3/uL    Basophils Absolute 0.05 0.00 - 0.10 x10*3/uL   Comprehensive Metabolic Panel    Collection Time: 10/17/24 12:17 PM   Result Value Ref Range    Glucose 129 (H) 74 - 99 mg/dL    Sodium 139 136 - 145 mmol/L    Potassium 5.1 3.5 - 5.3 mmol/L    Chloride 104 98 - 107 mmol/L    Bicarbonate 27 21 - 32 mmol/L    Anion Gap 13 10 - 20 mmol/L    Urea Nitrogen 14 6 - 23 mg/dL    Creatinine 0.98 0.50 - 1.30 mg/dL    eGFR 80 >60 mL/min/1.73m*2    Calcium 9.4 8.6 - 10.3 mg/dL     Albumin 4.2 3.4 - 5.0 g/dL    Alkaline Phosphatase 81 33 - 136 U/L    Total Protein 6.6 6.4 - 8.2 g/dL    AST 18 9 - 39 U/L    Bilirubin, Total 1.3 (H) 0.0 - 1.2 mg/dL    ALT 17 10 - 52 U/L   GRICELDA with Reflex to JHONNY    Collection Time: 10/17/24 12:17 PM   Result Value Ref Range    GRICELDA Positive (A) Negative    GRICELDA Pattern Homogeneous     GRICELDA Titer 1:320    Protein, Total    Collection Time: 10/17/24 12:17 PM   Result Value Ref Range    Total Protein 6.8 6.4 - 8.2 g/dL   Serum Protein Electrophoresis + Immunofixation    Collection Time: 10/17/24 12:17 PM   Result Value Ref Range    Albumin 4.1 3.4 - 5.0 g/dL    Alpha 1 Globulin 0.3 0.2 - 0.6 g/dL    Alpha 2 Globulin 0.7 0.4 - 1.1 g/dL    Beta Globulin 0.8 0.5 - 1.2 g/dL    Gamma 1.0 0.5 - 1.4 g/dL    Protein Electrophoresis Comment Normal.     Immunofixation Comment No monoclonal protein detected by immunofixation.     Path Review - Serum Protein Electrophoresis        Reviewed and approved by WERNER TRUONG on 10/23/24 at 2:48 PM.        Path Review - Serum Immunofixation       Reviewed and approved by WERNER TRUONG on 10/23/24 at 2:48 PM.       JHONNY Panel    Collection Time: 10/17/24 12:17 PM   Result Value Ref Range    Anti-SM <0.2 <1.0 AI    Anti-RNP 0.3 <1.0 AI    Anti-SM/RNP <0.2 <1.0 AI    Anti-SSA 0.2 <1.0 AI    Anti-SSB <0.2 <1.0 AI    Anti-SCL-70 <0.2 <1.0 AI    Anti-CUONG-1 IgG <0.2 <1.0 AI    Anti-Chromatin <0.2 <1.0 AI    Anti-Centromere <0.2 <1.0 AI    ANTI-RIBOSOMAL P <0.2 <1.0 AI    Anti-DNA (DS) 1.0 <5.0 IU/mL   POCT glycosylated hemoglobin (Hb A1C) manually resulted    Collection Time: 10/21/24  2:28 PM   Result Value Ref Range    POC HEMOGLOBIN A1c 6.7 (A) 4.2 - 6.5 %   ECG 12 lead (Clinic Performed)    Collection Time: 11/06/24 12:06 PM   Result Value Ref Range    Ventricular Rate 73 BPM    Atrial Rate 73 BPM    AR Interval 158 ms    QRS Duration 148 ms    QT Interval 434 ms    QTC Calculation(Bazett) 478 ms    P Axis 32 degrees    R Axis  -38 degrees    T Axis 137 degrees    QRS Count 12 beats    Q Onset 196 ms    P Onset 117 ms    P Offset 176 ms    T Offset 413 ms    QTC Fredericia 463 ms   B-Type Natriuretic Peptide    Collection Time: 11/08/24 10:11 AM   Result Value Ref Range     (H) 0 - 99 pg/mL   Lipid Panel    Collection Time: 11/08/24 10:11 AM   Result Value Ref Range    Cholesterol 140 0 - 199 mg/dL    HDL-Cholesterol 37.3 mg/dL    Cholesterol/HDL Ratio 3.8     LDL Calculated 67 <=99 mg/dL    VLDL 36 0 - 40 mg/dL    Triglycerides 180 (H) 0 - 149 mg/dL    Non HDL Cholesterol 103 0 - 149 mg/dL   Renal Function Panel    Collection Time: 11/08/24 10:11 AM   Result Value Ref Range    Glucose 122 (H) 74 - 99 mg/dL    Sodium 137 136 - 145 mmol/L    Potassium 4.8 3.5 - 5.3 mmol/L    Chloride 104 98 - 107 mmol/L    Bicarbonate 26 21 - 32 mmol/L    Anion Gap 12 10 - 20 mmol/L    Urea Nitrogen 16 6 - 23 mg/dL    Creatinine 0.87 0.50 - 1.30 mg/dL    eGFR 90 >60 mL/min/1.73m*2    Calcium 9.2 8.6 - 10.3 mg/dL    Phosphorus 3.6 2.5 - 4.9 mg/dL    Albumin 4.2 3.4 - 5.0 g/dL   CBC    Collection Time: 11/08/24 10:11 AM   Result Value Ref Range    WBC 4.9 4.4 - 11.3 x10*3/uL    nRBC 0.0 0.0 - 0.0 /100 WBCs    RBC 5.24 4.50 - 5.90 x10*6/uL    Hemoglobin 15.6 13.5 - 17.5 g/dL    Hematocrit 49.3 41.0 - 52.0 %    MCV 94 80 - 100 fL    MCH 29.8 26.0 - 34.0 pg    MCHC 31.6 (L) 32.0 - 36.0 g/dL    RDW 13.9 11.5 - 14.5 %    Platelets 195 150 - 450 x10*3/uL   TSH with reflex to Free T4 if abnormal    Collection Time: 11/08/24 10:11 AM   Result Value Ref Range    Thyroid Stimulating Hormone 2.74 0.44 - 3.98 mIU/L       ECG  Encounter Date: 10/03/24   ECG 12 lead (Clinic Performed)   Result Value    Ventricular Rate 73    Atrial Rate 73    KY Interval 158    QRS Duration 148    QT Interval 434    QTC Calculation(Bazett) 478    P Axis 32    R Axis -38    T Axis 137    QRS Count 12    Q Onset 196    P Onset 117    P Offset 176    T Offset 413    QTC Fredericia 463     Narrative    Sinus rhythm with frequent Premature ventricular complexes  Left axis deviation  Left bundle branch block  Abnormal ECG  When compared with ECG of 24-JUL-2024 08:57,  QRS axis Shifted left  T wave inversion no longer evident in Inferior leads  Confirmed by Trung Fitzgerald ([id number]) on 11/6/2024 12:20:00 PM       Echocardiogram  No results found for this or any previous visit from the past 1095 days.      CV Studies:  EKG:  Encounter Date: 10/03/24   ECG 12 lead (Clinic Performed)   Result Value    Ventricular Rate 73    Atrial Rate 73    PA Interval 158    QRS Duration 148    QT Interval 434    QTC Calculation(Bazett) 478    P Axis 32    R Axis -38    T Axis 137    QRS Count 12    Q Onset 196    P Onset 117    P Offset 176    T Offset 413    QTC Fredericia 463    Narrative    Sinus rhythm with frequent Premature ventricular complexes  Left axis deviation  Left bundle branch block  Abnormal ECG  When compared with ECG of 24-JUL-2024 08:57,  QRS axis Shifted left  T wave inversion no longer evident in Inferior leads  Confirmed by Trung Fitzgerald ([id number]) on 11/6/2024 12:20:00 PM     Echocardiogram:   [place] [address]  Phone [phone] Fax [phone]    TRANSTHORACIC ECHOCARDIOGRAM REPORT      Patient Name:     MAIKEL Ignacio Physician:  [id number] Davy CONTE MD  Study Date:       12/5/2022       Referring           [id number] TILA FAGAN  Physician:  MRN/PID:          [id number]        PCP:  Accession/Order#: [id number]    [facility] Lab  Location:  YOB: 1950       Fellow:  Gender:           M               Nurse:  Admit Date:       12/5/2022       Sonographer:        Shruti De Oliveira ANDER  Admission Status: Outpatient      Additional Staff:  Height:           165.10 cm       CC Report to:  Weight:           71.22 kg        Study Type:          Echocardiogram  BSA:              1.78 m2  Blood Pressure: 176 /102 mmHg    Diagnosis/ICD: I25.10-Atherosclerotic heart disease of native coronary artery  without angina pectoris; I10-Essential (primary) hypertension;  I35.1-Nonrheumatic aortic (valve) insufficiency  Indication:    CAD, HTN, AI  Procedure/CPT: Echo Complete w Full [id number]  Study Detail: The following Echo studies were performed: 2D, M-Mode, Doppler and  color flow.      PHYSICIAN INTERPRETATION:  Left Ventricle: Left ventricular systolic function is mildly decreased, with an estimated ejection fraction of 40-45%. There is global hypokinesis of the left ventricle with minor regional variations. The left ventricular cavity size is normal. The left ventricular septal wall thickness is mildly increased. There is mild to moderate concentric left ventricular hypertrophy. Spectral Doppler shows an impaired relaxation pattern of left ventricular diastolic filling.  Left Atrium: The left atrium is mildly dilated.  Right Ventricle: The right ventricle is normal in size. There is normal right ventricular global systolic function.  Right Atrium: The right atrium is normal in size.  Aortic Valve: The aortic valve is trileaflet. There is mild aortic valve regurgitation. The mean gradient of the aortic valve is 6.0 mmHg.  Mitral Valve: The mitral valve is normal in structure. There is mild mitral valve regurgitation.  Tricuspid Valve: The tricuspid valve is structurally normal. There is trace tricuspid regurgitation.  Pulmonic Valve: The pulmonic valve is structurally normal. There is trace pulmonic valve regurgitation.  Pericardium: There is no pericardial effusion noted.  Aorta: The aortic root is normal.  Systemic Veins: The inferior vena cava appears to be of normal size. There is IVC inspiratory collapse greater than 50%.      CONCLUSIONS:  1. Left ventricular systolic function is mildly decreased with a 40-45% estimated ejection fraction.  2. Spectral  Doppler shows an impaired relaxation pattern of left ventricular diastolic filling.  3. Mild aortic valve regurgitation.  4. There is global hypokinesis of the left ventricle with minor regional variations.    QUANTITATIVE DATA SUMMARY:  2D MEASUREMENTS:  Normal Ranges:  IVSd:          1.50 cm    (0.6-1.1cm)  LVPWd:         1.30 cm    (0.6-1.1cm)  LVIDd:         3.60 cm    (3.9-5.9cm)  LVIDs:         2.60 cm  LV Mass Index: 100.8 g/m2  LV % FS        27.8 %    LA VOLUME:  Normal Ranges:  LA Vol A4C:        42.6 ml    (22+/-6mL/m2)  LA Vol A2C:        53.0 ml  LA Vol BP:         52.8 ml  LA Vol Index A4C:  23.8ml/m2  LA Vol Index A2C:  29.7 ml/m2  LA Vol Index BP:   29.6 ml/m2  LA Area A4C:       14.5 cm2  LA Area A2C:       18.0 cm2  LA Major Axis A4C: 4.2 cm  LA Major Axis A2C: 5.2 cm  LA Volume Index:   27.0 ml/m2  LA Vol A4C:        38.0 ml  LA Vol A2C:        49.0 ml    RA VOLUME BY A/L METHOD:  Normal Ranges:  RA Area A4C: 15.1 cm2    M-MODE MEASUREMENTS:  Normal Ranges:  Ao Root: 3.40 cm (2.0-3.7cm)  AoV Exc: 2.30 cm (1.5-2.5cm)  LAs:     3.20 cm (2.7-4.0cm)    AORTA MEASUREMENTS:  Normal Ranges:  AoV Exc:     2.30 cm (1.5-2.5cm)  Ao Sinus, d: 3.00 cm (2.1-3.5cm)  Ao STJ, d:   2.10 cm (1.7-3.4cm)  Asc Ao, d:   3.20 cm (2.1-3.4cm)  Ao Arch:     3.00 cm (2.0-3.6cm)    LV SYSTOLIC FUNCTION BY 2D PLANIMETRY (MOD):  Normal Ranges:  EF-A4C View: 45.6 % (>=55%)  EF-A2C View: 44.9 %  EF-Biplane:  44.4 %    LV DIASTOLIC FUNCTION:  Normal Ranges:  MV Peak E:    0.49 m/s    (0.7-1.2 m/s)  MV Peak A:    0.91 m/s    (0.42-0.7 m/s)  E/A Ratio:    0.54        (1.0-2.2)  MV e'         0.04 m/s    (>8.0)  MV lateral e' 0.05 m/s  MV medial e'  0.04 m/s  MV A Dur:     176.00 msec  E/e' Ratio:   11.36       (<8.0)    MITRAL VALVE:  Normal Ranges:  MV DT: 299 msec (150-240msec)    MITRAL INSUFFICIENCY:  Normal Ranges:  MR VTI:  128.00 cm  MR Vmax: 486.00 cm/s    AORTIC VALVE:  Normal Ranges:  AoV Mean P.0 mmHg  (1.7-11.5mmHg)  LVOT Max Elder:            0.99 m/s (<=1.1m/s)  AoV VTI:                 33.50 cm (18-25cm)  LVOT VTI:                19.00 cm  LVOT Diameter:           2.10 cm  (1.8-2.4cm)  AoV Area, VTI:           1.96 cm2 (2.5-5.5cm2)  AoV Dimensionless Index: 0.57    AORTIC INSUFFICIENCY:  AI Vmax:      3.70 m/s  AI Half-time: 662 msec    RIGHT VENTRICLE:  RV 1   3.5 cm  RV 2   2.8 cm  RV 3   6.0 cm  TAPSE: 23.8 mm  RV s'  0.11 m/s    TRICUSPID VALVE/RVSP:  Normal Ranges:  Peak TR Velocity: 2.47 m/s  RV Syst Pressure: 27.4 mmHg (< 30mmHg)      [id number] Davy Will MD  Electronically signed on 12/6/2022 at 5:17:40 PM         Final     Stress Testing IMGRESULT(AVZ4539:1:1825):   NM CARDIAC STRESS REST (MYOCARDIAL PERFUSION MIBI) 01/25/2023    Narrative  MRN: [id number]  Patient Name: MAIKEL CONTE    STUDY:  CARDIAC STRESS/REST INJECTION; PART 2 STRESS OR REST (NO CHARGE);  CARDIAC STRESS/REST (MYOCARDIAL PERFUSION/MIBI);  1/25/2023 10:29 am    INDICATION:  -  I25.10: Atherosclerotic heart disease of native coronary artery  without angina pectoris.    COMPARISON:  None.    ACCESSION NUMBER(S):  [id number]; [id number]; [id number]    ORDERING CLINICIAN:  TILA FAGAN    TECHNIQUE:  DIVISION OF NUCLEAR MEDICINE  PHARMACOLOGIC STRESS MYOCARDIAL PERFUSION SCAN, ONE DAY PROTOCOL    The patient received an intravenous dose of  11.1 mCi of Tc-99m  tetrofosmin and resting emission tomographic (SPECT) images of the  myocardium were acquired. The patient then received an intravenous  infusion of 0.4mg regadenoson (Lexiscan) followed by an additional  dose of  33.6 mCi of Tc-99m tetrofosmin. Stress phase SPECT images of  the myocardium were then acquired. These included ECG-gated images to  assess and quantify ventricular function.    FINDINGS:  There is a small fixed perfusion defect inferior wall which resolves  on prone imaging suggesting diaphragmatic attenuation. No clear  reversible perfusion defects seen.    Calculated  ejection fraction of 43% with global hypokinesis.    Impression  1. There is a small fixed perfusion defect inferior wall which  resolves on prone imaging suggesting diaphragmatic attenuation. No  clear reversible perfusion defects seen. There is a low probability  of ischemia.    2. Calculated ejection fraction of 43% with global hypokinesis.    Cardiac Catheterization: No results found for this or any previous visit from the past 1825 days.  Left Heart Cath, Left Heart Cath 08/16/2024    [facility], Cath Lab, [address] [place] [address]    Cardiovascular Catheterization Report    Patient Name:      MAIKEL CONTE    Performing Physician:  [id number] Reyes Cabrera MD  Study Date:        8/16/2024            Verifying Physician:   [id number] Cabrera MD  MRN/PID:           [id number]             Cardiologist/Co-Scrub:  Accession#:        [id number]         Ordering Provider:     [id number] REYES CABRERA  Date of Birth/Age: 1950 / 73 years Cardiologist:  Gender:            M                    Fellow:  Encounter#:        [id number]           Surgeon:      Study: Left Heart Cath      Indications:  MAIKEL CONTE is a 74 year old male who presents with dyslipidemia, hypertension and a chest pain assessment of atypical angina. New onset angina <=2 months and cardiomyopathy.    Procedure Description:  After infiltration with 2% Lidocaine, the right radial artery was cannulated with a modified Seldinger technique. Subsequently a 6 Azeri sheath was placed in the right radial artery. Selective coronary catheterization was performed using a 5 Fr and 6 Fr catheter(s) exchanged over a guide wire to cannulate the coronary arteries.  Multiple injections of contrast were made into the left and right coronary arteries with angiograms recorded in multiple projections. After completion of the procedure, the arterial sheath was pulled and a TR Band Radial Compression Device was  utilized to obtain patent hemostasis.    Coronary Angiography:  The coronary circulation is right dominant.    Left Main Coronary Artery:  The left main coronary artery showed no significant disease or stenosis greater than 30%.    Left Anterior Descending Coronary Artery Distribution:  The mid left anterior descending coronary artery showed 40% stenosis. This lesion was focal.    Circumflex Coronary Artery Distribution:  The mid circumflex coronary artery showed 50% stenosis.    Right Coronary Artery Distribution:    The RCA showed a previous patent stent. The distal right coronary artery showed 40% stenosis.    Coronary Intervention Comments:  A 6Fr EBU 3.5 guide was used to cannulate the left main ostium and heparin was used for adjunctive anticoagulation. A pressure wire was placed in the distal LCx and IC NTG and IV adenosine was administered. After 2 minutes of adenosine infusion, FFR was 0.88.    Coronary Lesion Summary:  Vessel       Stenosis   Vessel Segment  LAD        40% stenosis      mid  Circumflex 50% stenosis      mid  RCA        40% stenosis     distal      Cardiac Cath Post Procedure Notes:  Post Procedure Diagnosis: Nonobstructive CAD, patent RCA stent.  Blood Loss:               Estimated blood loss during the procedure was 5cc mls.  Specimens Removed:        Number of specimen(s) removed: none.      Recommendations:  Maximize medical therapy.    ____________________________________________________________________________________  CONCLUSIONS:  1. Left main: no significant angiographic disease.  2. LAD: twin LAD system--40% mid LAD1 stenosis stenosis.  3. Lcx: 50% mid-vessel disease.  4. RCA: large caliber vessel, patent mid-vessel stent. 40% distal stenosis.  5. LVEDP 11mmHg, no aortic stenosis on LV-Ao gradient.  6. FFR of LCx = 0.88.    ICD 10 Codes:  Angina pectoris, unspecified-I20.9; Ventricular premature depolarization-I49.3    CPT Codes:  Left Heart Cath (visualization of coronaries) and  LV-30593; FFR, initial Vessel (PCI)-73240; Moderate Sedation Services initial 15 minutes patient >5 years-01810; Moderate Sedation Services 1st additional 15 minutes patient >5 years-06091    [id number] Reyes Cabrera MD  Performing Physician  Electronically signed by [id number] Reyes Cabrera MD on 8/18/2024 at 2:17:58 PM          ** Final **     Cardiac Scoring: No results found for this or any previous visit from the past 1825 days.    AAA : No results found for this or any previous visit from the past 1825 days.    OTHER: No results found for this or any previous visit from the past 1825 days.    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

## 2025-03-30 PROCEDURE — RXMED WILLOW AMBULATORY MEDICATION CHARGE

## 2025-04-01 ENCOUNTER — APPOINTMENT (OUTPATIENT)
Dept: GENETICS | Facility: CLINIC | Age: 75
End: 2025-04-01
Payer: MEDICARE

## 2025-04-01 VITALS
BODY MASS INDEX: 26.33 KG/M2 | WEIGHT: 158 LBS | HEIGHT: 65 IN | SYSTOLIC BLOOD PRESSURE: 144 MMHG | HEART RATE: 43 BPM | DIASTOLIC BLOOD PRESSURE: 61 MMHG | TEMPERATURE: 97.3 F

## 2025-04-01 DIAGNOSIS — E85.4 CARDIAC AMYLOIDOSIS: ICD-10-CM

## 2025-04-01 DIAGNOSIS — Z80.42 FAMILY HISTORY OF PROSTATE CANCER: ICD-10-CM

## 2025-04-01 DIAGNOSIS — Z15.03 BRCA1 GENE MUTATION POSITIVE IN MALE: Primary | ICD-10-CM

## 2025-04-01 DIAGNOSIS — Z15.01 BRCA1 GENE MUTATION POSITIVE IN MALE: Primary | ICD-10-CM

## 2025-04-01 DIAGNOSIS — Z80.3 FAMILY HISTORY OF BREAST CANCER: ICD-10-CM

## 2025-04-01 DIAGNOSIS — I43 CARDIAC AMYLOIDOSIS: ICD-10-CM

## 2025-04-01 DIAGNOSIS — Z15.09 BRCA1 GENE MUTATION POSITIVE IN MALE: Primary | ICD-10-CM

## 2025-04-01 PROCEDURE — 3078F DIAST BP <80 MM HG: CPT | Performed by: INTERNAL MEDICINE

## 2025-04-01 PROCEDURE — 3077F SYST BP >= 140 MM HG: CPT | Performed by: INTERNAL MEDICINE

## 2025-04-01 PROCEDURE — 1159F MED LIST DOCD IN RCRD: CPT | Performed by: INTERNAL MEDICINE

## 2025-04-01 PROCEDURE — G2212 PROLONG OUTPT/OFFICE VIS: HCPCS | Performed by: INTERNAL MEDICINE

## 2025-04-01 PROCEDURE — 99215 OFFICE O/P EST HI 40 MIN: CPT | Performed by: INTERNAL MEDICINE

## 2025-04-01 PROCEDURE — 1123F ACP DISCUSS/DSCN MKR DOCD: CPT | Performed by: INTERNAL MEDICINE

## 2025-04-01 ASSESSMENT — ENCOUNTER SYMPTOMS
LOSS OF SENSATION IN FEET: 0
OCCASIONAL FEELINGS OF UNSTEADINESS: 0
DEPRESSION: 0

## 2025-04-02 DIAGNOSIS — D47.2 MGUS (MONOCLONAL GAMMOPATHY OF UNKNOWN SIGNIFICANCE): Primary | ICD-10-CM

## 2025-04-02 NOTE — PROGRESS NOTES
"  MEDICAL GENETICS FOLLOW-UP VISIT NOTE    Patient full name: Tre Sumner  MRN: [id number]  YOB: 1950  Present during this visit: The patient, wife, and daughter    Primary care provider: David Ward DO  Referring provider: Neyda Clements PA-C (Hem/Onc)     Medical history was obtained from the patient. Prior to this appointment, I reviewed medical records from outpatient medical records and scanned documents, if available.     Interval history:  Mr. Sumner is a 74 y.o. male who returned to Genetics clinic for cardiac amyloidosis. We obtained a next-generation sequencing panel of 56 genes for cardiomyopathy including the TTR gene, which came back negative. A gene panel for cancer came back positive with a pathogenic variant in BRCA1.     From my last note:  Mr. Sumner is a 74 y.o. male who was referred to Genetics for cardiac amyloidosis.       Per chart: \"...s/p PCI, cardiomyopathy (EF 40-45%), LBBB, ATTR amyloidosis, frequent PVCs (19% burden),  referred to EP. Patient reports chest pressure and SOB with exertion. Patient underwent LHC on 8/2024 that showed 40% LAD 50% mLCx patent mRCA stent with 40% dRCA stenosis FFR LCx 0.88. 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 ...\".      He was then referred to Genetics for testing. He is taking Vyndamax. Seen by Hematology and they have a low suspicion for AL amyloidosis.      No PMH of cancer. Conoscopy 11/20 - no polyp. Reports of previous colonoscopy are not available in chart.      PMH - CAD,  HFrEF, cardiac amyloidosis, HL, DM type II, mild AR, PVC/LBBB, BPH, HLP, arthritis.      Review of systems:  HEENT: hearing loss+. Denies changes in vision.  RESPIRATORY: Denies dyspnea and cough.  CV: Denies palpitations and chest pain.   GI: Denies dysphagia, abdominal pain, diarrhea, constipation.  : Denies dysuria and urinary frequency.  MSK/NEUROLOGICAL: Subjective " "weakness+. Some stiffness at hands. Difficult to button a shirt. Some tingling at toes but no symptoms suggestive of carpal tunnel syndrome.      Social history:  Lives with wife. Daughter Carrie is 15min away. Was a . Smoked 50 years ago for 10 years, 1PPD. No drinking.     Family history:  Four-generation family tree was obtained and scanned to chart, under \"Genetics\" folder.  Pertinent history   Several family members with \"heart diseases\" but the diagnoses are uncertain. There is \"enlarged heart\" in father. No family member known to have a diagnosis of cardiac or hereditary amyloidosis.   Sister (d) breast ca 50s  Brother (d) lung ca  Brother (d) prostate ca, no mets  Brother (d) prostate ca, mets  Brother (d) liver ca      English, [place]  Ashkenazi Islam: Denied     Physical examination:  General: Alert. No acute distress. Well-nourished.  Head and face: No significant dysmorphic craniofacial features. Palate not high-arched.   Lungs and chest wall: Clear to auscultation bilaterally. No pectus differences.  CVS: Regular rate and rhythm. No murmur.   Extremities: Hands, feet, fingers and toes appear normal. No pes cavus. No hindfoot deformities. No hammer toes. Not edematous. No tenderness.   Neurologic: Without ophthalmoplegia or nystagmus. No ptosis. No facial palsy. Tongue in midline. No dysarthria. Muscle power 5+ throughout. No pronator drift. No significant muscle atrophy or hypertrophy at hands, feet, and calves. No perioral or muscle fasciculation.  Brachial and patellar reflexes 2+. Normal gait without abnormal movement or tremors.   Psychiatric: Cooperative. Appropriate mood and affect.    Results:  Genetic testing  1) [facility] CancerNext panel (DNA/RNA) -   - Pathogenic, BRCA1, c.[id number], p.T0632Nia*5 (Heterozygous)  - Unknown Significance, POLD1, c.971-5C>T (Heterozygous)    Genes Analyzed (39 total): APC, TIFFANIE, BAP1, BARD1,BMPR1A, BRCA1, BRCA2, BRIP1, CDH1, CDKN2A, CHEK2, FH, FLCN, MET, " MLH1, MSH2, MSH6, MUTYH, NF1, NTHL1, PALB2, PMS2,PTEN, RAD51C, RAD51D, SMAD4, STK11, TP53, TSC1, TSC2 and VHL (sequencing and deletion/duplication); AXIN2, HOXB13, MBD4,MSH3, POLD1 and POLE (sequencing only); EPCAM and GREM1 (deletion/duplication only)    2) [facility] [facility]®: Analyses of 56 Genes Associated with Inherited Cardiomyopathy -- negative  Genes Analyzed (56 total): ABCC9, ACTC1, ACTN2, ALMS1, ALPK3, ANKRD1, BAG3, CRYAB, CSRP3, PHILIP, DMD, DOLK, DSC2, DSG2,DSP, EMD, FHL1, FKRP, FLNC, GLA, HCN4, JPH2, JUP, LAMA4, LAMP2, LDB3, LMNA, MYBPC3, MYH6, MYH7, MYL2, MYL3, MYPN,NEXN, NKX2-5, PKP2, PLN, PRKAG2, PTPN11, RAF1, RBM20, RIT1, RYR2, SCN5A, SOS1, DAWNA, TBX20, TCAP, TMEM43, TNNC1, TNNI3,TNNT2, TPM1, TTN, TTR and VCL (sequencing and deletion/duplication)    PYP scan 8/2/2024  IMPRESSION:  1. Amyloid SPECT heart study is suggestive of TTR amyloidosis.     Cardiac MRI 10/28/24  IMPRESSION:  1. Mildly dilated left ventricle with mildly depressed systolic function. Ejection fraction 53%.  2. Mild left ventricular hypertrophy. Basal anterior septum 1.2 cm.  3. Delayed enhancement imaging reveals confluent hyperenhancement of the basal anterior ventricular junction 1.1 cm. Confluent hyperenhancement of the basal inferior ventricular junction 1.3 cm. Estimated LV scar burden 3%.  4. Normal right ventricular cavity size with preserved systolic function. RV EF 61%. No CMR evidence of ARVC.  5. Trileaflet aortic valve without stenosis, mild aortic valve insufficiency.  6. Mild mitral valve regurgitation.  7. Mild tricuspid valve regurgitation.      Assessment:    ###Cardiac amyloidosis  Mr. Sumner is a 74 y.o. male who was referred to Genetics for cardiac amyloidosis diagnosed by cardiac MRI and PYP scan. There are other family members with unknown cardiac diseases.     Genetic testing of cardiomyopathy genes, including the TTR gene, came back negative. This negative testing suggests that he has wild-type ATTR cardiac  amyloidosis. I reviewed that it is not a hereditary condition and is age-related.     Plan:  - Test report and info re: wild-type ATTR cardiac amyloidosis provided.   - Rest of care per Cardiology.    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      BRCA1-associated cancer risks and options for cancer screening and risk reduction (per the current NCCN guidelines) were reviewed. Individuals with HBOC has increased risk of following cancers:  - Breast cancer: From 12% lifetime risk to up to >60% lifetime risk.   - Ovarian (including fallopian tube and peritoneal) cancer: From 1%-2% lifetime risk to up to 39%-58% lifetime risk.  - Pancreatic cancer: From 1%-3% lifetime risk to up to less than 5% or higher if there is a family history of pancreatic cancer.  - Male breast cancer: From 0.1% lifetime risk to up to 1.2% by 70 years old.  - Prostate cancer: From 6% to 7%-26%.  - Elevated risk of cutaneous and ocular melanoma.     Options for cancer risk management in male BRCA1 carriers include the following:  -Breast self-exam training and education starting at age 35 years.  -Clinical breast exam, every 12 months, starting at age 35 years.  -Consider annual mammogram. The recommendations is not as strong as BRCA2-related HBOC in which the risk of male breast cancer is higher. It can be started at age 50 or 10 years before the earliest known male breast cancer in the family (whichever comes first).  -Consider PSA testing starting at 40.  -Consideration of yearly full body skin exam to screen for melanoma;  -With regard to risk for pancreatic cancer, we reviewed that it is reassuring that Mr. Sumner does not have a family history of pancreatic cancer. Screening for this type of cancer is generally  only recommended for BRCA1 carriers if there is a family history. However, we discussed that this recommendation may change over time as guidelines evolve and/or if there are changes to the family history.      The test also identified a variant of uncertain significance in POLD1. This is not diagnostic. Monoallelic (one change) pathogenic (disease-causing) variants in POLD1 cause multiple polyps ( polyps) and increased risk of colon cancer. This finding would not explain his family history of cancer. I do not have additional recommendations related to this finding. When this variant is reclassified in the future, I will notify the patient.     Genetic counseling and recommendations for other family members:  - We reviewed autosomal dominant inheritance and the concept of cascade familial variant testing. Presymptomatic genetic counseling is recommended for them. His brother, sister, all of his nephews/nieces, and children should see Genetics.   - We do not test minors for this diagnosis.   - If any relatives live in the area and are interested in scheduling their own genetic counseling appointment, they may contact our Cancer Genetics Clinic by calling [phone]. Relatives may also visit [email or web address] to find a genetic counselor in their area.    Plan:  -Age-appropriate cancer screening, including PSA if appropriate, per PCP.   -A referral to the  [facility] placed to establish care.   -A referral to Dermatology placed for skin check.   -Recommendations for other family members as outlined above. A letter for family member offered, declined.   -I encourage Mr. uSmner to keep me updated if there are any changes to his personal or family history of cancer. For example, if there is a family member with pancreatic cancer, pancreatic cancer screening in him is indicated.    Mr. Sumner does not need to see me again in clinic. I am happy to see Mr. Sumner if he  has any questions or concerns with regard to genetic evaluation, or if he has new phenotypes that warrant further genetic evaluation. I am available via [employer].    Thank you for allowing me to participate in the care of this patient. Please do not hesitate to contact me if you have any questions.     Henry Jackson MD    Medical Geneticist  [facility]  Phone: [phone]  Fax: [phone]   Address: [address] [place] [address]  Time spent (this information is required by insurance): face-to-face 46min (10.12am-10.58am); preparation 5min; documentation 20min; total time spent 71min

## 2025-04-03 ENCOUNTER — LAB (OUTPATIENT)
Dept: LAB | Facility: HOSPITAL | Age: 75
End: 2025-04-03
Payer: MEDICARE

## 2025-04-03 ENCOUNTER — PHARMACY VISIT (OUTPATIENT)
Dept: PHARMACY | Facility: CLINIC | Age: 75
End: 2025-04-03
Payer: MEDICARE

## 2025-04-03 DIAGNOSIS — D50.9 NORMOCYTIC HYPOCHROMIC ANEMIA: ICD-10-CM

## 2025-04-03 DIAGNOSIS — D50.9 IRON DEFICIENCY ANEMIA, UNSPECIFIED: ICD-10-CM

## 2025-04-03 DIAGNOSIS — K90.9 IDIOPATHIC STEATORRHEA (HHS-HCC): ICD-10-CM

## 2025-04-03 DIAGNOSIS — R76.8 OTHER SPECIFIED ABNORMAL IMMUNOLOGICAL FINDINGS IN SERUM: ICD-10-CM

## 2025-04-03 DIAGNOSIS — K90.9 INTESTINAL MALABSORPTION, UNSPECIFIED: Primary | ICD-10-CM

## 2025-04-03 DIAGNOSIS — R76.8 ELEVATED SERUM IMMUNOGLOBULIN FREE LIGHT CHAINS: ICD-10-CM

## 2025-04-03 LAB
ALBUMIN SERPL BCP-MCNC: 4.1 G/DL (ref 3.4–5)
ALP SERPL-CCNC: 84 U/L (ref 33–136)
ALT SERPL W P-5'-P-CCNC: 14 U/L (ref 10–52)
ANION GAP SERPL CALC-SCNC: 16 MMOL/L (ref 10–20)
AST SERPL W P-5'-P-CCNC: 19 U/L (ref 9–39)
BASOPHILS # BLD AUTO: 0.04 X10*3/UL (ref 0–0.1)
BASOPHILS NFR BLD AUTO: 0.8 %
BILIRUB SERPL-MCNC: 1.6 MG/DL (ref 0–1.2)
BUN SERPL-MCNC: 17 MG/DL (ref 6–23)
CALCIUM SERPL-MCNC: 9.4 MG/DL (ref 8.6–10.3)
CHLORIDE SERPL-SCNC: 102 MMOL/L (ref 98–107)
CO2 SERPL-SCNC: 27 MMOL/L (ref 21–32)
CREAT SERPL-MCNC: 0.9 MG/DL (ref 0.5–1.3)
EGFRCR SERPLBLD CKD-EPI 2021: 89 ML/MIN/1.73M*2
EOSINOPHIL # BLD AUTO: 0.17 X10*3/UL (ref 0–0.4)
EOSINOPHIL NFR BLD AUTO: 3.5 %
ERYTHROCYTE [DISTWIDTH] IN BLOOD BY AUTOMATED COUNT: 14.7 % (ref 11.5–14.5)
GLUCOSE SERPL-MCNC: 102 MG/DL (ref 74–99)
HCT VFR BLD AUTO: 48.8 % (ref 41–52)
HGB BLD-MCNC: 15.7 G/DL (ref 13.5–17.5)
IGA SERPL-MCNC: 207 MG/DL (ref 70–400)
IGG SERPL-MCNC: 921 MG/DL (ref 700–1600)
IGM SERPL-MCNC: 52 MG/DL (ref 40–230)
IMM GRANULOCYTES # BLD AUTO: 0.01 X10*3/UL (ref 0–0.5)
IMM GRANULOCYTES NFR BLD AUTO: 0.2 % (ref 0–0.9)
LYMPHOCYTES # BLD AUTO: 0.94 X10*3/UL (ref 0.8–3)
LYMPHOCYTES NFR BLD AUTO: 19.1 %
MCH RBC QN AUTO: 29.7 PG (ref 26–34)
MCHC RBC AUTO-ENTMCNC: 32.2 G/DL (ref 32–36)
MCV RBC AUTO: 92 FL (ref 80–100)
MONOCYTES # BLD AUTO: 0.59 X10*3/UL (ref 0.05–0.8)
MONOCYTES NFR BLD AUTO: 12 %
NEUTROPHILS # BLD AUTO: 3.17 X10*3/UL (ref 1.6–5.5)
NEUTROPHILS NFR BLD AUTO: 64.4 %
NRBC BLD-RTO: 0 /100 WBCS (ref 0–0)
PLATELET # BLD AUTO: 223 X10*3/UL (ref 150–450)
POTASSIUM SERPL-SCNC: 4.7 MMOL/L (ref 3.5–5.3)
PROT SERPL-MCNC: 6.5 G/DL (ref 6.4–8.2)
PROT SERPL-MCNC: 6.9 G/DL (ref 6.4–8.2)
RBC # BLD AUTO: 5.28 X10*6/UL (ref 4.5–5.9)
SODIUM SERPL-SCNC: 140 MMOL/L (ref 136–145)
WBC # BLD AUTO: 4.9 X10*3/UL (ref 4.4–11.3)

## 2025-04-03 PROCEDURE — 85025 COMPLETE CBC W/AUTO DIFF WBC: CPT

## 2025-04-03 PROCEDURE — 80053 COMPREHEN METABOLIC PANEL: CPT

## 2025-04-03 PROCEDURE — 82784 ASSAY IGA/IGD/IGG/IGM EACH: CPT

## 2025-04-03 PROCEDURE — 84155 ASSAY OF PROTEIN SERUM: CPT

## 2025-04-03 PROCEDURE — 36415 COLL VENOUS BLD VENIPUNCTURE: CPT

## 2025-04-03 PROCEDURE — 83521 IG LIGHT CHAINS FREE EACH: CPT

## 2025-04-04 LAB
KAPPA LC SERPL-MCNC: 2.24 MG/DL (ref 0.33–1.94)
KAPPA LC/LAMBDA SER: 1.37 {RATIO} (ref 0.26–1.65)
LAMBDA LC SERPL-MCNC: 1.64 MG/DL (ref 0.57–2.63)

## 2025-04-07 ENCOUNTER — OFFICE VISIT (OUTPATIENT)
Dept: CARDIOLOGY | Facility: HOSPITAL | Age: 75
End: 2025-04-07
Payer: MEDICARE

## 2025-04-07 VITALS
SYSTOLIC BLOOD PRESSURE: 120 MMHG | DIASTOLIC BLOOD PRESSURE: 72 MMHG | HEART RATE: 64 BPM | HEIGHT: 65 IN | WEIGHT: 158 LBS | BODY MASS INDEX: 26.33 KG/M2

## 2025-04-07 DIAGNOSIS — E78.00 PURE HYPERCHOLESTEROLEMIA: ICD-10-CM

## 2025-04-07 DIAGNOSIS — I35.1 NONRHEUMATIC AORTIC VALVE INSUFFICIENCY: ICD-10-CM

## 2025-04-07 DIAGNOSIS — R42 LIGHTHEADEDNESS: ICD-10-CM

## 2025-04-07 DIAGNOSIS — R53.83 TIREDNESS: ICD-10-CM

## 2025-04-07 DIAGNOSIS — I25.10 ATHEROSCLEROSIS OF NATIVE CORONARY ARTERY OF NATIVE HEART WITHOUT ANGINA PECTORIS: ICD-10-CM

## 2025-04-07 DIAGNOSIS — I50.20 HFREF (HEART FAILURE WITH REDUCED EJECTION FRACTION): ICD-10-CM

## 2025-04-07 DIAGNOSIS — I25.10 CORONARY ARTERY DISEASE INVOLVING NATIVE CORONARY ARTERY OF NATIVE HEART WITHOUT ANGINA PECTORIS: Primary | ICD-10-CM

## 2025-04-07 DIAGNOSIS — I10 BENIGN ESSENTIAL HYPERTENSION: ICD-10-CM

## 2025-04-07 DIAGNOSIS — I49.3 FREQUENT PVCS: ICD-10-CM

## 2025-04-07 DIAGNOSIS — I50.32 HEART FAILURE WITH RECOVERED EJECTION FRACTION (HFRECEF): ICD-10-CM

## 2025-04-07 DIAGNOSIS — E78.2 MIXED HYPERLIPIDEMIA: ICD-10-CM

## 2025-04-07 LAB
ALBUMIN: 4.1 G/DL (ref 3.4–5)
ALPHA 1 GLOBULIN: 0.3 G/DL (ref 0.2–0.6)
ALPHA 2 GLOBULIN: 0.7 G/DL (ref 0.4–1.1)
ATRIAL RATE: 64 BPM
BETA GLOBULIN: 0.8 G/DL (ref 0.5–1.2)
GAMMA GLOBULIN: 0.9 G/DL (ref 0.5–1.4)
IMMUNOFIXATION COMMENT: NORMAL
P AXIS: 38 DEGREES
P OFFSET: 186 MS
P ONSET: 130 MS
PATH REVIEW - SERUM IMMUNOFIXATION: NORMAL
PATH REVIEW-SERUM PROTEIN ELECTROPHORESIS: NORMAL
PR INTERVAL: 156 MS
PROTEIN ELECTROPHORESIS COMMENT: NORMAL
Q ONSET: 208 MS
QRS COUNT: 10 BEATS
QRS DURATION: 148 MS
QT INTERVAL: 440 MS
QTC CALCULATION(BAZETT): 453 MS
QTC FREDERICIA: 449 MS
R AXIS: 5 DEGREES
T AXIS: 175 DEGREES
T OFFSET: 428 MS
VENTRICULAR RATE: 64 BPM

## 2025-04-07 PROCEDURE — 99214 OFFICE O/P EST MOD 30 MIN: CPT | Performed by: INTERNAL MEDICINE

## 2025-04-07 PROCEDURE — 99214 OFFICE O/P EST MOD 30 MIN: CPT | Mod: 25 | Performed by: INTERNAL MEDICINE

## 2025-04-07 PROCEDURE — 1159F MED LIST DOCD IN RCRD: CPT | Performed by: INTERNAL MEDICINE

## 2025-04-07 PROCEDURE — 1123F ACP DISCUSS/DSCN MKR DOCD: CPT | Performed by: INTERNAL MEDICINE

## 2025-04-07 PROCEDURE — 1036F TOBACCO NON-USER: CPT | Performed by: INTERNAL MEDICINE

## 2025-04-07 PROCEDURE — 93005 ELECTROCARDIOGRAM TRACING: CPT | Performed by: INTERNAL MEDICINE

## 2025-04-07 PROCEDURE — 93010 ELECTROCARDIOGRAM REPORT: CPT | Performed by: INTERNAL MEDICINE

## 2025-04-07 PROCEDURE — 3074F SYST BP LT 130 MM HG: CPT | Performed by: INTERNAL MEDICINE

## 2025-04-07 PROCEDURE — 3078F DIAST BP <80 MM HG: CPT | Performed by: INTERNAL MEDICINE

## 2025-04-08 ENCOUNTER — OFFICE VISIT (OUTPATIENT)
Dept: HEMATOLOGY/ONCOLOGY | Facility: CLINIC | Age: 75
End: 2025-04-08
Payer: MEDICARE

## 2025-04-08 VITALS
HEART RATE: 68 BPM | WEIGHT: 157.52 LBS | TEMPERATURE: 97.2 F | BODY MASS INDEX: 26.21 KG/M2 | DIASTOLIC BLOOD PRESSURE: 79 MMHG | SYSTOLIC BLOOD PRESSURE: 145 MMHG | RESPIRATION RATE: 17 BRPM | OXYGEN SATURATION: 96 %

## 2025-04-08 DIAGNOSIS — R76.8 ELEVATED SERUM IMMUNOGLOBULIN FREE LIGHT CHAINS: ICD-10-CM

## 2025-04-08 DIAGNOSIS — D47.2 MGUS (MONOCLONAL GAMMOPATHY OF UNKNOWN SIGNIFICANCE): ICD-10-CM

## 2025-04-08 LAB
ALBUMIN SERPL BCP-MCNC: 4.1 G/DL (ref 3.4–5)
ALP SERPL-CCNC: 80 U/L (ref 33–136)
ALT SERPL W P-5'-P-CCNC: 15 U/L (ref 10–52)
ANION GAP SERPL CALC-SCNC: 12 MMOL/L (ref 10–20)
AST SERPL W P-5'-P-CCNC: 18 U/L (ref 9–39)
BASOPHILS # BLD AUTO: 0.03 X10*3/UL (ref 0–0.1)
BASOPHILS NFR BLD AUTO: 0.7 %
BILIRUB SERPL-MCNC: 1.1 MG/DL (ref 0–1.2)
BUN SERPL-MCNC: 12 MG/DL (ref 6–23)
CALCIUM SERPL-MCNC: 9.1 MG/DL (ref 8.6–10.3)
CHLORIDE SERPL-SCNC: 105 MMOL/L (ref 98–107)
CO2 SERPL-SCNC: 26 MMOL/L (ref 21–32)
CREAT SERPL-MCNC: 0.86 MG/DL (ref 0.5–1.3)
EGFRCR SERPLBLD CKD-EPI 2021: 90 ML/MIN/1.73M*2
EOSINOPHIL # BLD AUTO: 0.17 X10*3/UL (ref 0–0.4)
EOSINOPHIL NFR BLD AUTO: 3.9 %
ERYTHROCYTE [DISTWIDTH] IN BLOOD BY AUTOMATED COUNT: 14.3 % (ref 11.5–14.5)
GLUCOSE SERPL-MCNC: 168 MG/DL (ref 74–99)
HCT VFR BLD AUTO: 46.8 % (ref 41–52)
HGB BLD-MCNC: 14.8 G/DL (ref 13.5–17.5)
IGA SERPL-MCNC: 215 MG/DL (ref 70–400)
IGG SERPL-MCNC: 904 MG/DL (ref 700–1600)
IGM SERPL-MCNC: 52 MG/DL (ref 40–230)
IMM GRANULOCYTES # BLD AUTO: 0 X10*3/UL (ref 0–0.5)
IMM GRANULOCYTES NFR BLD AUTO: 0 % (ref 0–0.9)
LYMPHOCYTES # BLD AUTO: 0.84 X10*3/UL (ref 0.8–3)
LYMPHOCYTES NFR BLD AUTO: 19.3 %
MCH RBC QN AUTO: 29.9 PG (ref 26–34)
MCHC RBC AUTO-ENTMCNC: 31.6 G/DL (ref 32–36)
MCV RBC AUTO: 95 FL (ref 80–100)
MONOCYTES # BLD AUTO: 0.43 X10*3/UL (ref 0.05–0.8)
MONOCYTES NFR BLD AUTO: 9.9 %
NEUTROPHILS # BLD AUTO: 2.89 X10*3/UL (ref 1.6–5.5)
NEUTROPHILS NFR BLD AUTO: 66.2 %
PLATELET # BLD AUTO: 214 X10*3/UL (ref 150–450)
POTASSIUM SERPL-SCNC: 3.7 MMOL/L (ref 3.5–5.3)
PROT SERPL-MCNC: 6.6 G/DL (ref 6.4–8.2)
PROT SERPL-MCNC: 6.7 G/DL (ref 6.4–8.2)
RBC # BLD AUTO: 4.95 X10*6/UL (ref 4.5–5.9)
SODIUM SERPL-SCNC: 139 MMOL/L (ref 136–145)
WBC # BLD AUTO: 4.4 X10*3/UL (ref 4.4–11.3)

## 2025-04-08 PROCEDURE — 84155 ASSAY OF PROTEIN SERUM: CPT | Mod: GEALAB | Performed by: PHYSICIAN ASSISTANT

## 2025-04-08 PROCEDURE — 86334 IMMUNOFIX E-PHORESIS SERUM: CPT | Mod: GEALAB | Performed by: PHYSICIAN ASSISTANT

## 2025-04-08 PROCEDURE — 80053 COMPREHEN METABOLIC PANEL: CPT | Performed by: PHYSICIAN ASSISTANT

## 2025-04-08 PROCEDURE — 99214 OFFICE O/P EST MOD 30 MIN: CPT | Performed by: PHYSICIAN ASSISTANT

## 2025-04-08 PROCEDURE — 1126F AMNT PAIN NOTED NONE PRSNT: CPT | Performed by: PHYSICIAN ASSISTANT

## 2025-04-08 PROCEDURE — 85025 COMPLETE CBC W/AUTO DIFF WBC: CPT | Performed by: PHYSICIAN ASSISTANT

## 2025-04-08 PROCEDURE — 1123F ACP DISCUSS/DSCN MKR DOCD: CPT | Performed by: PHYSICIAN ASSISTANT

## 2025-04-08 PROCEDURE — 83521 IG LIGHT CHAINS FREE EACH: CPT | Mod: GEALAB | Performed by: PHYSICIAN ASSISTANT

## 2025-04-08 PROCEDURE — 3078F DIAST BP <80 MM HG: CPT | Performed by: PHYSICIAN ASSISTANT

## 2025-04-08 PROCEDURE — 3077F SYST BP >= 140 MM HG: CPT | Performed by: PHYSICIAN ASSISTANT

## 2025-04-08 PROCEDURE — 82784 ASSAY IGA/IGD/IGG/IGM EACH: CPT | Mod: GEALAB | Performed by: PHYSICIAN ASSISTANT

## 2025-04-08 PROCEDURE — 1159F MED LIST DOCD IN RCRD: CPT | Performed by: PHYSICIAN ASSISTANT

## 2025-04-08 ASSESSMENT — PAIN SCALES - GENERAL: PAINLEVEL_OUTOF10: 0-NO PAIN

## 2025-04-08 NOTE — PROGRESS NOTES
Reason for Visit  Tre Sumner is a 74 y.o. male with PMH s/f CAD, HFrEF newly diagnosed TTR amyloidosis referred by Dr. Ovalles for elevated FLC.    He has a history of HFmrEF and on TTE 7/2024 LVEF 40-45%. He underwent PYP scan in 8/2024 which showed grade 2 changes consistent with TTR cardiac amyloidosis. He also had serum light chains with mild elevation in Kappa light chain to 3.05. SPEP and UPEP with immunofixation were negative. No evidence of end organ damage, no anemia, hypercalcemia, kidney dysfunction. Started on Vyndamax by HF cardiologist.    On assessment, reports chronic SOB/VARELA and cough. He denies any neuropathy or carpal tunnel symptoms. Recently started on spirolactone and sacubitril/valsartan and noted to have geographic tongue and rash on face. He also notes chronic bloating and indigestion. C-scope in 11/2020 c/w diverticulosis in the sigmoid colon. Otherwise feeling well. Denies B symptoms, n/v/d/c/abd pain, bleeding from any source, frequent infections, neuropathy/CPT symptoms,     PMH/PSH: HTN, HL, DMII, mild aortic regurgitation, history of PVCs,LBBB, nonobstructive coronary disease and on C 8/2024 found to have 40%LAD 50% mLCx patent mRCA stent with 40% dRCA stenosis, aortic valve regurgitation, BPH,   FH: Brother with liver cancer, brother with prostate cancer, sister with breast cancer  Soc Hx:  Former smoker, denies ETOH, illicit drugs; retired ,  with 3 children.    History of Present Illness:  Patient presents for follow up accompanied by his wife. Continues on tafamidis for TTR cardiac amyloidosis. Recently saw genetics and  TTR gene negative.. This negative testing suggests that he has wild-type ATTR cardiac amyloidosis. Tested positive for BRCA-1.Reports stable fatigue but otherwise feeling well. Appetite stable.    Review of Systems: All of the systems have been reviewed and are negative for complaints except what is stated in the HPI and/or past medical  "history.    Allergies and Intolerances:  Allergies   Allergen Reactions    Aspirin Unknown    Penicillins Other and Unknown    Statins-Hmg-Coa Reductase Inhibitors Unknown     Outpatient Medication Profile:  Current Outpatient Medications   Medication Sig Dispense Refill    atorvastatin (Lipitor) 40 mg tablet TAKE 1 TABLET BY MOUTH AT BEDTIME 90 tablet 3    carvedilol (Coreg) 12.5 mg tablet Take 1 tablet (12.5 mg) by mouth 2 times a day after meals. 180 tablet 3    clopidogrel (Plavix) 75 mg tablet Take 1 tablet (75 mg) by mouth once daily. 90 tablet 3    cyanocobalamin, vitamin B-12, 1,000 mcg tablet, sublingual DISSOLVE 1 TABLET UNDER THE TONGUE ONCE A DAY      empagliflozin (Jardiance) 10 mg Take 1 tablet (10 mg) by mouth once daily. 90 tablet 3    fexofenadine (Allegra) 180 mg tablet Take 1 tablet (180 mg) by mouth once daily.      pedi multivit no.17 w-fluoride (Poly-Vi-Liane) 0.5 mg tablet,chewable Chew 1 tablet once daily.      sacubitriL-valsartan (Entresto) 49-51 mg tablet Take 1 tablet by mouth 2 times a day. 180 tablet 3    spironolactone (Aldactone) 25 mg tablet Take 1 tablet (25 mg) by mouth once daily. 30 tablet 11    tafamidis (Vyndamax) 61 mg capsule Take 1 capsule (61 mg) by mouth once daily. 30 capsule 10     No current facility-administered medications for this visit.      Vitals and Measurements:       10/9/2024    10:40 AM 10/21/2024     2:03 PM 10/24/2024     3:34 PM 10/28/2024     1:40 PM 2/26/2025     1:58 PM 4/1/2025    10:04 AM 4/7/2025    12:26 PM   Vitals   Systolic 149 122 171  148 144 120   Diastolic 75 76 71  76 61 72   BP Location Right arm Right arm   Left arm  Left arm   Heart Rate 72 44 52  70 43 64   Temp 36.3 °C (97.3 °F) 36.4 °C (97.6 °F)   36.4 °C (97.6 °F) 36.3 °C (97.3 °F)    Resp 16    18     Height 1.615 m (5' 3.58\")  1.615 m (5' 3.58\")  1.626 m (5' 4\") 1.651 m (5' 5\") 1.651 m (5' 5\") 1.651 m (5' 5\")   Weight (lb) 155.76 155 153.66 154 158 158 158   BMI 27.09 kg/m2 26.96 " kg/m2 26.73 kg/m2 26.43 kg/m2 26.29 kg/m2 26.29 kg/m2 26.29 kg/m2   BSA (m2) 1.78 m2 1.78 m2 1.77 m2 1.78 m2 1.81 m2 1.81 m2 1.81 m2   Visit Report Report Report Report  Report Report Report       Significant value     Physical Exam:   Constitutional: alert, awake and oriented, not in acute distress   HEENT: moist mucous membranes, normal nose   Neck: supple, no lymphadenopathy   EYES: PERRL, EOM intact, conjunctiva normal  Skin: no jaundice, rash or erythema  Neurological: AAOx3, no gross focal deficit   Psychiatric: normal mood and behavior     Lab Results:  Lab Results   Component Value Date    WBC 4.4 04/08/2025    NEUTROABS 2.89 04/08/2025    IGABSOL 0.00 04/08/2025    LYMPHSABS 0.84 04/08/2025    MONOSABS 0.43 04/08/2025    EOSABS 0.17 04/08/2025    BASOSABS 0.03 04/08/2025    RBC 4.95 04/08/2025    MCV 95 04/08/2025    MCHC 31.6 (L) 04/08/2025    HGB 14.8 04/08/2025    HCT 46.8 04/08/2025     04/08/2025     Lab Results   Component Value Date    CREATININE 0.86 04/08/2025    BUN 12 04/08/2025    EGFR 90 04/08/2025     04/08/2025    K 3.7 04/08/2025     04/08/2025    CO2 26 04/08/2025      Lab Results   Component Value Date    ALT 15 04/08/2025    AST 18 04/08/2025    ALKPHOS 80 04/08/2025    BILITOT 1.1 04/08/2025      Lab Results   Component Value Date    TSH 2.74 11/08/2024     Lab Results   Component Value Date    TSH 2.74 11/08/2024     Lab Results   Component Value Date    IRON 70 10/17/2024    TIBC 339 10/17/2024    FERRITIN 284 10/17/2024      Lab Results   Component Value Date    IEWRYZAZ85 807 10/17/2024      Lab Results   Component Value Date    FOLATE 11.6 10/17/2024     Lab Results   Component Value Date    GRICELDA Positive (A) 10/17/2024      Lab Results   Component Value Date     10/17/2024     Lab Results   Component Value Date    SPEP Normal. 04/03/2025     Lab Results   Component Value Date     04/08/2025    IGM 52 04/08/2025     04/08/2025     Assessment:     73 y.o. male with PMH s/f CAD, HFrEF newly diagnosed TTR amyloidosis referred by for elevated FLC.      Discussed that low level elevation of FLC in the setting of normal SPEP/UPEP is likely reactive/inflammatory and not related to AL amyloidosis. Patient with no neurological symptoms. PYP scan diagnostic for TTR amyloidosis and treatment is dictated by cardiology to prevent more TTR depositions. Patient anticipated to start Vyndamax tomorrow.    Plan:    Reviewed and discussed lab, imaging, and pathology results with patient in detail as well as diagnosis, prognosis, and treatment options.    FLC continues to be abnormal but improving; M spike normal; continue to monitor yearly    Plan to repeat marker in 6 months    F/U w/PCP, cardio, and heart failure    RTC in 12 months    Patient verbalized understanding, and all his questions were answered to his satisfaction    30 min spent with patient greater than 50 % of which was spent in consultation and coordination of care.

## 2025-04-09 LAB
KAPPA LC SERPL-MCNC: 2.36 MG/DL (ref 0.33–1.94)
KAPPA LC/LAMBDA SER: 1.43 {RATIO} (ref 0.26–1.65)
LAMBDA LC SERPL-MCNC: 1.65 MG/DL (ref 0.57–2.63)

## 2025-04-11 LAB
ALBUMIN: 4 G/DL (ref 3.4–5)
ALPHA 1 GLOBULIN: 0.3 G/DL (ref 0.2–0.6)
ALPHA 2 GLOBULIN: 0.7 G/DL (ref 0.4–1.1)
BETA GLOBULIN: 0.8 G/DL (ref 0.5–1.2)
GAMMA GLOBULIN: 0.9 G/DL (ref 0.5–1.4)
IMMUNOFIXATION COMMENT: NORMAL
PATH REVIEW - SERUM IMMUNOFIXATION: NORMAL
PATH REVIEW-SERUM PROTEIN ELECTROPHORESIS: NORMAL
PROTEIN ELECTROPHORESIS COMMENT: NORMAL

## 2025-04-18 ENCOUNTER — OFFICE VISIT (OUTPATIENT)
Dept: CARDIOLOGY | Facility: HOSPITAL | Age: 75
End: 2025-04-18
Payer: MEDICARE

## 2025-04-18 VITALS
SYSTOLIC BLOOD PRESSURE: 138 MMHG | WEIGHT: 154.8 LBS | HEART RATE: 41 BPM | OXYGEN SATURATION: 98 % | BODY MASS INDEX: 25.76 KG/M2 | RESPIRATION RATE: 16 BRPM | DIASTOLIC BLOOD PRESSURE: 65 MMHG

## 2025-04-18 DIAGNOSIS — I50.20 HFREF (HEART FAILURE WITH REDUCED EJECTION FRACTION): ICD-10-CM

## 2025-04-18 DIAGNOSIS — E85.4 CARDIAC AMYLOIDOSIS: ICD-10-CM

## 2025-04-18 DIAGNOSIS — I43 CARDIAC AMYLOIDOSIS: ICD-10-CM

## 2025-04-18 DIAGNOSIS — I10 BENIGN ESSENTIAL HYPERTENSION: ICD-10-CM

## 2025-04-18 PROCEDURE — 3078F DIAST BP <80 MM HG: CPT | Performed by: STUDENT IN AN ORGANIZED HEALTH CARE EDUCATION/TRAINING PROGRAM

## 2025-04-18 PROCEDURE — 1123F ACP DISCUSS/DSCN MKR DOCD: CPT | Performed by: STUDENT IN AN ORGANIZED HEALTH CARE EDUCATION/TRAINING PROGRAM

## 2025-04-18 PROCEDURE — 99215 OFFICE O/P EST HI 40 MIN: CPT | Performed by: STUDENT IN AN ORGANIZED HEALTH CARE EDUCATION/TRAINING PROGRAM

## 2025-04-18 PROCEDURE — 3075F SYST BP GE 130 - 139MM HG: CPT | Performed by: STUDENT IN AN ORGANIZED HEALTH CARE EDUCATION/TRAINING PROGRAM

## 2025-04-18 PROCEDURE — 1159F MED LIST DOCD IN RCRD: CPT | Performed by: STUDENT IN AN ORGANIZED HEALTH CARE EDUCATION/TRAINING PROGRAM

## 2025-04-18 RX ORDER — CARVEDILOL 6.25 MG/1
6.25 TABLET ORAL
Qty: 60 TABLET | Refills: 11 | Status: SHIPPED | OUTPATIENT
Start: 2025-04-18 | End: 2026-04-18

## 2025-04-18 RX ORDER — SPIRONOLACTONE 25 MG/1
25 TABLET ORAL DAILY
Qty: 30 TABLET | Refills: 11 | Status: SHIPPED | OUTPATIENT
Start: 2025-04-18 | End: 2026-04-18

## 2025-04-18 SDOH — ECONOMIC STABILITY: FOOD INSECURITY: WITHIN THE PAST 12 MONTHS, YOU WORRIED THAT YOUR FOOD WOULD RUN OUT BEFORE YOU GOT MONEY TO BUY MORE.: NEVER TRUE

## 2025-04-18 SDOH — ECONOMIC STABILITY: FOOD INSECURITY: WITHIN THE PAST 12 MONTHS, THE FOOD YOU BOUGHT JUST DIDN'T LAST AND YOU DIDN'T HAVE MONEY TO GET MORE.: NEVER TRUE

## 2025-04-18 ASSESSMENT — ENCOUNTER SYMPTOMS
OCCASIONAL FEELINGS OF UNSTEADINESS: 0
LOSS OF SENSATION IN FEET: 0
DEPRESSION: 0

## 2025-04-18 ASSESSMENT — PATIENT HEALTH QUESTIONNAIRE - PHQ9
SUM OF ALL RESPONSES TO PHQ9 QUESTIONS 1 AND 2: 0
2. FEELING DOWN, DEPRESSED OR HOPELESS: NOT AT ALL
1. LITTLE INTEREST OR PLEASURE IN DOING THINGS: NOT AT ALL

## 2025-04-18 ASSESSMENT — COLUMBIA-SUICIDE SEVERITY RATING SCALE - C-SSRS
2. HAVE YOU ACTUALLY HAD ANY THOUGHTS OF KILLING YOURSELF?: NO
1. IN THE PAST MONTH, HAVE YOU WISHED YOU WERE DEAD OR WISHED YOU COULD GO TO SLEEP AND NOT WAKE UP?: NO

## 2025-04-18 NOTE — PROGRESS NOTES
Referring Clinician: Dr. Ovalles  Accompanied by: wife     HPI:      74 y.o. retired  (retired 2014) who presents for advanced heart failure care.  He has a past medical history significant for hypertension, hyperlipidemia, mild aortic regurgitation, history of PVCs,LBBB, nonobstructive coronary disease and on [facility] 8/2024 found to have 40%LAD 50%  mLCx patent  mRCA stent with 40% dRCA stenosis which is medically managed.  He has a history of HFmrEF and on TTE 7/2024 LVEF 40-45% LVIDD 5.7 cm and changes consistent with restrictive cardiomyopathy, normal right ventricular systolic function, mild to moderate mitral regurgitation, mild aortic regurgitation.  For HFmrEF he is currently maintained on sacubitril/valsartan, empagliflozin, and carvedilol.  He also has wild-type ATTR amyloidosis (has been reviewed by our genetics team 2025) and is maintained on tafamidis.  He was noted to have an elevation in Igkappa levels and is being seen by the hematology team as well    Functionally he is unlimited on a flat surface and can climb a flight of stairs without exertional symptoms.  He denies chest pain, SOB at rest, SOBOE, orthopnoea, PND,  bendopnoea, syncope, pre syncope, palpitations, or leg swelling.  He does note that his heart rate has been dipping into the 40s when he is more noted at home.    Notably, he is not taking aspirin, he found it caused skin bruising which he did not like.  He is maintained on clopidogrel.     He is fully adherent with prescribed medications previously had trouble with medication cost but our pharmacy team has been very helpful with this and he is now able to afford his meds.     Surgical Hx:  -Shoulder surgery  -Hemorrhoid surgery  - 3 hip replacement surgeries     Social Hx:  - Smoking- quit at age ~ 33 years  - ETOH- nil now,  never heavy drinker  - Illicit drugs- never  - Lives with wife, and occasional foster children.  Feels safe at home  - 3 healthy adult children     Family  "Hx:  Specifically, there is no family history of  CAD, heart failure, ICD, PPM, LVAD, OHT, arrhythmias, CVA, or sudden cardiac death.     Father - \" enlarged heart\", emphysema   Sister -  heart disease      Medication reconciliation completed, see below.      Medication Documentation Review Audit       Reviewed by Shirley Ellis RN (Registered Nurse) on 04/18/25 at 1255      Medication Order Taking? Sig Documenting Provider Last Dose Status   atorvastatin (Lipitor) 40 mg tablet [id number] Yes TAKE 1 TABLET BY MOUTH AT BEDTIME Ruth Willoughby, APRN-CNP  Active   carvedilol (Coreg) 12.5 mg tablet [id number] Yes Take 1 tablet (12.5 mg) by mouth 2 times a day after meals. David Ward, DO  Active   clopidogrel (Plavix) 75 mg tablet [id number] Yes Take 1 tablet (75 mg) by mouth once daily. David Ward, DO  Active   cyanocobalamin, vitamin B-12, 1,000 mcg tablet, sublingual [id number] Yes DISSOLVE 1 TABLET UNDER THE TONGUE ONCE A DAY Historical Provider, MD  Active   empagliflozin (Jardiance) 10 mg [id number] Yes Take 1 tablet (10 mg) by mouth once daily. Karen Hammer MD PhD  Active   fexofenadine (Allegra) 180 mg tablet [id number] Yes Take 1 tablet (180 mg) by mouth once daily. Historical Provider, MD  Active   pedi multivit no.17 w-fluoride (Poly-Vi-Liane) 0.5 mg tablet,chewable [id number] Yes Chew 1 tablet once daily. Historical Provider, MD  Active   sacubitriL-valsartan (Entresto) 49-51 mg tablet [id number] Yes Take 1 tablet by mouth 2 times a day. Karen Hammer MD PhD  Active   spironolactone (Aldactone) 25 mg tablet [id number] Yes Take 1 tablet (25 mg) by mouth once daily. Karen Hammer MD PhD  Active   tafamidis (Vyndamax) 61 mg capsule [id number] Yes Take 1 capsule (61 mg) by mouth once daily. Karen Hammer MD PhD  Active                   RX Allergies[1]    ROS   Negative except for as detailed in HPI      Investigations:     The electronic medical record has been reviewed by me for salient history. " All cardiovascular imaging and testing available in the electronic medical record, and [employer] has been reviewed.    Visit Vitals  /65 (BP Location: Left arm, Patient Position: Sitting, BP Cuff Size: Adult)   Pulse (!) 41   Resp 16   Wt 70.2 kg (154 lb 12.8 oz)   SpO2 98%   BMI 25.76 kg/m²   Smoking Status Former   BSA 1.79 m²         On examination:     Very pleasant elderly  man in no apparent CP or painful distress  Very well groomed   Neck: No JVD or HJR  CVS: HS 1,2.  Ectopic beats.  No added sounds  Resp: CTA bilaterally. Percussion note resonant  Abdomen: Flat, SNT, BS wnl  Extremities: No pedal oedema  Skin: warm and dry  CNS: AO x 4, no gross deficits       Lab Results   Component Value Date    WBC 4.4 04/08/2025    HGB 14.8 04/08/2025    HCT 46.8 04/08/2025     04/08/2025    CHOL 140 11/08/2024    TRIG 180 (H) 11/08/2024    HDL 37.3 11/08/2024    LDLDIRECT 86 09/17/2021    ALT 15 04/08/2025    AST 18 04/08/2025     04/08/2025    K 3.7 04/08/2025     04/08/2025    CREATININE 0.86 04/08/2025    BUN 12 04/08/2025    CO2 26 04/08/2025    TSH 2.74 11/08/2024    PSA 5.01 (H) 12/05/2023    HGBA1C 6.7 (A) 10/21/2024     Transthoracic Echo (TTE) Complete     [place] [id number]  Phone [phone] Fax [phone]     TRANSTHORACIC ECHOCARDIOGRAM REPORT     Patient Name:      MAIKEL Ignacio Physician:    [id number] Albaro Ovalles DO  Study Date:        7/11/2024            Ordering Provider:    [id number] ALBARO OVALLES  MRN/PID:           [id number]             Fellow:  Accession#:        [id number]         Nurse:  Date of Birth/Age: 1950 / 73 years Sonographer:          Shruti De Oliveira RDCS  Gender:            M                    Additional Staff:  Height:            167.64 cm            Admit Date:           7/11/2024  Weight:            70.31 kg             Admission Status:     Outpatient  BSA / BMI:         1.79 m2 /  25.02      Department Location:  [facility] Echo  kg/m2                                      Lab  Blood Pressure: 162 /84 mmHg     Study Type:    TRANSTHORACIC ECHO (TTE) COMPLETE  Diagnosis/ICD: Ventricular premature depolarization-I49.3; Atherosclerotic heart  disease of native coronary artery without angina pectoris-I25.10;  Unspecified systolic (congestive) heart failure (CHF)-I50.20;  Essential (primary) hypertension-I10  Indication:    Abnormal EKG, CAD, HTN  CPT Codes:     Echo Complete w Full Doppler-30889  Study Detail: The following Echo studies were performed: 2D, M-Mode, Doppler and  color flow. Patient's heart rhythm is with premature ventricular  contractions.        PHYSICIAN INTERPRETATION:  Left Ventricle: The left ventricular systolic function is mildly decreased, with a visually estimated ejection fraction of 40-45%. There is global hypokinesis of the left ventricle with minor regional variations. The left ventricular cavity size is mildly dilated. The left ventricular septal wall thickness is moderately increased. There is mildly increased left ventricular posterior wall thickness. Left Ventricular Global Longitudinal Strain - 8.4 %. Spectral Doppler shows an impaired relaxation pattern of left ventricular diastolic filling. Reduced LV strain at -8.4% with apical sparing pattern which can be seen in restrictive cardiomyopathies such as cardiac amyloidosis.  Left Atrium: The left atrium is normal in size.  Right Ventricle: The right ventricle is normal in size. There is normal right ventricular global systolic function.  Right Atrium: The right atrium is normal in size.  Aortic Valve: The aortic valve is probably trileaflet. There is mild aortic valve thickening. The aortic valve dimensionless index is 0.93. There is mild aortic valve regurgitation. The peak instantaneous gradient of the aortic valve is 11.5 mmHg. The mean gradient of the aortic valve is 6.0 mmHg.  Mitral Valve: The mitral valve  is mildly thickened. There is mild mitral annular calcification. There is mild to moderate mitral valve regurgitation.  Tricuspid Valve: The tricuspid valve is structurally normal. There is mild tricuspid regurgitation. The Doppler estimated RVSP is slightly elevated at 34.9 mmHg.  Pulmonic Valve: The pulmonic valve is structurally normal. There is physiologic pulmonic valve regurgitation.  Pericardium: There is no pericardial effusion noted.  Aorta: The aortic root is normal.        CONCLUSIONS:  1. The left ventricular systolic function is mildly decreased, with a visually estimated ejection fraction of 40-45%.  2. There is global hypokinesis of the left ventricle with minor regional variations.  3. Spectral Doppler shows an impaired relaxation pattern of left ventricular diastolic filling.  4. Left ventricular cavity size is mildly dilated.  5. Moderately increased left ventricular septal thickness.  6. Reduced LV strain at -8.4% with apical sparing pattern which can be seen in restrictive cardiomyopathies such as cardiac amyloidosis.  7. There is normal right ventricular global systolic function.  8. Mild to moderate mitral valve regurgitation.  9. Slightly elevated RVSP.  10. Mild aortic valve regurgitation.     QUANTITATIVE DATA SUMMARY:  2D MEASUREMENTS:  Normal Ranges:  IVSd:          1.61 cm    (0.6-1.1cm)  LVPWd:         1.16 cm    (0.6-1.1cm)  LVIDd:         5.72 cm    (3.9-5.9cm)  LVIDs:         4.74 cm  LV Mass Index: 197.8 g/m2  LV % FS        17.2 %     LA VOLUME:  Normal Ranges:  LA Vol A4C:        59.1 ml    (22+/-6mL/m2)  LA Vol A2C:        62.5 ml  LA Vol BP:         62.5 ml  LA Vol Index A4C:  32.9 ml/m2  LA Vol Index A2C:  34.9 ml/m2  LA Vol Index BP:   34.8 ml/m2  LA Area A4C:       19.2 cm2  LA Area A2C:       20.3 cm2  LA Major Axis A4C: 5.3 cm  LA Major Axis A2C: 5.6 cm  LA Volume Index:   32.2 ml/m2  LA Vol A4C:        55.4 ml  LA Vol A2C:        59.4 ml     RA VOLUME BY A/L METHOD:  Normal  Ranges:  RA Area A4C: 17.0 cm2     M-MODE MEASUREMENTS:  Normal Ranges:  Ao Root: 3.40 cm (2.0-3.7cm)  AoV Exc: 2.00 cm (1.5-2.5cm)     AORTA MEASUREMENTS:  Normal Ranges:  AoV Exc:     2.00 cm (1.5-2.5cm)  Ao Sinus, d: 2.90 cm (2.1-3.5cm)  Ao STJ, d:   2.30 cm (1.7-3.4cm)  Asc Ao, d:   3.30 cm (2.1-3.4cm)     LV SYSTOLIC FUNCTION BY 2D PLANIMETRY (MOD):  Normal Ranges:  EF-A4C View:                      57 % (>=55%)  EF-A2C View:                      59 %  EF-Biplane:                       58 %  EF-Visual:                        43 %  LV EF Reported:                   43 %  Global Longitudinal Strain (GLS): 8 %     LV DIASTOLIC FUNCTION:  Normal Ranges:  MV Peak E:    0.60 m/s    (0.7-1.2 m/s)  MV Peak A:    1.05 m/s    (0.42-0.7 m/s)  E/A Ratio:    0.57        (1.0-2.2)  MV e'         0.056 m/s   (>8.0)  MV lateral e' 0.06 m/s  MV medial e'  0.05 m/s  MV A Dur:     171.27 msec  E/e' Ratio:   10.68       (<8.0)     MITRAL VALVE:  Normal Ranges:  MV DT: 259 msec (150-240msec)     AORTIC VALVE:  Normal Ranges:  AoV Vmax:                1.69 m/s  (<=1.7m/s)  AoV Peak P.5 mmHg (<20mmHg)  AoV Mean P.0 mmHg  (1.7-11.5mmHg)  LVOT Max Elder:            1.34 m/s  (<=1.1m/s)  AoV VTI:                 30.24 cm  (18-25cm)  LVOT VTI:                28.22 cm  LVOT Diameter:           2.04 cm   (1.8-2.4cm)  AoV Area, VTI:           3.06 cm2  (2.5-5.5cm2)  AoV Area,Vmax:           2.59 cm2  (2.5-4.5cm2)  AoV Dimensionless Index: 0.93     AORTIC INSUFFICIENCY:  AI Vmax:       3.76 m/s  AI Half-time:  819 msec  AI Decel Time: 2823 msec  AI Decel Rate: 133.03 cm/s2        RIGHT VENTRICLE:  RV Basal 3.59 cm  RV Mid   3.10 cm  RV Major 6.4 cm  TAPSE:   22.1 mm  RV s'    0.11 m/s     TRICUSPID VALVE/RVSP:  Normal Ranges:  Peak TR Velocity: 2.83 m/s  RV Syst Pressure: 34.9 mmHg (< 30mmHg)  TV e'             0.1 m/s     AORTA:  Asc Ao Diam 3.26 cm     IMPRESSION:     74 y.o. retired  (retired ) who  presents for advanced heart failure care.  He has a past medical history significant for hypertension, hyperlipidemia, mild aortic regurgitation, history of PVCs,LBBB, nonobstructive coronary disease and on C 8/2024 found to have 40%LAD 50%  mLCx patent  mRCA stent with 40% dRCA stenosis which is medically managed.  He has a history of HFmrEF and on TTE 7/2024 LVEF 40-45% LVIDD 5.7 cm and changes consistent with restrictive cardiomyopathy, normal right ventricular systolic function, mild to moderate mitral regurgitation, mild aortic regurgitation.  For HFmrEF he is currently maintained on sacubitril/valsartan, empagliflozin, and carvedilol.  He also has wild-type ATTR amyloidosis (has been reviewed by our genetics team 2025) and is maintained on tafamidis.  He was noted to have an elevation in Igkappa levels and is being seen by the hematology team as well    NYHA Functional Class: 2  ACC/AHA Stage B heart failure  Volume status: Euvolaemic  Perfusion status: Warm to touch  Aetiology: Cardiac amyloidosis, wild type ATTR     PLAN:  # Wild-type ATTR amyloidosis  -Continue tafamidis  -We did discuss vutrisiran initiation at length, he prefers to hold off at this time   -For HFrEF he will be continued on empagliflozin, sacubitril/valsartan  - Spironolactone will be doubled to 25 mg once daily, renal function monitoring  -He has been bradycardic, with a diagnosis of amyloidosis.  Carvedilol will be reduced by half to 6.25 mg twice daily    #Follow-up  Continue clinical cardiovascular care with Dr. Ovalles  Heart failure follow-up with Dr. Hammer    This note was transcribed using the Dragon Dictation system. There may be grammatical, punctuation, or verbiage errors that can occur with voice recognition programs.     Karen Hammer MD PhD       [1]   Allergies  Allergen Reactions    Aspirin Unknown    Penicillins Other and Unknown    Statins-Hmg-Coa Reductase Inhibitors Unknown

## 2025-04-18 NOTE — PATIENT INSTRUCTIONS
Thank you for coming in today. If you have any questions or concerns, you may call the Heart Failure Office at [phone] option [phone], or [phone].  You may also contact our heart failure nursing team via email on [email or web address].    For quicker results set-up your  [facility] [employer] account to receive results and other correspondence directly to your phone.    Please bring all your pills/medications to your Cardiology appointments.    **  - Please make the following medication changes:  1. DECREASE Carvedilol 6.25 mg twice daily    2. INCREASE Spironolactone 25 mg once daily    -  Please have the following tests done, CALL Tel  [phone] to schedule:  1.  Echocardiogram in 4 months    2.  Blood tests in 1 week(  RFP, BNP, CBC)    3.  Blood tests  in 4 weeks ( RFP, BNP, CBC)    - Please make an appointment to be seen in 4-5 months

## 2025-04-22 DIAGNOSIS — E78.2 MIXED HYPERLIPIDEMIA: ICD-10-CM

## 2025-04-23 ENCOUNTER — APPOINTMENT (OUTPATIENT)
Dept: PRIMARY CARE | Facility: CLINIC | Age: 75
End: 2025-04-23
Payer: MEDICARE

## 2025-04-23 VITALS
DIASTOLIC BLOOD PRESSURE: 66 MMHG | WEIGHT: 154 LBS | OXYGEN SATURATION: 98 % | BODY MASS INDEX: 25.66 KG/M2 | HEART RATE: 62 BPM | SYSTOLIC BLOOD PRESSURE: 134 MMHG | TEMPERATURE: 96.8 F | HEIGHT: 65 IN

## 2025-04-23 DIAGNOSIS — N40.1 BENIGN PROSTATIC HYPERPLASIA (BPH) WITH STRAINING ON URINATION: ICD-10-CM

## 2025-04-23 DIAGNOSIS — I25.10 CORONARY ARTERY DISEASE INVOLVING NATIVE HEART WITHOUT ANGINA PECTORIS, UNSPECIFIED VESSEL OR LESION TYPE: ICD-10-CM

## 2025-04-23 DIAGNOSIS — Z15.09 BRCA1 GENE MUTATION POSITIVE IN MALE: ICD-10-CM

## 2025-04-23 DIAGNOSIS — I50.20 HFREF (HEART FAILURE WITH REDUCED EJECTION FRACTION): ICD-10-CM

## 2025-04-23 DIAGNOSIS — E78.00 PURE HYPERCHOLESTEROLEMIA: ICD-10-CM

## 2025-04-23 DIAGNOSIS — E85.4 CARDIAC AMYLOIDOSIS: ICD-10-CM

## 2025-04-23 DIAGNOSIS — I43 CARDIAC AMYLOIDOSIS: ICD-10-CM

## 2025-04-23 DIAGNOSIS — Z15.01 BRCA1 GENE MUTATION POSITIVE IN MALE: ICD-10-CM

## 2025-04-23 DIAGNOSIS — R39.16 BENIGN PROSTATIC HYPERPLASIA (BPH) WITH STRAINING ON URINATION: ICD-10-CM

## 2025-04-23 DIAGNOSIS — I50.1 LEFT VENTRICULAR FAILURE (MULTI): ICD-10-CM

## 2025-04-23 DIAGNOSIS — Z00.00 MEDICARE ANNUAL WELLNESS VISIT, SUBSEQUENT: Primary | ICD-10-CM

## 2025-04-23 DIAGNOSIS — Z15.03 BRCA1 GENE MUTATION POSITIVE IN MALE: ICD-10-CM

## 2025-04-23 DIAGNOSIS — I25.10 ATHEROSCLEROSIS OF NATIVE CORONARY ARTERY OF NATIVE HEART WITHOUT ANGINA PECTORIS: ICD-10-CM

## 2025-04-23 DIAGNOSIS — I47.20 VENTRICULAR TACHYCARDIA, UNSPECIFIED (MULTI): ICD-10-CM

## 2025-04-23 DIAGNOSIS — I44.7 LEFT BUNDLE BRANCH BLOCK (LBBB): ICD-10-CM

## 2025-04-23 DIAGNOSIS — I25.5 ISCHEMIC CARDIOMYOPATHY: ICD-10-CM

## 2025-04-23 DIAGNOSIS — E55.9 VITAMIN D DEFICIENCY: ICD-10-CM

## 2025-04-23 DIAGNOSIS — R97.20 ELEVATED PSA: ICD-10-CM

## 2025-04-23 DIAGNOSIS — I10 BENIGN ESSENTIAL HYPERTENSION: ICD-10-CM

## 2025-04-23 DIAGNOSIS — E11.9 TYPE 2 DIABETES MELLITUS WITHOUT COMPLICATION, WITHOUT LONG-TERM CURRENT USE OF INSULIN: ICD-10-CM

## 2025-04-23 DIAGNOSIS — I42.9 CARDIOMYOPATHY, UNSPECIFIED TYPE (MULTI): ICD-10-CM

## 2025-04-23 DIAGNOSIS — E53.8 VITAMIN B 12 DEFICIENCY: ICD-10-CM

## 2025-04-23 LAB — POC HEMOGLOBIN A1C: 6.3 % (ref 4.2–6.5)

## 2025-04-23 PROCEDURE — 1036F TOBACCO NON-USER: CPT | Performed by: FAMILY MEDICINE

## 2025-04-23 PROCEDURE — 83036 HEMOGLOBIN GLYCOSYLATED A1C: CPT | Performed by: FAMILY MEDICINE

## 2025-04-23 PROCEDURE — 3078F DIAST BP <80 MM HG: CPT | Performed by: FAMILY MEDICINE

## 2025-04-23 PROCEDURE — 1170F FXNL STATUS ASSESSED: CPT | Performed by: FAMILY MEDICINE

## 2025-04-23 PROCEDURE — 99214 OFFICE O/P EST MOD 30 MIN: CPT | Performed by: FAMILY MEDICINE

## 2025-04-23 PROCEDURE — 1159F MED LIST DOCD IN RCRD: CPT | Performed by: FAMILY MEDICINE

## 2025-04-23 PROCEDURE — G0439 PPPS, SUBSEQ VISIT: HCPCS | Performed by: FAMILY MEDICINE

## 2025-04-23 PROCEDURE — 3075F SYST BP GE 130 - 139MM HG: CPT | Performed by: FAMILY MEDICINE

## 2025-04-23 PROCEDURE — 1123F ACP DISCUSS/DSCN MKR DOCD: CPT | Performed by: FAMILY MEDICINE

## 2025-04-23 ASSESSMENT — ACTIVITIES OF DAILY LIVING (ADL)
DRESSING: INDEPENDENT
BATHING: INDEPENDENT
DOING_HOUSEWORK: INDEPENDENT
GROCERY_SHOPPING: INDEPENDENT
TAKING_MEDICATION: INDEPENDENT
MANAGING_FINANCES: INDEPENDENT

## 2025-04-23 NOTE — PROGRESS NOTES
Subjective   Patient ID: Tre Sumner is a 74 y.o. male who presents for Medicare Annual Wellness Visit Subsequent.         Reviewed all medications by prescribing practitioner or clinical pharmacist (such as prescriptions, OTCs, herbal therapies and supplements) and documented in the medical record.    HPI  1.  Physical exam.  Colonoscopy: last done 2020, 5 year clearance  Immunizations: NA  Reviewed chronic medical conditions     Concerns today: Dermatology needs to see and told has BRCA1  gene and wqill see Dr Cantu.    Getting Echo in 4 months     In general the patient states that his health is:  good     Regular dental visits: Has dentures  Vision problems: has dry eyes and sees Dr Urena.  Eye exam 9/2024  Hearing loss:  Has hearing aides.       Diet: Eating less  Exercise:  He is physically active and goes to the gym  Weight concerns: no  Sleep: Sleep is good with benadryl  Caffeine: 2 cups coffee  Water: good intake     Tobacco use:no   Alcohol use:no  Illicit drug use:no     Colon cancer screening:  Last done 2020 with Dr. Schmid and due 10/2025  Colonoscopy       2. 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 GERD  Worsening with new medications   Takes generic antacid, helps sometimes     4. Diabetes   POCT HbA1c 6.8%      Review of Systems  All pertinent positive symptoms are included in the history of present illness.    All other systems have been reviewed and are negative and noncontributory to this patient's current ailments.    Past Medical History:   Diagnosis Date    Abnormal electrocardiogram (ECG) (EKG)     Abnormal EKG    Encounter for screening for malignant neoplasm of prostate 02/16/2021    Encounter for prostate cancer screening    Other conditions influencing health status 01/05/2022    History of cough    Personal history of other diseases of the musculoskeletal system and  connective tissue     History of osteoarthritis     Past Surgical History:   Procedure Laterality Date    CARDIAC CATHETERIZATION N/A 2024    Procedure: Left Heart Cath;  Surgeon: Reyes Cabrera MD;  Location: [facility] Cardiac Cath Lab;  Service: Cardiovascular;  Laterality: N/A;    CARDIAC CATHETERIZATION N/A 2024    Procedure: FFR (Fractional Flow [place]);  Surgeon: Reyes Cabrera MD;  Location: [facility] Cardiac Cath Lab;  Service: Cardiovascular;  Laterality: N/A;    HEMORRHOID SURGERY  2016    Hemorrhoidectomy    OTHER SURGICAL HISTORY  2016    Hip Replacement Left    OTHER SURGICAL HISTORY  2016    Hip Joint Arthrodesis Right    SHOULDER SURGERY  2016    Shoulder Surgery     Social History     Tobacco Use    Smoking status: Former     Current packs/day: 0.00     Types: Cigarettes     Quit date:      Years since quittin.3    Smokeless tobacco: Never   Substance Use Topics    Alcohol use: Never    Drug use: Never     No family history on file.  Immunization History   Administered Date(s) Administered    Pneumococcal polysaccharide vaccine, 23-valent, age 2 years and older (PNEUMOVAX 23) 10/19/2021     Current Outpatient Medications   Medication Instructions    atorvastatin (LIPITOR) 40 mg, oral, Nightly    carvedilol (COREG) 6.25 mg, oral, 2 times daily after meals    clopidogrel (PLAVIX) 75 mg, oral, Daily    cyanocobalamin, vitamin B-12, 1,000 mcg tablet, sublingual DISSOLVE 1 TABLET UNDER THE TONGUE ONCE A DAY    fexofenadine (Allegra) 180 mg tablet 1 tablet, Daily    Jardiance 10 mg, oral, Daily    pedi multivit no.17 w-fluoride (Poly-Vi-Liane) 0.5 mg tablet,chewable 1 tablet, Daily    sacubitriL-valsartan (Entresto) 49-51 mg tablet 1 tablet, oral, 2 times daily    spironolactone (ALDACTONE) 25 mg, oral, Daily    Vyndamax 61 mg, oral, Daily     Allergies   Allergen Reactions    Aspirin Unknown    Penicillins Other and Unknown    Statins-Hmg-Coa Reductase Inhibitors  "Unknown       Objective   Vitals:    04/23/25 1310   BP: 134/66   BP Location: Left arm   Patient Position: Sitting   BP Cuff Size: Adult   Pulse: 62   Temp: 36 °C (96.8 °F)   SpO2: 98%   Weight: 69.9 kg (154 lb)   Height: 1.651 m (5' 5\")     Body mass index is 25.63 kg/m².    BP Readings from Last 3 Encounters:   04/23/25 134/66   04/18/25 138/65   04/08/25 145/79      Wt Readings from Last 3 Encounters:   04/23/25 69.9 kg (154 lb)   04/18/25 70.2 kg (154 lb 12.8 oz)   04/08/25 71.5 kg (157 lb 8.3 oz)        Office Visit on 04/08/2025   Component Date Value    WBC 04/08/2025 4.4     RBC 04/08/2025 4.95     Hemoglobin 04/08/2025 14.8     Hematocrit 04/08/2025 46.8     MCV 04/08/2025 95     MCH 04/08/2025 29.9     MCHC 04/08/2025 31.6 (L)     RDW 04/08/2025 14.3     Platelets 04/08/2025 214     Neutrophils % 04/08/2025 66.2     Immature Granulocytes %,* 04/08/2025 0.0     Lymphocytes % 04/08/2025 19.3     Monocytes % 04/08/2025 9.9     Eosinophils % 04/08/2025 3.9     Basophils % 04/08/2025 0.7     Neutrophils Absolute 04/08/2025 2.89     Immature Granulocytes Ab* 04/08/2025 0.00     Lymphocytes Absolute 04/08/2025 0.84     Monocytes Absolute 04/08/2025 0.43     Eosinophils Absolute 04/08/2025 0.17     Basophils Absolute 04/08/2025 0.03     Glucose 04/08/2025 168 (H)     Sodium 04/08/2025 139     Potassium 04/08/2025 3.7     Chloride 04/08/2025 105     Bicarbonate 04/08/2025 26     Anion Gap 04/08/2025 12     Urea Nitrogen 04/08/2025 12     Creatinine 04/08/2025 0.86     eGFR 04/08/2025 90     Calcium 04/08/2025 9.1     Albumin 04/08/2025 4.1     Alkaline Phosphatase 04/08/2025 80     Total Protein 04/08/2025 6.6     AST 04/08/2025 18     Bilirubin, Total 04/08/2025 1.1     ALT 04/08/2025 15     IgG 04/08/2025 904     IgA 04/08/2025 215     IgM 04/08/2025 52     Ig [place] Free Light Chain 04/08/2025 2.36 (H)     Ig Lambda Free Light Guerita* 04/08/2025 1.65     Kappa/Lambda Ratio 04/08/2025 1.43     Total Protein " 04/08/2025 6.7     Albumin 04/08/2025 4.0     Alpha 1 Globulin 04/08/2025 0.3     Alpha 2 Globulin 04/08/2025 0.7     Beta Globulin 04/08/2025 0.8     Gamma 04/08/2025 0.9     Protein Electrophoresis * 04/08/2025 Normal.     Immunofixation Comment 04/08/2025 No monoclonal protein detected by immunofixation.     Path Review - Serum Prot* 04/08/2025 Reviewed and approved by CONSTANCE ESCALANTE on 4/11/25 at 6:43 PM.         Path Review - Serum Immu* 04/08/2025 Reviewed and approved by CONSTANCE ESCALANTE on 4/11/25 at 6:43 PM.        Office Visit on 04/07/2025   Component Date Value    Ventricular Rate 04/07/2025 64     Atrial Rate 04/07/2025 64     LA Interval 04/07/2025 156     QRS Duration 04/07/2025 148     QT Interval 04/07/2025 440     QTC Calculation(Bazett) 04/07/2025 453     P Axis 04/07/2025 38     R Axis 04/07/2025 5     T [place] 04/07/2025 175     QRS Count 04/07/2025 10     Q Onset 04/07/2025 208     P Onset 04/07/2025 130     P Offset 04/07/2025 186     T Offset 04/07/2025 428     QTC Fredericia 04/07/2025 449    Lab on 04/03/2025   Component Date Value    Ig Kappa Free Light Chain 04/03/2025 2.24 (H)     Ig Lambda Free Light Guerita* 04/03/2025 1.64     Kappa/Lambda Ratio 04/03/2025 1.37     Glucose 04/03/2025 102 (H)     Sodium 04/03/2025 140     Potassium 04/03/2025 4.7     Chloride 04/03/2025 102     Bicarbonate 04/03/2025 27     Anion Gap 04/03/2025 16     Urea Nitrogen 04/03/2025 17     Creatinine 04/03/2025 0.90     eGFR 04/03/2025 89     Calcium 04/03/2025 9.4     Albumin 04/03/2025 4.1     Alkaline Phosphatase 04/03/2025 84     Total Protein 04/03/2025 6.5     AST 04/03/2025 19     Bilirubin, Total 04/03/2025 1.6 (H)     ALT 04/03/2025 14     IgG 04/03/2025 921     IgA 04/03/2025 207     IgM 04/03/2025 52     WBC 04/03/2025 4.9     nRBC 04/03/2025 0.0     RBC 04/03/2025 5.28     Hemoglobin 04/03/2025 15.7     Hematocrit 04/03/2025 48.8     MCV 04/03/2025 92     MCH 04/03/2025 29.7     MCHC 04/03/2025 32.2      RDW 04/03/2025 14.7 (H)     Platelets 04/03/2025 223     Neutrophils % 04/03/2025 64.4     Immature Granulocytes %,* 04/03/2025 0.2     Lymphocytes % 04/03/2025 19.1     Monocytes % 04/03/2025 12.0     Eosinophils % 04/03/2025 3.5     Basophils % 04/03/2025 0.8     Neutrophils Absolute 04/03/2025 3.17     Immature Granulocytes Ab* 04/03/2025 0.01     Lymphocytes Absolute 04/03/2025 0.94     Monocytes Absolute 04/03/2025 0.59     Eosinophils Absolute 04/03/2025 0.17     Basophils Absolute 04/03/2025 0.04     Total Protein 04/03/2025 6.9     Albumin 04/03/2025 4.1     Alpha 1 Globulin 04/03/2025 0.3     Alpha 2 Globulin 04/03/2025 0.7     Beta Globulin 04/03/2025 0.8     Gamma 04/03/2025 0.9     Protein Electrophoresis * 04/03/2025 Normal.     Immunofixation Comment 04/03/2025 No monoclonal protein detected by immunofixation.     Path Review - Serum Prot* 04/03/2025 Reviewed and approved by CONSTANCE ESCALANTE on 4/7/25 at 9:34 PM.         Path Review - Serum Immu* 04/03/2025 Reviewed and approved by CONSTANCE ESCALANTE on 4/7/25 at 9:34 PM.          Physical Exam  General: Pt is sitting up in chair. Appears well-nourished. In no acute distress.  HENT: TM clear bilaterally. Oropharynx without erythema or exudate.  Neck: No cervical lymphadenopathy. No thyromegaly. No carotid bruits.  CV: S1S2 normal. Regular rate and rhythm. No murmurs, rubs, or gallops.  Resp: Clear to auscultation in all lobes. Good aeration. No rales, rhonchi, or wheezes.  GI: Soft, no tenderness to palpation. No organomegaly. No abdominal bruits.   Extremities: No lower extremity edema bilaterally.   Skin: Warm and dry. No rashes or bruising.   Psych: Appropriate responses and actions.      Assessment/Plan   Problem List Items Addressed This Visit       CAD (coronary artery disease)    Benign essential hypertension    Relevant Orders    CBC and Auto Differential    Comprehensive Metabolic Panel    Vitamin B12    PSA    Lipid Panel    BPH (benign prostatic  hyperplasia)    Cardiomyopathy    Relevant Orders    CBC and Auto Differential    Comprehensive Metabolic Panel    Vitamin B12    PSA    Lipid Panel    Diabetes mellitus, type II (Multi)    Relevant Orders    POCT glycosylated hemoglobin (Hb A1C) manually resulted    Hemoglobin A1C    Elevated PSA    Relevant Orders    CBC and Auto Differential    Comprehensive Metabolic Panel    Vitamin B12    PSA    Lipid Panel    Hyperlipidemia    Relevant Orders    CBC and Auto Differential    Comprehensive Metabolic Panel    Vitamin B12    PSA    Lipid Panel    Left bundle branch block (LBBB)    Left ventricular failure (Multi)    Vitamin B 12 deficiency    HFrEF (heart failure with reduced ejection fraction)     Other Visit Diagnoses         Medicare annual wellness visit, subsequent    -  Primary      Vitamin D deficiency        Relevant Orders    Vitamin D 25-Hydroxy,Total (for eval of Vitamin D levels)      Atherosclerosis of native coronary artery of native heart without angina pectoris          Cardiac amyloidosis          BRCA1 gene mutation positive in male            The patient is a 73 y/o male with multiple ,medical problems  Reveiwed preventive screenings    Diabetes mellitus: Controlled  - continue Jardiance 10 mg 1 tab daily, Hemoglobin A1c 6.3  - Microalbumin was normal            -   Eye exam March 2024  -On a statin and on angiotensin receptor blocker  - Denies any cut scrapes or sores on the feet or any burning numbness tingling in the hands of the feet     Hypertension- stable  -Continue medication Entresto and Spirnolactone and Carvedilol 6.25 mg BID.      Hyperlipidemia- stable  - Atorvastatin 40 mg daily currently taken and tolerated  -Continue CoQ10 200 mg              BPH and elevated PSA- stable  - Have had patient see Urology Dr Joseph.  Is supposed to follow up   -  Patient will need to follow up for possible procedure  - Has hesitancy and decreased flow,Not applicable             Cardiac amyloidosis             - On Vyndamax             Heart failure rEF           - Stable on current meds  - Dr. Hammer              BRCA1 positive  - [facility]  [facility]  - Dermatology referral was done to Dr. Cantu  -      Follow up in 6 months or as needed

## 2025-04-24 ENCOUNTER — SPECIALTY PHARMACY (OUTPATIENT)
Dept: PHARMACY | Facility: CLINIC | Age: 75
End: 2025-04-24

## 2025-04-24 PROCEDURE — RXMED WILLOW AMBULATORY MEDICATION CHARGE

## 2025-04-25 ENCOUNTER — TELEPHONE (OUTPATIENT)
Dept: CARDIOLOGY | Facility: HOSPITAL | Age: 75
End: 2025-04-25
Payer: MEDICARE

## 2025-04-25 DIAGNOSIS — E85.4 CARDIAC AMYLOIDOSIS: ICD-10-CM

## 2025-04-25 DIAGNOSIS — I43 CARDIAC AMYLOIDOSIS: ICD-10-CM

## 2025-04-25 DIAGNOSIS — I50.20 HFREF (HEART FAILURE WITH REDUCED EJECTION FRACTION): ICD-10-CM

## 2025-04-25 RX ORDER — ATORVASTATIN CALCIUM 40 MG/1
40 TABLET, FILM COATED ORAL NIGHTLY
Qty: 90 TABLET | Refills: 1 | Status: SHIPPED | OUTPATIENT
Start: 2025-04-25

## 2025-04-25 NOTE — TELEPHONE ENCOUNTER
4/25/25  [id number]  Called to inquire if patient having any problems with atorvastatin 40 mg.    Patient denies any problems with taking atorvastatin at the 40 mg dose.

## 2025-04-26 LAB
25(OH)D3+25(OH)D2 SERPL-MCNC: 34 NG/ML (ref 30–100)
ALBUMIN SERPL-MCNC: 4.5 G/DL (ref 3.6–5.1)
ALP SERPL-CCNC: 80 U/L (ref 35–144)
ALT SERPL-CCNC: 14 U/L (ref 9–46)
ANION GAP SERPL CALCULATED.4IONS-SCNC: 7 MMOL/L (CALC) (ref 7–17)
AST SERPL-CCNC: 21 U/L (ref 10–35)
BASOPHILS # BLD AUTO: 39 CELLS/UL (ref 0–200)
BASOPHILS NFR BLD AUTO: 0.9 %
BILIRUB SERPL-MCNC: 1.8 MG/DL (ref 0.2–1.2)
BUN SERPL-MCNC: 17 MG/DL (ref 7–25)
CALCIUM SERPL-MCNC: 9.8 MG/DL (ref 8.6–10.3)
CHLORIDE SERPL-SCNC: 105 MMOL/L (ref 98–110)
CHOLEST SERPL-MCNC: 150 MG/DL
CHOLEST/HDLC SERPL: 3.8 (CALC)
CO2 SERPL-SCNC: 26 MMOL/L (ref 20–32)
CREAT SERPL-MCNC: 0.83 MG/DL (ref 0.7–1.28)
EGFRCR SERPLBLD CKD-EPI 2021: 91 ML/MIN/1.73M2
EOSINOPHIL # BLD AUTO: 159 CELLS/UL (ref 15–500)
EOSINOPHIL NFR BLD AUTO: 3.7 %
ERYTHROCYTE [DISTWIDTH] IN BLOOD BY AUTOMATED COUNT: 13.4 % (ref 11–15)
ERYTHROCYTE [DISTWIDTH] IN BLOOD BY AUTOMATED COUNT: 13.6 % (ref 11–15)
EST. AVERAGE GLUCOSE BLD GHB EST-MCNC: 146 MG/DL
EST. AVERAGE GLUCOSE BLD GHB EST-SCNC: 8.1 MMOL/L
GLUCOSE SERPL-MCNC: 96 MG/DL (ref 65–99)
HBA1C MFR BLD: 6.7 %
HCT VFR BLD AUTO: 48.2 % (ref 38.5–50)
HCT VFR BLD AUTO: 49.6 % (ref 38.5–50)
HDLC SERPL-MCNC: 39 MG/DL
HGB BLD-MCNC: 15.6 G/DL (ref 13.2–17.1)
HGB BLD-MCNC: 15.8 G/DL (ref 13.2–17.1)
LDLC SERPL CALC-MCNC: 87 MG/DL (CALC)
LYMPHOCYTES # BLD AUTO: 843 CELLS/UL (ref 850–3900)
LYMPHOCYTES NFR BLD AUTO: 19.6 %
MCH RBC QN AUTO: 29.9 PG (ref 27–33)
MCH RBC QN AUTO: 30.3 PG (ref 27–33)
MCHC RBC AUTO-ENTMCNC: 31.9 G/DL (ref 32–36)
MCHC RBC AUTO-ENTMCNC: 32.4 G/DL (ref 32–36)
MCV RBC AUTO: 92.5 FL (ref 80–100)
MCV RBC AUTO: 95 FL (ref 80–100)
MONOCYTES # BLD AUTO: 563 CELLS/UL (ref 200–950)
MONOCYTES NFR BLD AUTO: 13.1 %
NEUTROPHILS # BLD AUTO: 2696 CELLS/UL (ref 1500–7800)
NEUTROPHILS NFR BLD AUTO: 62.7 %
NONHDLC SERPL-MCNC: 111 MG/DL (CALC)
PLATELET # BLD AUTO: 230 THOUSAND/UL (ref 140–400)
PLATELET # BLD AUTO: 230 THOUSAND/UL (ref 140–400)
PMV BLD REES-ECKER: 10.6 FL (ref 7.5–12.5)
PMV BLD REES-ECKER: 10.8 FL (ref 7.5–12.5)
POTASSIUM SERPL-SCNC: 4.9 MMOL/L (ref 3.5–5.3)
PROT SERPL-MCNC: 6.7 G/DL (ref 6.1–8.1)
PSA SERPL-MCNC: 3.97 NG/ML
RBC # BLD AUTO: 5.21 MILLION/UL (ref 4.2–5.8)
RBC # BLD AUTO: 5.22 MILLION/UL (ref 4.2–5.8)
SODIUM SERPL-SCNC: 138 MMOL/L (ref 135–146)
TRIGL SERPL-MCNC: 137 MG/DL
VIT B12 SERPL-MCNC: 1185 PG/ML (ref 200–1100)
WBC # BLD AUTO: 4.3 THOUSAND/UL (ref 3.8–10.8)
WBC # BLD AUTO: 4.6 THOUSAND/UL (ref 3.8–10.8)

## 2025-04-27 LAB
ALBUMIN SERPL-MCNC: 4.6 G/DL (ref 3.6–5.1)
BNP SERPL-MCNC: 193 PG/ML
BUN SERPL-MCNC: 17 MG/DL (ref 7–25)
BUN/CREAT SERPL: NORMAL (CALC) (ref 6–22)
CALCIUM SERPL-MCNC: 9.8 MG/DL (ref 8.6–10.3)
CHLORIDE SERPL-SCNC: 106 MMOL/L (ref 98–110)
CO2 SERPL-SCNC: 25 MMOL/L (ref 20–32)
CREAT SERPL-MCNC: 0.87 MG/DL (ref 0.7–1.28)
EGFRCR SERPLBLD CKD-EPI 2021: 89 ML/MIN/1.73M2
GLUCOSE SERPL-MCNC: 99 MG/DL (ref 65–99)
PHOSPHATE SERPL-MCNC: 3.9 MG/DL (ref 2.1–4.3)
POTASSIUM SERPL-SCNC: 5 MMOL/L (ref 3.5–5.3)
SODIUM SERPL-SCNC: 140 MMOL/L (ref 135–146)

## 2025-04-30 ENCOUNTER — APPOINTMENT (OUTPATIENT)
Dept: PHARMACY | Facility: HOSPITAL | Age: 75
End: 2025-04-30
Payer: MEDICARE

## 2025-04-30 DIAGNOSIS — I50.20 HFREF (HEART FAILURE WITH REDUCED EJECTION FRACTION): ICD-10-CM

## 2025-04-30 NOTE — PROGRESS NOTES
Pharmacist Clinic: Cardiology Management    Treliborio Sumner is a 74 y.o. male was referred to Clinical Pharmacy Team for heart failure management.     Referring Provider: Karen Hammer MD*    THIS IS A FOLLOW UP PATIENT APPOINTMENT. AT LAST VISIT ON 01/22/2025 WITH PHARMACIST (Emelina Rowell).    Appointment was completed by Tre who was reached at primary number.    REVIEW OF LAST APPT:  Today was a follow up visit with Tre. Patient states he is tolerating his heart failure regimen well and reports adherence. Patient reports upset stomach has improved slightly, he takes both doses of Entresto with food and reports this helps. Patient reports no other adverse effects with his other medications and denies s/s of worsening heart failure. He continues to report SOB when he gets in a hurry and occasionally when he is sitting watching tv. Patient monitors his BP daily, he reports it averages ~120/60s, HR 60s. Discussed with patient although there is room to increase Entresto, plan to continue current regimen as prescribed for now. Although stomach upset is not common with Entresto, do not want to exacerbate GI symptoms.    Allergies Reviewed? No    Allergies   Allergen Reactions    Aspirin Unknown    Penicillins Other and Unknown    Statins-Hmg-Coa Reductase Inhibitors Unknown       Past Medical History:   Diagnosis Date    Abnormal electrocardiogram (ECG) (EKG)     Abnormal EKG    Encounter for screening for malignant neoplasm of prostate 02/16/2021    Encounter for prostate cancer screening    Other conditions influencing health status 01/05/2022    History of cough    Personal history of other diseases of the musculoskeletal system and connective tissue     History of osteoarthritis       Current Outpatient Medications on File Prior to Visit   Medication Sig Dispense Refill    atorvastatin (Lipitor) 40 mg tablet TAKE 1 TABLET BY MOUTH AT BEDTIME 90 tablet 1    carvedilol (Coreg) 6.25 mg tablet Take 1  "tablet (6.25 mg) by mouth 2 times a day after meals. 60 tablet 11    clopidogrel (Plavix) 75 mg tablet Take 1 tablet (75 mg) by mouth once daily. 90 tablet 3    cyanocobalamin, vitamin B-12, 1,000 mcg tablet, sublingual DISSOLVE 1 TABLET UNDER THE TONGUE ONCE A DAY      empagliflozin (Jardiance) 10 mg Take 1 tablet (10 mg) by mouth once daily. 90 tablet 3    fexofenadine (Allegra) 180 mg tablet Take 1 tablet (180 mg) by mouth once daily.      pedi multivit no.17 w-fluoride (Poly-Vi-Liane) 0.5 mg tablet,chewable Chew 1 tablet once daily.      sacubitriL-valsartan (Entresto) 49-51 mg tablet Take 1 tablet by mouth 2 times a day. 180 tablet 3    spironolactone (Aldactone) 25 mg tablet Take 1 tablet (25 mg) by mouth once daily. 30 tablet 11    tafamidis (Vyndamax) 61 mg capsule Take 1 capsule (61 mg) by mouth once daily. 30 capsule 10    [DISCONTINUED] atorvastatin (Lipitor) 40 mg tablet TAKE 1 TABLET BY MOUTH AT BEDTIME 90 tablet 3     No current facility-administered medications on file prior to visit.         RELEVANT LAB RESULTS  Lab Results   Component Value Date    BILITOT 1.8 (H) 04/25/2025    CALCIUM 9.8 04/25/2025    CO2 26 04/25/2025     04/25/2025    CREATININE 0.83 04/25/2025    GLUCOSE 96 04/25/2025    ALKPHOS 80 04/25/2025    K 4.9 04/25/2025    PROT 6.7 04/25/2025     04/25/2025    AST 21 04/25/2025    ALT 14 04/25/2025    BUN 17 04/25/2025    ANIONGAP 7 04/25/2025    MG 1.91 06/25/2024    PHOS 3.9 04/25/2025     10/17/2024    ALBUMIN 4.5 04/25/2025    GFRMALE 76 12/05/2022     Lab Results   Component Value Date    TRIG 137 04/25/2025    CHOL 150 04/25/2025    LDLCALC 87 04/25/2025    HDL 39 (L) 04/25/2025     No results found for: \"BMCBC\", \"CBCDIF\"     PHARMACEUTICAL ASSESSMENT    MEDICATION RECONCILIATION    Medication Documentation Review Audit       Reviewed by Mary Jane Nam MA (Medical Assistant) on 04/23/25 at 1311      Medication Order Taking? Sig Documenting Provider Last Dose " Status   atorvastatin (Lipitor) 40 mg tablet [id number] Yes TAKE 1 TABLET BY MOUTH AT BEDTIME Ruth Willoughby, APRN-CNP Taking Active   Discontinued 04/18/25 1401   carvedilol (Coreg) 6.25 mg tablet [id number] Yes Take 1 tablet (6.25 mg) by mouth 2 times a day after meals. Karen Hammer MD PhD  Active   clopidogrel (Plavix) 75 mg tablet [id number] Yes Take 1 tablet (75 mg) by mouth once daily. David Ward,  Taking Active   cyanocobalamin, vitamin B-12, 1,000 mcg tablet, sublingual [id number] Yes DISSOLVE 1 TABLET UNDER THE TONGUE ONCE A DAY Historical Provider, MD Taking Active   empagliflozin (Jardiance) 10 mg [id number] Yes Take 1 tablet (10 mg) by mouth once daily. Karen Hammer MD PhD  Active   fexofenadine (Allegra) 180 mg tablet [id number] Yes Take 1 tablet (180 mg) by mouth once daily. Historical Provider, MD Taking Active   pedi multivit no.17 w-fluoride (Poly-Vi-Liane) 0.5 mg tablet,chewable [id number] Yes Chew 1 tablet once daily. Historical Provider, MD Taking Active   sacubitriL-valsartan (Entresto) 49-51 mg tablet [id number] Yes Take 1 tablet by mouth 2 times a day. Karen Hammer MD PhD  Active   Discontinued 04/18/25 1401   spironolactone (Aldactone) 25 mg tablet [id number] Yes Take 1 tablet (25 mg) by mouth once daily. Karen Hammer MD PhD  Active   tafamidis (Vyndamax) 61 mg capsule [id number] Yes Take 1 capsule (61 mg) by mouth once daily. Karen Hammer MD PhD  Active                    DISEASE MANAGEMENT ASSESSMENT:     CHF ASSESSMENT     Symptom/Staging:  -Most recent ejection fraction: 40-45%  -Stage B, Class II    Results for orders placed during the hospital encounter of 07/11/24    Transthoracic Echo (TTE) [address] [address]  Phone [phone] Fax [phone]    TRANSTHORACIC ECHOCARDIOGRAM REPORT    Patient Name:      MAIKEL CONTE    [place] Physician:    [id number] Albaro Ovalles DO  Study Date:         7/11/2024            Ordering Provider:    [id number] TILA FAGAN  MRN/PID:           [id number]             Fellow:  Accession#:        [id number]         Nurse:  Date of Birth/Age: 1950 / 73 years Sonographer:          Shruti De Oliveira RDCS  Gender:            M                    Additional Staff:  Height:            167.64 cm            Admit Date:           7/11/2024  Weight:            70.31 kg             Admission Status:     Outpatient  BSA / BMI:         1.79 m2 / 25.02      Department Location:  [facility] Echo  kg/m2                                      Lab  Blood Pressure: 162 /84 mmHg    Study Type:    TRANSTHORACIC ECHO (TTE) COMPLETE  Diagnosis/ICD: Ventricular premature depolarization-I49.3; Atherosclerotic heart  disease of native coronary artery without angina pectoris-I25.10;  Unspecified systolic (congestive) heart failure (CHF)-I50.20;  Essential (primary) hypertension-I10  Indication:    Abnormal EKG, CAD, HTN  CPT Codes:     Echo Complete w Full Doppler-[id number]  Study Detail: The following Echo studies were performed: 2D, M-Mode, Doppler and  color flow. Patient's heart rhythm is with premature ventricular  contractions.      PHYSICIAN INTERPRETATION:  Left Ventricle: The left ventricular systolic function is mildly decreased, with a visually estimated ejection fraction of 40-45%. There is global hypokinesis of the left ventricle with minor regional variations. The left ventricular cavity size is mildly dilated. The left ventricular septal wall thickness is moderately increased. There is mildly increased left ventricular posterior wall thickness. Left Ventricular Global Longitudinal Strain - 8.4 %. Spectral Doppler shows an impaired relaxation pattern of left ventricular diastolic filling. Reduced LV strain at -8.4% with apical sparing pattern which can be seen in restrictive cardiomyopathies such as cardiac amyloidosis.  Left Atrium: The left atrium is normal in size.  Right Ventricle: The  right ventricle is normal in size. There is normal right ventricular global systolic function.  Right Atrium: The right atrium is normal in size.  Aortic Valve: The aortic valve is probably trileaflet. There is mild aortic valve thickening. The aortic valve dimensionless index is 0.93. There is mild aortic valve regurgitation. The peak instantaneous gradient of the aortic valve is 11.5 mmHg. The mean gradient of the aortic valve is 6.0 mmHg.  Mitral Valve: The mitral valve is mildly thickened. There is mild mitral annular calcification. There is mild to moderate mitral valve regurgitation.  Tricuspid Valve: The tricuspid valve is structurally normal. There is mild tricuspid regurgitation. The Doppler estimated RVSP is slightly elevated at 34.9 mmHg.  Pulmonic Valve: The pulmonic valve is structurally normal. There is physiologic pulmonic valve regurgitation.  Pericardium: There is no pericardial effusion noted.  Aorta: The aortic root is normal.      CONCLUSIONS:  1. The left ventricular systolic function is mildly decreased, with a visually estimated ejection fraction of 40-45%.  2. There is global hypokinesis of the left ventricle with minor regional variations.  3. Spectral Doppler shows an impaired relaxation pattern of left ventricular diastolic filling.  4. Left ventricular cavity size is mildly dilated.  5. Moderately increased left ventricular septal thickness.  6. Reduced LV strain at -8.4% with apical sparing pattern which can be seen in restrictive cardiomyopathies such as cardiac amyloidosis.  7. There is normal right ventricular global systolic function.  8. Mild to moderate mitral valve regurgitation.  9. Slightly elevated RVSP.  10. Mild aortic valve regurgitation.    QUANTITATIVE DATA SUMMARY:  2D MEASUREMENTS:  Normal Ranges:  IVSd:          1.61 cm    (0.6-1.1cm)  LVPWd:         1.16 cm    (0.6-1.1cm)  LVIDd:         5.72 cm    (3.9-5.9cm)  LVIDs:         4.74 cm  LV Mass Index: 197.8 g/m2  LV % FS         17.2 %    LA VOLUME:  Normal Ranges:  LA Vol A4C:        59.1 ml    (22+/-6mL/m2)  LA Vol A2C:        62.5 ml  LA Vol BP:         62.5 ml  LA Vol Index A4C:  32.9 ml/m2  LA Vol Index A2C:  34.9 ml/m2  LA Vol Index BP:   34.8 ml/m2  LA Area A4C:       19.2 cm2  LA Area A2C:       20.3 cm2  LA Major Axis A4C: 5.3 cm  LA Major Axis A2C: 5.6 cm  LA Volume Index:   32.2 ml/m2  LA Vol A4C:        55.4 ml  LA Vol A2C:        59.4 ml    RA VOLUME BY A/L METHOD:  Normal Ranges:  RA Area A4C: 17.0 cm2    M-MODE MEASUREMENTS:  Normal Ranges:  Ao Root: 3.40 cm (2.0-3.7cm)  AoV Exc: 2.00 cm (1.5-2.5cm)    AORTA MEASUREMENTS:  Normal Ranges:  AoV Exc:     2.00 cm (1.5-2.5cm)  Ao Sinus, d: 2.90 cm (2.1-3.5cm)  Ao STJ, d:   2.30 cm (1.7-3.4cm)  Asc Ao, d:   3.30 cm (2.1-3.4cm)    LV SYSTOLIC FUNCTION BY 2D PLANIMETRY (MOD):  Normal Ranges:  EF-A4C View:                      57 % (>=55%)  EF-A2C View:                      59 %  EF-Biplane:                       58 %  EF-Visual:                        43 %  LV EF Reported:                   43 %  Global Longitudinal Strain (GLS): 8 %    LV DIASTOLIC FUNCTION:  Normal Ranges:  MV Peak E:    0.60 m/s    (0.7-1.2 m/s)  MV Peak A:    1.05 m/s    (0.42-0.7 m/s)  E/A Ratio:    0.57        (1.0-2.2)  MV e'         0.056 m/s   (>8.0)  MV lateral e' 0.06 m/s  MV medial e'  0.05 m/s  MV A Dur:     171.27 msec  E/e' Ratio:   10.68       (<8.0)    MITRAL VALVE:  Normal Ranges:  MV DT: 259 msec (150-240msec)    AORTIC VALVE:  Normal Ranges:  AoV Vmax:                1.69 m/s  (<=1.7m/s)  AoV Peak P.5 mmHg (<20mmHg)  AoV Mean P.0 mmHg  (1.7-11.5mmHg)  LVOT Max Elder:            1.34 m/s  (<=1.1m/s)  AoV VTI:                 30.24 cm  (18-25cm)  LVOT VTI:                28.22 cm  LVOT Diameter:           2.04 cm   (1.8-2.4cm)  AoV Area, VTI:           3.06 cm2  (2.5-5.5cm2)  AoV Area,Vmax:           2.59 cm2  (2.5-4.5cm2)  AoV Dimensionless Index:  0.93    AORTIC INSUFFICIENCY:  AI Vmax:       3.76 m/s  AI Half-time:  819 msec  AI Decel Time: 2823 msec  AI Decel Rate: 133.03 cm/s2      RIGHT VENTRICLE:  RV Basal 3.59 cm  RV Mid   3.10 cm  RV Major 6.4 cm  TAPSE:   22.1 mm  RV s'    0.11 m/s    TRICUSPID VALVE/RVSP:  Normal Ranges:  Peak TR Velocity: 2.83 m/s  RV Syst Pressure: 34.9 mmHg (< 30mmHg)  TV e'             0.1 m/s    AORTA:  Asc Ao Diam 3.26 cm      [id number] Albaro Ovalles DO  Electronically signed on 7/11/2024 at 3:25:44 PM        ** Final **      Guideline-Directed Medical Therapy:  -ARNI: Yes, describe: Entresto 49/51mg twice daily  -Beta Blocker: Yes, describe: Carvedilol 6.25mg twice daily  -MRA: Yes, describe: Spironolactone 25mg daily  -SGLT2i: Yes, describe: Jardiance 10mg daily    Secondary Prevention:  -The 10-year ASCVD risk score (Erica MARQUEZ, et al., 2019) is: 52.4%    Values used to calculate the score:      Age: 74 years      Sex: Male      Is Non- : No      Diabetic: Yes      Tobacco smoker: No      Systolic Blood Pressure: 134 mmHg      Is BP treated: Yes      HDL Cholesterol: 39 mg/dL      Total Cholesterol: 150 mg/dL   -Aspirin 81mg? no; plavix 75mg daily  -Statin?: Yes, describe: Atorvastatin 40mg daily  -HTN?: Yes, describe: controlled at last OV    CURRENT PHARMACOTHERAPY:   Entresto 49/51mg twice daily  Carvedilol 6.25mg twice daily  Spironolactone 25mg daily  Jardiance 10mg daily  Vyndamax 61mg daily    Affordability:  PAP  Adherence/Compliance: reports adherence; patient uses pillbox  Adverse Effects: no concerns    Monitoring Weights at Home: Yes; couple times per week  Home Weight Recordings: 154lbs- denies weight fluctuation    Past In Office Weight Readings:   Wt Readings from Last 6 Encounters:   04/23/25 69.9 kg (154 lb)   04/18/25 70.2 kg (154 lb 12.8 oz)   04/08/25 71.5 kg (157 lb 8.3 oz)   04/07/25 71.7 kg (158 lb)   04/01/25 71.7 kg (158 lb)   02/26/25 71.7 kg (158 lb)       Monitoring Blood  Pressure at Home: Yes  Home BP Recordings: average 120s/60s, HR average 52    Past In Office BP Readings:   BP Readings from Last 6 Encounters:   04/23/25 134/66   04/18/25 138/65   04/08/25 145/79   04/07/25 120/72   04/01/25 144/61   02/26/25 148/76       HEALTH MANAGEMENT    Maintaining fluid restriction (<2 L/day): N/A  Edema/swelling: No  Shortness of breath: Yes when in a hurry; improving  Trouble sleeping/lying down: No  Dry/hacking cough: Yes- hoarseness in the mornings; no changes  Recent Hospitalizations: No    EDUCATION/COUNSELING:   - Counseled patient on MOA, expectations, duration of therapy, contraindications, administration, and monitoring parameters  - Counseled patient on lifestyle modifications that can decrease your risk of having complications (smoking cessation, losing weight, daily weights, vaccines)  - Counseled patient on fluid intake and weight management. Recommended to not consume more than 2 liters of fliuids per day. If they have gained more than 2-3 pounds within a 24 hours period (or 5 pounds in a week), contact their cardiologist  - Answered all patient questions and concerns       DISCUSSION/NOTES:   Today was a follow up visit with Tre. Patient states he is tolerating his heart failure regimen well reporting adherence, no adverse effects and denies s/s of worsening heart failure. He continues to report SOB when he gets in a hurry. Patient monitors his BP daily, he reports it averages ~120s/60s, HR average is 52. Patient's carvedilol was recently decreased to 6.25mg twice daily. Plan to continue current regimen and follow up in 1 month. If HR average remains low 50s, may consider decreasing carvedilol to 3.125mg twice daily or switching to metoprolol succinate.    ASSESSMENT AND PLAN:    Assessment/Plan   Problem List Items Addressed This Visit       HFrEF (heart failure with reduced ejection fraction)    Patient is currently on 4 of 4 GDMT medications. Reported home BP averages  120s/60s, HR average 52. Renal function and potassium level appropriate to continue current regimen.   Please note: carvedilol was recently decreased, plan to follow up in 1 month if HR remains low 50s, consider decreasing carvedilol to 3.125mg twice daily or switch to metoprolol succinate.     Labs (04/25/2025): K-4.9, Scr-0.83 eGFR-91         Relevant Orders    Referral to Clinical Pharmacy           RECOMMENDATIONS/PLAN    CONTINUE  Entresto 49/51mg twice daily  Carvedilol 6.25mg twice daily  Jardiance 10mg daily  Spironolactone 25mg daily  Vyndamax 61mg daily    Last Appnt with Referring Provider: 04/18/2025  Next Appnt with Referring Provider: 09/19/2025  Clinical Pharmacist follow up: 6 weeks  VAF/Application Expiration: 10/29/2025  Type of Encounter: Adamaris Rowell, PharmD    Verbal consent to manage patient's drug therapy was obtained from the patient . They were informed they may decline to participate or withdraw from participation in pharmacy services at any time.    Continue all meds under the continuation of care with the referring provider and clinical pharmacy team.

## 2025-04-30 NOTE — ASSESSMENT & PLAN NOTE
Patient is currently on 4 of 4 GDMT medications. Reported home BP averages 120s/60s, HR average 52. Renal function and potassium level appropriate to continue current regimen.   Please note: carvedilol was recently decreased, plan to follow up in 1 month if HR remains low 50s, consider decreasing carvedilol to 3.125mg twice daily or switch to metoprolol succinate.     Labs (04/25/2025): K-4.9, Scr-0.83 eGFR-91

## 2025-04-30 NOTE — Clinical Note
Radha Hammer,  Today was a follow up visit with Tre. Patient states he is tolerating his heart failure regimen well reporting adherence, no adverse effects and denies s/s of worsening heart failure. Patient monitors his BP daily, he reports it averages ~120s/60s, HR average is 52. Patient's confirms he is taking decreased dose of carvedilol, 6.25mg twice daily. Plan to continue current regimen and follow up in 1 month. If HR average remains low 50s, may consider decreasing carvedilol to 3.125mg twice daily or switching to metoprolol succinate. Thank you!

## 2025-05-02 ENCOUNTER — PHARMACY VISIT (OUTPATIENT)
Dept: PHARMACY | Facility: CLINIC | Age: 75
End: 2025-05-02
Payer: MEDICARE

## 2025-05-16 PROCEDURE — RXMED WILLOW AMBULATORY MEDICATION CHARGE

## 2025-05-18 DIAGNOSIS — I50.20 HFREF (HEART FAILURE WITH REDUCED EJECTION FRACTION): ICD-10-CM

## 2025-05-18 DIAGNOSIS — I43 CARDIAC AMYLOIDOSIS: ICD-10-CM

## 2025-05-18 DIAGNOSIS — E85.4 CARDIAC AMYLOIDOSIS: ICD-10-CM

## 2025-05-19 ENCOUNTER — PHARMACY VISIT (OUTPATIENT)
Dept: PHARMACY | Facility: CLINIC | Age: 75
End: 2025-05-19
Payer: MEDICARE

## 2025-05-19 LAB
ALBUMIN SERPL-MCNC: 4.4 G/DL (ref 3.6–5.1)
BNP SERPL-MCNC: 87 PG/ML
BUN SERPL-MCNC: 18 MG/DL (ref 7–25)
BUN/CREAT SERPL: NORMAL (CALC) (ref 6–22)
CALCIUM SERPL-MCNC: 9.7 MG/DL (ref 8.6–10.3)
CHLORIDE SERPL-SCNC: 105 MMOL/L (ref 98–110)
CO2 SERPL-SCNC: 24 MMOL/L (ref 20–32)
CREAT SERPL-MCNC: 0.85 MG/DL (ref 0.7–1.28)
EGFRCR SERPLBLD CKD-EPI 2021: 90 ML/MIN/1.73M2
ERYTHROCYTE [DISTWIDTH] IN BLOOD BY AUTOMATED COUNT: 14.1 % (ref 11–15)
GLUCOSE SERPL-MCNC: 116 MG/DL (ref 65–139)
HCT VFR BLD AUTO: 48.2 % (ref 38.5–50)
HGB BLD-MCNC: 15.2 G/DL (ref 13.2–17.1)
MCH RBC QN AUTO: 30.1 PG (ref 27–33)
MCHC RBC AUTO-ENTMCNC: 31.5 G/DL (ref 32–36)
MCV RBC AUTO: 95.4 FL (ref 80–100)
PHOSPHATE SERPL-MCNC: 3.6 MG/DL (ref 2.1–4.3)
PLATELET # BLD AUTO: 246 THOUSAND/UL (ref 140–400)
PMV BLD REES-ECKER: 10.9 FL (ref 7.5–12.5)
POTASSIUM SERPL-SCNC: 5.1 MMOL/L (ref 3.5–5.3)
RBC # BLD AUTO: 5.05 MILLION/UL (ref 4.2–5.8)
SODIUM SERPL-SCNC: 138 MMOL/L (ref 135–146)
WBC # BLD AUTO: 4.9 THOUSAND/UL (ref 3.8–10.8)

## 2025-05-23 ENCOUNTER — SPECIALTY PHARMACY (OUTPATIENT)
Dept: PHARMACY | Facility: CLINIC | Age: 75
End: 2025-05-23

## 2025-05-23 PROCEDURE — RXMED WILLOW AMBULATORY MEDICATION CHARGE

## 2025-06-02 ENCOUNTER — PHARMACY VISIT (OUTPATIENT)
Dept: PHARMACY | Facility: CLINIC | Age: 75
End: 2025-06-02
Payer: MEDICARE

## 2025-06-02 PROCEDURE — RXMED WILLOW AMBULATORY MEDICATION CHARGE

## 2025-06-04 ENCOUNTER — PHARMACY VISIT (OUTPATIENT)
Dept: PHARMACY | Facility: CLINIC | Age: 75
End: 2025-06-04
Payer: MEDICARE

## 2025-06-12 ENCOUNTER — APPOINTMENT (OUTPATIENT)
Dept: PHARMACY | Facility: HOSPITAL | Age: 75
End: 2025-06-12
Payer: MEDICARE

## 2025-06-12 DIAGNOSIS — I50.20 HFREF (HEART FAILURE WITH REDUCED EJECTION FRACTION): ICD-10-CM

## 2025-06-12 NOTE — Clinical Note
Radha Hammer,  Today I spoke with Tre about his heart medications. Patient states he is tolerating his heart failure regimen well reporting adherence and denies s/s of worsening heart failure. Patient does report feeling sluggish (possibly due to being under the weather) and occasional dizziness. Patient monitors his BP daily, he reports it averages ~100s/60s, HR averages high 50s-60s, patient reports HR has increased since carvedilol was decreased. Plan to continue current regimen and follow up in 3 months to renew enrollment in [facility]. Thank you!

## 2025-06-12 NOTE — ASSESSMENT & PLAN NOTE
Patient is currently on 4 of 4 GDMT medications. Reported home BP averages 100s/60s, HR averages high 50s-60s. Renal function and potassium level appropriate to continue current regimen.      Labs (05/16/2025): K-5.1 Scr 0.85 eGFR 90

## 2025-06-12 NOTE — PROGRESS NOTES
Pharmacist Clinic: Cardiology Management    Treliborio Sumner is a 74 y.o. male was referred to Clinical Pharmacy Team for heart failure management.     Referring Provider: Karen Hammer MD*    THIS IS A FOLLOW UP PATIENT APPOINTMENT. AT LAST VISIT ON 04/30/2025 WITH PHARMACIST (Emelina Rowell).    Appointment was completed by Tre who was reached at primary number.    REVIEW OF LAST APPT:  Today was a follow up visit with Tre. Patient states he is tolerating his heart failure regimen well reporting adherence, no adverse effects and denies s/s of worsening heart failure. He continues to report SOB when he gets in a hurry. Patient monitors his BP daily, he reports it averages ~120s/60s, HR average is 52. Patient's carvedilol was recently decreased to 6.25mg twice daily. Plan to continue current regimen and follow up in 1 month. If HR average remains low 50s, may consider decreasing carvedilol to 3.125mg twice daily or switching to metoprolol succinate.    Allergies Reviewed? No    Allergies   Allergen Reactions    Aspirin Unknown    Penicillins Other and Unknown    Statins-Hmg-Coa Reductase Inhibitors Unknown       Past Medical History:   Diagnosis Date    Abnormal electrocardiogram (ECG) (EKG)     Abnormal EKG    Encounter for screening for malignant neoplasm of prostate 02/16/2021    Encounter for prostate cancer screening    Other conditions influencing health status 01/05/2022    History of cough    Personal history of other diseases of the musculoskeletal system and connective tissue     History of osteoarthritis       Current Outpatient Medications on File Prior to Visit   Medication Sig Dispense Refill    atorvastatin (Lipitor) 40 mg tablet TAKE 1 TABLET BY MOUTH AT BEDTIME 90 tablet 1    carvedilol (Coreg) 6.25 mg tablet Take 1 tablet (6.25 mg) by mouth 2 times a day after meals. 60 tablet 11    clopidogrel (Plavix) 75 mg tablet Take 1 tablet (75 mg) by mouth once daily. 90 tablet 3     "cyanocobalamin, vitamin B-12, 1,000 mcg tablet, sublingual DISSOLVE 1 TABLET UNDER THE TONGUE ONCE A DAY      empagliflozin (Jardiance) 10 mg tablet Take 1 tablet (10 mg) by mouth once daily. 90 tablet 3    fexofenadine (Allegra) 180 mg tablet Take 1 tablet (180 mg) by mouth once daily.      pedi multivit no.17 w-fluoride (Poly-Vi-Liane) 0.5 mg tablet,chewable Chew 1 tablet once daily.      sacubitriL-valsartan (Entresto) 49-51 mg tablet Take 1 tablet by mouth 2 times a day. 180 tablet 3    spironolactone (Aldactone) 25 mg tablet Take 1 tablet (25 mg) by mouth once daily. 30 tablet 11    tafamidis (Vyndamax) 61 mg capsule Take 1 capsule (61 mg) by mouth once daily. 30 capsule 10     No current facility-administered medications on file prior to visit.         RELEVANT LAB RESULTS  Lab Results   Component Value Date    BILITOT 1.8 (H) 04/25/2025    CALCIUM 9.7 05/16/2025    CO2 24 05/16/2025     05/16/2025    CREATININE 0.85 05/16/2025    GLUCOSE 116 05/16/2025    ALKPHOS 80 04/25/2025    K 5.1 05/16/2025    PROT 6.7 04/25/2025     05/16/2025    AST 21 04/25/2025    ALT 14 04/25/2025    BUN 18 05/16/2025    ANIONGAP 7 04/25/2025    MG 1.91 06/25/2024    PHOS 3.6 05/16/2025     10/17/2024    ALBUMIN 4.4 05/16/2025    GFRMALE 76 12/05/2022     Lab Results   Component Value Date    TRIG 137 04/25/2025    CHOL 150 04/25/2025    LDLCALC 87 04/25/2025    HDL 39 (L) 04/25/2025     No results found for: \"BMCBC\", \"CBCDIF\"     PHARMACEUTICAL ASSESSMENT    MEDICATION RECONCILIATION    Medication Documentation Review Audit       Reviewed by Mary Jane Nam MA (Medical Assistant) on 04/23/25 at 1311      Medication Order Taking? Sig Documenting Provider Last Dose Status   atorvastatin (Lipitor) 40 mg tablet [id number] Yes TAKE 1 TABLET BY MOUTH AT BEDTIME Ruth Willoughby, APRN-CNP Taking Active   Discontinued 04/18/25 1401   carvedilol (Coreg) 6.25 mg tablet [id number] Yes Take 1 tablet (6.25 mg) by mouth 2 times " a day after meals. Karen Hammer MD PhD  Active   clopidogrel (Plavix) 75 mg tablet [id number] Yes Take 1 tablet (75 mg) by mouth once daily. David Ward,  Taking Active   cyanocobalamin, vitamin B-12, 1,000 mcg tablet, sublingual [id number] Yes DISSOLVE 1 TABLET UNDER THE TONGUE ONCE A DAY Historical Provider, MD Taking Active   empagliflozin (Jardiance) 10 mg [id number] Yes Take 1 tablet (10 mg) by mouth once daily. Karen Hammer MD PhD  Active   fexofenadine (Allegra) 180 mg tablet [id number] Yes Take 1 tablet (180 mg) by mouth once daily. Historical Provider, MD Taking Active   pedi multivit no.17 w-fluoride (Poly-Vi-Liane) 0.5 mg tablet,chewable [id number] Yes Chew 1 tablet once daily. Historical Provider, MD Taking Active   sacubitriL-valsartan (Entresto) 49-51 mg tablet [id number] Yes Take 1 tablet by mouth 2 times a day. Karen Hammer MD PhD  Active   Discontinued 04/18/25 1401   spironolactone (Aldactone) 25 mg tablet [id number] Yes Take 1 tablet (25 mg) by mouth once daily. Karen Hammer MD PhD  Active   tafamidis (Vyndamax) 61 mg capsule [id number] Yes Take 1 capsule (61 mg) by mouth once daily. Karen Hammer MD PhD  Active                    DISEASE MANAGEMENT ASSESSMENT:     CHF ASSESSMENT     Symptom/Staging:  -Most recent ejection fraction: 40-45%  -Stage B, Class II    Results for orders placed during the hospital encounter of 07/11/24    Transthoracic Echo (TTE) [place] [address]  Phone [phone] Fax [phone]    TRANSTHORACIC ECHOCARDIOGRAM REPORT    Patient Name:      MAIKEL Ignacio Physician:    [id number] Tila Fagan DO  Study Date:        7/11/2024            Ordering Provider:    [id number] TILA FAGAN  MRN/PID:           [id number]             Fellow:  Accession#:        [id number]         Nurse:  Date of Birth/Age: 1950 / 73 years Sonographer:          Shruti De Oliveira RD  Gender:             M                    Additional Staff:  Height:            167.64 cm            Admit Date:           7/11/2024  Weight:            70.31 kg             Admission Status:     Outpatient  BSA / BMI:         1.79 m2 / 25.02      Department Location:  [facility] Echo  kg/m2                                      Lab  Blood Pressure: 162 /84 mmHg    Study Type:    TRANSTHORACIC ECHO (TTE) COMPLETE  Diagnosis/ICD: Ventricular premature depolarization-I49.3; Atherosclerotic heart  disease of native coronary artery without angina pectoris-I25.10;  Unspecified systolic (congestive) heart failure (CHF)-I50.20;  Essential (primary) hypertension-I10  Indication:    Abnormal EKG, CAD, HTN  CPT Codes:     Echo Complete w Full Doppler-[id number]  Study Detail: The following Echo studies were performed: 2D, M-Mode, Doppler and  color flow. Patient's heart rhythm is with premature ventricular  contractions.      PHYSICIAN INTERPRETATION:  Left Ventricle: The left ventricular systolic function is mildly decreased, with a visually estimated ejection fraction of 40-45%. There is global hypokinesis of the left ventricle with minor regional variations. The left ventricular cavity size is mildly dilated. The left ventricular septal wall thickness is moderately increased. There is mildly increased left ventricular posterior wall thickness. Left Ventricular Global Longitudinal Strain - 8.4 %. Spectral Doppler shows an impaired relaxation pattern of left ventricular diastolic filling. Reduced LV strain at -8.4% with apical sparing pattern which can be seen in restrictive cardiomyopathies such as cardiac amyloidosis.  Left Atrium: The left atrium is normal in size.  Right Ventricle: The right ventricle is normal in size. There is normal right ventricular global systolic function.  Right Atrium: The right atrium is normal in size.  Aortic Valve: The aortic valve is probably trileaflet. There is mild aortic valve thickening. The aortic valve  dimensionless index is 0.93. There is mild aortic valve regurgitation. The peak instantaneous gradient of the aortic valve is 11.5 mmHg. The mean gradient of the aortic valve is 6.0 mmHg.  Mitral Valve: The mitral valve is mildly thickened. There is mild mitral annular calcification. There is mild to moderate mitral valve regurgitation.  Tricuspid Valve: The tricuspid valve is structurally normal. There is mild tricuspid regurgitation. The Doppler estimated RVSP is slightly elevated at 34.9 mmHg.  Pulmonic Valve: The pulmonic valve is structurally normal. There is physiologic pulmonic valve regurgitation.  Pericardium: There is no pericardial effusion noted.  Aorta: The aortic root is normal.      CONCLUSIONS:  1. The left ventricular systolic function is mildly decreased, with a visually estimated ejection fraction of 40-45%.  2. There is global hypokinesis of the left ventricle with minor regional variations.  3. Spectral Doppler shows an impaired relaxation pattern of left ventricular diastolic filling.  4. Left ventricular cavity size is mildly dilated.  5. Moderately increased left ventricular septal thickness.  6. Reduced LV strain at -8.4% with apical sparing pattern which can be seen in restrictive cardiomyopathies such as cardiac amyloidosis.  7. There is normal right ventricular global systolic function.  8. Mild to moderate mitral valve regurgitation.  9. Slightly elevated RVSP.  10. Mild aortic valve regurgitation.    QUANTITATIVE DATA SUMMARY:  2D MEASUREMENTS:  Normal Ranges:  IVSd:          1.61 cm    (0.6-1.1cm)  LVPWd:         1.16 cm    (0.6-1.1cm)  LVIDd:         5.72 cm    (3.9-5.9cm)  LVIDs:         4.74 cm  LV Mass Index: 197.8 g/m2  LV % FS        17.2 %    LA VOLUME:  Normal Ranges:  LA Vol A4C:        59.1 ml    (22+/-6mL/m2)  LA Vol A2C:        62.5 ml  LA Vol BP:         62.5 ml  LA Vol Index A4C:  32.9 ml/m2  LA Vol Index A2C:  34.9 ml/m2  LA Vol Index BP:   34.8 ml/m2  LA Area A4C:        19.2 cm2  LA Area A2C:       20.3 cm2  LA Major Axis A4C: 5.3 cm  LA Major Axis A2C: 5.6 cm  LA Volume Index:   32.2 ml/m2  LA Vol A4C:        55.4 ml  LA Vol A2C:        59.4 ml    RA VOLUME BY A/L METHOD:  Normal Ranges:  RA Area A4C: 17.0 cm2    M-MODE MEASUREMENTS:  Normal Ranges:  Ao Root: 3.40 cm (2.0-3.7cm)  AoV Exc: 2.00 cm (1.5-2.5cm)    AORTA MEASUREMENTS:  Normal Ranges:  AoV Exc:     2.00 cm (1.5-2.5cm)  Ao Sinus, d: 2.90 cm (2.1-3.5cm)  Ao STJ, d:   2.30 cm (1.7-3.4cm)  Asc Ao, d:   3.30 cm (2.1-3.4cm)    LV SYSTOLIC FUNCTION BY 2D PLANIMETRY (MOD):  Normal Ranges:  EF-A4C View:                      57 % (>=55%)  EF-A2C View:                      59 %  EF-Biplane:                       58 %  EF-Visual:                        43 %  LV EF Reported:                   43 %  Global Longitudinal Strain (GLS): 8 %    LV DIASTOLIC FUNCTION:  Normal Ranges:  MV Peak E:    0.60 m/s    (0.7-1.2 m/s)  MV Peak A:    1.05 m/s    (0.42-0.7 m/s)  E/A Ratio:    0.57        (1.0-2.2)  MV e'         0.056 m/s   (>8.0)  MV lateral e' 0.06 m/s  MV medial e'  0.05 m/s  MV A Dur:     171.27 msec  E/e' Ratio:   10.68       (<8.0)    MITRAL VALVE:  Normal Ranges:  MV DT: 259 msec (150-240msec)    AORTIC VALVE:  Normal Ranges:  AoV Vmax:                1.69 m/s  (<=1.7m/s)  AoV Peak P.5 mmHg (<20mmHg)  AoV Mean P.0 mmHg  (1.7-11.5mmHg)  LVOT Max Elder:            1.34 m/s  (<=1.1m/s)  AoV VTI:                 30.24 cm  (18-25cm)  LVOT VTI:                28.22 cm  LVOT Diameter:           2.04 cm   (1.8-2.4cm)  AoV Area, VTI:           3.06 cm2  (2.5-5.5cm2)  AoV Area,Vmax:           2.59 cm2  (2.5-4.5cm2)  AoV Dimensionless Index: 0.93    AORTIC INSUFFICIENCY:  AI Vmax:       3.76 m/s  AI Half-time:  819 msec  AI Decel Time: 2823 msec  AI Decel Rate: 133.03 cm/s2      RIGHT VENTRICLE:  RV Basal 3.59 cm  RV Mid   3.10 cm  RV Major 6.4 cm  TAPSE:   22.1 mm  RV s'    0.11 m/s    TRICUSPID  VALVE/RVSP:  Normal Ranges:  Peak TR Velocity: 2.83 m/s  RV Syst Pressure: 34.9 mmHg (< 30mmHg)  TV e'             0.1 m/s    AORTA:  Asc Ao Diam 3.26 cm      [id number] Albaro Ovalles DO  Electronically signed on 7/11/2024 at 3:25:44 PM        ** Final **      Guideline-Directed Medical Therapy:  -ARNI: Yes, describe: Entresto 49/51mg twice daily  -Beta Blocker: Yes, describe: Carvedilol 6.25mg twice daily  -MRA: Yes, describe: Spironolactone 25mg daily  -SGLT2i: Yes, describe: Jardiance 10mg daily    Secondary Prevention:  -The 10-year ASCVD risk score (Erica MARQUEZ, et al., 2019) is: 52.4%    Values used to calculate the score:      Age: 74 years      Sex: Male      Is Non- : No      Diabetic: Yes      Tobacco smoker: No      Systolic Blood Pressure: 134 mmHg      Is BP treated: Yes      HDL Cholesterol: 39 mg/dL      Total Cholesterol: 150 mg/dL   -Aspirin 81mg? no; plavix 75mg daily  -Statin?: Yes, describe: Atorvastatin 40mg daily  -HTN?: Yes, describe: controlled at last OV    CURRENT PHARMACOTHERAPY:   Entresto 49/51mg twice daily  Carvedilol 6.25mg twice daily  Spironolactone 25mg daily  Jardiance 10mg daily  Vyndamax 61mg daily    Affordability:  PAP  Adherence/Compliance: reports adherence; patient uses pillbox  Adverse Effects: feeling sluggish possibly due to being under the weather, occasional dizziness/lightheadedness in the mornings    Monitoring Weights at Home: Yes; couple times per week  Home Weight Recordings: 154lbs- denies weight fluctuation    Past In Office Weight Readings:   Wt Readings from Last 6 Encounters:   04/23/25 69.9 kg (154 lb)   04/18/25 70.2 kg (154 lb 12.8 oz)   04/08/25 71.5 kg (157 lb 8.3 oz)   04/07/25 71.7 kg (158 lb)   04/01/25 71.7 kg (158 lb)   02/26/25 71.7 kg (158 lb)       Monitoring Blood Pressure at Home: Yes  Home BP Recordings: 102/62, HR 63 109/64 HR 56, BP averages 100s/60s, HR high 50s-60s    Past In Office BP Readings:   BP Readings from Last 6  Encounters:   04/23/25 134/66   04/18/25 138/65   04/08/25 145/79   04/07/25 120/72   04/01/25 144/61   02/26/25 148/76       HEALTH MANAGEMENT    Maintaining fluid restriction (<2 L/day): N/A  Edema/swelling: No  Shortness of breath: Yes when in a hurry; improving  Trouble sleeping/lying down: No  Dry/hacking cough: Yes- hoarseness in the mornings; no changes  Recent Hospitalizations: No    EDUCATION/COUNSELING:   - Counseled patient on MOA, expectations, duration of therapy, contraindications, administration, and monitoring parameters  - Counseled patient on lifestyle modifications that can decrease your risk of having complications (smoking cessation, losing weight, daily weights, vaccines)  - Counseled patient on fluid intake and weight management. Recommended to not consume more than 2 liters of fliuids per day. If they have gained more than 2-3 pounds within a 24 hours period (or 5 pounds in a week), contact their cardiologist  - Answered all patient questions and concerns       DISCUSSION/NOTES:   Today was a follow up visit with Tre. Patient states he is tolerating his heart failure regimen well reporting adherence and denies s/s of worsening heart failure. He continues to report SOB when he gets in a hurry. Patient monitors his BP daily, he reports it averages ~100s/60s, HR averages high 50s-60s, patient reports HR has increased since carvedilol was decreased at last OV with referring provider. Plan to continue current regimen due to increase in HR.  Patient reports feeling sluggish, possibly due to being under the weather. Patient also reports occasional dizziness in the mornings, patient understands to move slowly when changing positions.   Patient aware at next follow up visit we will be discussing  PAP renewal.     ASSESSMENT AND PLAN:    Assessment/Plan   Problem List Items Addressed This Visit       HFrEF (heart failure with reduced ejection fraction)    Patient is currently on 4 of 4 GDMT  medications. Reported home BP averages 100s/60s, HR averages high 50s-60s. Renal function and potassium level appropriate to continue current regimen.      Labs (05/16/2025): K-5.1 Scr 0.85 eGFR 90         Relevant Orders    Referral to Clinical Pharmacy       RECOMMENDATIONS/PLAN    CONTINUE  Entresto 49/51mg twice daily  Carvedilol 6.25mg twice daily  Jardiance 10mg daily  Spironolactone 25mg daily  Vyndamax 61mg daily    Last Appnt with Referring Provider: 04/18/2025  Next Appnt with Referring Provider: 08/26/2025  Clinical Pharmacist follow up: 3 months  VAF/Application Expiration: 10/29/2025  Type of Encounter: Jeff CraftD    Verbal consent to manage patient's drug therapy was obtained from the patient . They were informed they may decline to participate or withdraw from participation in pharmacy services at any time.    Continue all meds under the continuation of care with the referring provider and clinical pharmacy team.

## 2025-06-18 DIAGNOSIS — I25.10 ATHEROSCLEROSIS OF NATIVE CORONARY ARTERY OF NATIVE HEART WITHOUT ANGINA PECTORIS: ICD-10-CM

## 2025-06-18 RX ORDER — CLOPIDOGREL BISULFATE 75 MG/1
75 TABLET ORAL DAILY
Qty: 90 TABLET | Refills: 3 | Status: SHIPPED | OUTPATIENT
Start: 2025-06-18

## 2025-06-27 ENCOUNTER — SPECIALTY PHARMACY (OUTPATIENT)
Dept: PHARMACY | Facility: CLINIC | Age: 75
End: 2025-06-27

## 2025-06-27 PROCEDURE — RXMED WILLOW AMBULATORY MEDICATION CHARGE

## 2025-07-02 ENCOUNTER — PHARMACY VISIT (OUTPATIENT)
Dept: PHARMACY | Facility: CLINIC | Age: 75
End: 2025-07-02
Payer: MEDICARE

## 2025-07-24 ENCOUNTER — SPECIALTY PHARMACY (OUTPATIENT)
Dept: PHARMACY | Facility: CLINIC | Age: 75
End: 2025-07-24

## 2025-07-24 PROCEDURE — RXMED WILLOW AMBULATORY MEDICATION CHARGE

## 2025-07-30 ENCOUNTER — PHARMACY VISIT (OUTPATIENT)
Dept: PHARMACY | Facility: CLINIC | Age: 75
End: 2025-07-30
Payer: MEDICARE

## 2025-08-10 PROCEDURE — RXMED WILLOW AMBULATORY MEDICATION CHARGE

## 2025-08-14 ENCOUNTER — PHARMACY VISIT (OUTPATIENT)
Dept: PHARMACY | Facility: CLINIC | Age: 75
End: 2025-08-14
Payer: MEDICARE

## 2025-08-21 ENCOUNTER — SPECIALTY PHARMACY (OUTPATIENT)
Dept: PHARMACY | Facility: CLINIC | Age: 75
End: 2025-08-21

## 2025-08-26 ENCOUNTER — APPOINTMENT (OUTPATIENT)
Facility: CLINIC | Age: 75
End: 2025-08-26
Payer: MEDICARE

## 2025-08-26 ENCOUNTER — ANCILLARY PROCEDURE (OUTPATIENT)
Facility: CLINIC | Age: 75
End: 2025-08-26
Payer: MEDICARE

## 2025-08-26 VITALS
DIASTOLIC BLOOD PRESSURE: 54 MMHG | HEART RATE: 66 BPM | SYSTOLIC BLOOD PRESSURE: 136 MMHG | HEIGHT: 65 IN | OXYGEN SATURATION: 98 % | BODY MASS INDEX: 24.99 KG/M2 | WEIGHT: 150 LBS | RESPIRATION RATE: 16 BRPM

## 2025-08-26 DIAGNOSIS — I43 CARDIAC AMYLOIDOSIS: ICD-10-CM

## 2025-08-26 DIAGNOSIS — I50.20 HFREF (HEART FAILURE WITH REDUCED EJECTION FRACTION): ICD-10-CM

## 2025-08-26 DIAGNOSIS — E85.4 CARDIAC AMYLOIDOSIS: ICD-10-CM

## 2025-08-26 PROCEDURE — 3075F SYST BP GE 130 - 139MM HG: CPT | Performed by: STUDENT IN AN ORGANIZED HEALTH CARE EDUCATION/TRAINING PROGRAM

## 2025-08-26 PROCEDURE — 93005 ELECTROCARDIOGRAM TRACING: CPT

## 2025-08-26 PROCEDURE — RXMED WILLOW AMBULATORY MEDICATION CHARGE

## 2025-08-26 PROCEDURE — 99212 OFFICE O/P EST SF 10 MIN: CPT

## 2025-08-26 PROCEDURE — 1159F MED LIST DOCD IN RCRD: CPT | Performed by: STUDENT IN AN ORGANIZED HEALTH CARE EDUCATION/TRAINING PROGRAM

## 2025-08-26 PROCEDURE — 3078F DIAST BP <80 MM HG: CPT | Performed by: STUDENT IN AN ORGANIZED HEALTH CARE EDUCATION/TRAINING PROGRAM

## 2025-08-26 PROCEDURE — 99215 OFFICE O/P EST HI 40 MIN: CPT | Performed by: STUDENT IN AN ORGANIZED HEALTH CARE EDUCATION/TRAINING PROGRAM

## 2025-08-26 RX ORDER — SACUBITRIL AND VALSARTAN 24; 26 MG/1; MG/1
1 TABLET ORAL 2 TIMES DAILY
Qty: 180 TABLET | Refills: 3 | Status: SHIPPED | OUTPATIENT
Start: 2025-08-26 | End: 2026-08-26

## 2025-08-26 RX ORDER — SPIRONOLACTONE 25 MG/1
25 TABLET ORAL DAILY
Qty: 30 TABLET | Refills: 11 | Status: SHIPPED | OUTPATIENT
Start: 2025-08-26 | End: 2026-08-26

## 2025-08-26 ASSESSMENT — COLUMBIA-SUICIDE SEVERITY RATING SCALE - C-SSRS
6. HAVE YOU EVER DONE ANYTHING, STARTED TO DO ANYTHING, OR PREPARED TO DO ANYTHING TO END YOUR LIFE?: NO
2. HAVE YOU ACTUALLY HAD ANY THOUGHTS OF KILLING YOURSELF?: NO
1. IN THE PAST MONTH, HAVE YOU WISHED YOU WERE DEAD OR WISHED YOU COULD GO TO SLEEP AND NOT WAKE UP?: NO

## 2025-08-26 ASSESSMENT — ENCOUNTER SYMPTOMS: DEPRESSION: 0

## 2025-08-27 LAB
ALBUMIN SERPL-MCNC: 4.3 G/DL (ref 3.6–5.1)
ATRIAL RATE: 69 BPM
BNP SERPL-MCNC: 120 PG/ML
BUN SERPL-MCNC: 19 MG/DL (ref 7–25)
BUN/CREAT SERPL: NORMAL (CALC) (ref 6–22)
CALCIUM SERPL-MCNC: 9.9 MG/DL (ref 8.6–10.3)
CHLORIDE SERPL-SCNC: 106 MMOL/L (ref 98–110)
CO2 SERPL-SCNC: 25 MMOL/L (ref 20–32)
CREAT SERPL-MCNC: 0.93 MG/DL (ref 0.7–1.28)
EGFRCR SERPLBLD CKD-EPI 2021: 85 ML/MIN/1.73M2
ERYTHROCYTE [DISTWIDTH] IN BLOOD BY AUTOMATED COUNT: 13.4 % (ref 11–15)
FERRITIN SERPL-MCNC: 211 NG/ML (ref 24–380)
GLUCOSE SERPL-MCNC: 94 MG/DL (ref 65–139)
HCT VFR BLD AUTO: 45.8 % (ref 38.5–50)
HGB BLD-MCNC: 14.8 G/DL (ref 13.2–17.1)
IRON SATN MFR SERPL: 33 % (CALC) (ref 20–48)
IRON SERPL-MCNC: 94 MCG/DL (ref 50–180)
MCH RBC QN AUTO: 30.5 PG (ref 27–33)
MCHC RBC AUTO-ENTMCNC: 32.3 G/DL (ref 32–36)
MCV RBC AUTO: 94.2 FL (ref 80–100)
P AXIS: 58 DEGREES
P OFFSET: 180 MS
P ONSET: 119 MS
PHOSPHATE SERPL-MCNC: 4.2 MG/DL (ref 2.1–4.3)
PLATELET # BLD AUTO: 243 THOUSAND/UL (ref 140–400)
PMV BLD REES-ECKER: 11 FL (ref 7.5–12.5)
POTASSIUM SERPL-SCNC: 4.8 MMOL/L (ref 3.5–5.3)
PR INTERVAL: 178 MS
Q ONSET: 208 MS
QRS COUNT: 11 BEATS
QRS DURATION: 144 MS
QT INTERVAL: 444 MS
QTC CALCULATION(BAZETT): 475 MS
QTC FREDERICIA: 465 MS
R AXIS: -37 DEGREES
RBC # BLD AUTO: 4.86 MILLION/UL (ref 4.2–5.8)
SODIUM SERPL-SCNC: 139 MMOL/L (ref 135–146)
T AXIS: 134 DEGREES
T OFFSET: 430 MS
TIBC SERPL-MCNC: 281 MCG/DL (CALC) (ref 250–425)
VENTRICULAR RATE: 69 BPM
WBC # BLD AUTO: 4.9 THOUSAND/UL (ref 3.8–10.8)

## 2025-08-27 PROCEDURE — RXMED WILLOW AMBULATORY MEDICATION CHARGE

## 2025-08-28 ENCOUNTER — PHARMACY VISIT (OUTPATIENT)
Dept: PHARMACY | Facility: CLINIC | Age: 75
End: 2025-08-28
Payer: MEDICARE

## 2025-08-29 ENCOUNTER — PHARMACY VISIT (OUTPATIENT)
Dept: PHARMACY | Facility: CLINIC | Age: 75
End: 2025-08-29
Payer: MEDICARE

## 2025-09-02 ENCOUNTER — HOSPITAL ENCOUNTER (OUTPATIENT)
Dept: CARDIOLOGY | Facility: HOSPITAL | Age: 75
Discharge: HOME | End: 2025-09-02
Payer: MEDICARE

## 2025-09-02 DIAGNOSIS — E85.4 CARDIAC AMYLOIDOSIS: ICD-10-CM

## 2025-09-02 DIAGNOSIS — I43 CARDIAC AMYLOIDOSIS: ICD-10-CM

## 2025-09-02 DIAGNOSIS — I50.20 HFREF (HEART FAILURE WITH REDUCED EJECTION FRACTION): ICD-10-CM

## 2025-09-02 PROCEDURE — 93306 TTE W/DOPPLER COMPLETE: CPT | Performed by: INTERNAL MEDICINE

## 2025-09-02 PROCEDURE — 93306 TTE W/DOPPLER COMPLETE: CPT

## 2025-09-03 LAB
AORTIC VALVE MEAN GRADIENT: 10 MMHG
AORTIC VALVE PEAK VELOCITY: 2.03 M/S
AV PEAK GRADIENT: 16 MMHG
AVA (PEAK VEL): 1.92 CM2
AVA (VTI): 2.01 CM2
EJECTION FRACTION APICAL 4 CHAMBER: 44.5
EJECTION FRACTION: 50 %
LEFT ATRIUM VOLUME AREA LENGTH INDEX BSA: 33.4 ML/M2
LEFT VENTRICLE INTERNAL DIMENSION DIASTOLE: 3.87 CM (ref 3.5–6)
LEFT VENTRICULAR OUTFLOW TRACT DIAMETER: 2.17 CM
LV EJECTION FRACTION BIPLANE: 50 %
MITRAL VALVE E/A RATIO: 0.63
MITRAL VALVE E/E' RATIO: 12.93
RIGHT VENTRICLE FREE WALL PEAK S': 12.92 CM/S
RIGHT VENTRICLE PEAK SYSTOLIC PRESSURE: 34 MMHG
TRICUSPID ANNULAR PLANE SYSTOLIC EXCURSION: 2.5 CM

## 2025-09-19 ENCOUNTER — APPOINTMENT (OUTPATIENT)
Dept: CARDIOLOGY | Facility: HOSPITAL | Age: 75
End: 2025-09-19
Payer: MEDICARE

## 2025-10-02 ENCOUNTER — APPOINTMENT (OUTPATIENT)
Dept: PHARMACY | Facility: HOSPITAL | Age: 75
End: 2025-10-02
Payer: MEDICARE

## 2025-10-07 ENCOUNTER — APPOINTMENT (OUTPATIENT)
Dept: CARDIOLOGY | Facility: CLINIC | Age: 75
End: 2025-10-07
Payer: MEDICARE

## 2025-10-27 ENCOUNTER — APPOINTMENT (OUTPATIENT)
Dept: PRIMARY CARE | Facility: CLINIC | Age: 75
End: 2025-10-27
Payer: MEDICARE

## 2025-11-04 ENCOUNTER — APPOINTMENT (OUTPATIENT)
Facility: CLINIC | Age: 75
End: 2025-11-04
Payer: MEDICARE

## (undated) DEVICE — INTRODUCER SHEATH, GLIDESHEATH, 6FR 10CM

## (undated) DEVICE — MANIFOLD KIT, CUSTOM, GEAUGA

## (undated) DEVICE — ANGIO KIT, LEFT HEART, LF, CUSTOM

## (undated) DEVICE — TR BAND, RADIAL COMPRESSION, STANDARD, 24CM

## (undated) DEVICE — Device

## (undated) DEVICE — CATHETER, GUIDING, LAUNCHER, 6FR EBU 3.5

## (undated) DEVICE — GUIDEWIRE, PRESSURE WIRE X , 175CM WIRELESS, AGILE TIP

## (undated) DEVICE — GUIDEWIRE, INQUIRE, J TIP, .035 X 210CM, FIXED CORE, DIAGNOSTIC

## (undated) DEVICE — NEEDLE, ENTRY, PERCUTANEOUS, 21 G X 2.5 CM

## (undated) DEVICE — CATHETER, OPTITORQUE, 5FR, TIG1, 1H/100CM